# Patient Record
Sex: MALE | Race: WHITE | NOT HISPANIC OR LATINO | Employment: UNEMPLOYED
[De-identification: names, ages, dates, MRNs, and addresses within clinical notes are randomized per-mention and may not be internally consistent; named-entity substitution may affect disease eponyms.]

---

## 2017-06-29 ENCOUNTER — RECORDS - HEALTHEAST (OUTPATIENT)
Dept: ADMINISTRATIVE | Facility: OTHER | Age: 47
End: 2017-06-29

## 2017-06-29 ENCOUNTER — HOSPITAL ENCOUNTER (OUTPATIENT)
Dept: ULTRASOUND IMAGING | Facility: HOSPITAL | Age: 47
Discharge: HOME | End: 2017-06-29

## 2017-06-29 DIAGNOSIS — M79.89 LEG SWELLING: ICD-10-CM

## 2018-08-10 ENCOUNTER — RECORDS - HEALTHEAST (OUTPATIENT)
Dept: LAB | Facility: CLINIC | Age: 48
End: 2018-08-10

## 2018-08-10 LAB
C REACTIVE PROTEIN LHE: 2.9 MG/DL (ref 0–0.8)
ERYTHROCYTE [SEDIMENTATION RATE] IN BLOOD BY WESTERGREN METHOD: 3 MM/HR (ref 0–15)
RHEUMATOID FACT SERPL-ACNC: <15 IU/ML (ref 0–30)
URATE SERPL-MCNC: 7.4 MG/DL (ref 3–8)

## 2018-08-13 LAB — B BURGDOR IGG+IGM SER QL: 0.18 INDEX VALUE

## 2018-08-23 ENCOUNTER — RECORDS - HEALTHEAST (OUTPATIENT)
Dept: LAB | Facility: CLINIC | Age: 48
End: 2018-08-23

## 2018-08-23 LAB — C REACTIVE PROTEIN LHE: 0.6 MG/DL (ref 0–0.8)

## 2019-08-16 ENCOUNTER — RECORDS - HEALTHEAST (OUTPATIENT)
Dept: LAB | Facility: CLINIC | Age: 49
End: 2019-08-16

## 2019-08-16 LAB
ANION GAP SERPL CALCULATED.3IONS-SCNC: 11 MMOL/L (ref 5–18)
BUN SERPL-MCNC: 11 MG/DL (ref 8–22)
CALCIUM SERPL-MCNC: 9.7 MG/DL (ref 8.5–10.5)
CHLORIDE BLD-SCNC: 103 MMOL/L (ref 98–107)
CHOLEST SERPL-MCNC: 205 MG/DL
CO2 SERPL-SCNC: 24 MMOL/L (ref 22–31)
CREAT SERPL-MCNC: 0.92 MG/DL (ref 0.7–1.3)
FASTING STATUS PATIENT QL REPORTED: ABNORMAL
GFR SERPL CREATININE-BSD FRML MDRD: >60 ML/MIN/1.73M2
GLUCOSE BLD-MCNC: 92 MG/DL (ref 70–125)
HDLC SERPL-MCNC: 38 MG/DL
LDLC SERPL CALC-MCNC: 141 MG/DL
POTASSIUM BLD-SCNC: 3.9 MMOL/L (ref 3.5–5)
SODIUM SERPL-SCNC: 138 MMOL/L (ref 136–145)
TRIGL SERPL-MCNC: 132 MG/DL
TSH SERPL DL<=0.005 MIU/L-ACNC: 2.04 UIU/ML (ref 0.3–5)

## 2019-08-19 LAB — B BURGDOR IGG+IGM SER QL: 0.16 INDEX VALUE

## 2019-08-20 ENCOUNTER — RECORDS - HEALTHEAST (OUTPATIENT)
Dept: ADMINISTRATIVE | Facility: OTHER | Age: 49
End: 2019-08-20

## 2019-08-20 ENCOUNTER — AMBULATORY - HEALTHEAST (OUTPATIENT)
Dept: CARDIOLOGY | Facility: CLINIC | Age: 49
End: 2019-08-20

## 2019-08-23 ENCOUNTER — OFFICE VISIT - HEALTHEAST (OUTPATIENT)
Dept: CARDIOLOGY | Facility: CLINIC | Age: 49
End: 2019-08-23

## 2019-08-23 DIAGNOSIS — R07.2 PRECORDIAL PAIN: ICD-10-CM

## 2019-08-23 ASSESSMENT — MIFFLIN-ST. JEOR: SCORE: 1832.89

## 2019-08-28 ENCOUNTER — HOSPITAL ENCOUNTER (OUTPATIENT)
Dept: CARDIOLOGY | Facility: HOSPITAL | Age: 49
Discharge: HOME | End: 2019-08-28
Attending: INTERNAL MEDICINE

## 2019-08-28 DIAGNOSIS — R07.2 PRECORDIAL PAIN: ICD-10-CM

## 2019-08-28 LAB
CV STRESS CURRENT BP HE: NORMAL
CV STRESS CURRENT HR HE: 101
CV STRESS CURRENT HR HE: 103
CV STRESS CURRENT HR HE: 104
CV STRESS CURRENT HR HE: 104
CV STRESS CURRENT HR HE: 108
CV STRESS CURRENT HR HE: 117
CV STRESS CURRENT HR HE: 117
CV STRESS CURRENT HR HE: 120
CV STRESS CURRENT HR HE: 125
CV STRESS CURRENT HR HE: 133
CV STRESS CURRENT HR HE: 134
CV STRESS CURRENT HR HE: 146
CV STRESS CURRENT HR HE: 146
CV STRESS CURRENT HR HE: 155
CV STRESS CURRENT HR HE: 155
CV STRESS CURRENT HR HE: 62
CV STRESS CURRENT HR HE: 63
CV STRESS CURRENT HR HE: 84
CV STRESS CURRENT HR HE: 86
CV STRESS CURRENT HR HE: 86
CV STRESS CURRENT HR HE: 87
CV STRESS CURRENT HR HE: 88
CV STRESS CURRENT HR HE: 90
CV STRESS CURRENT HR HE: 90
CV STRESS CURRENT HR HE: 91
CV STRESS CURRENT HR HE: 91
CV STRESS CURRENT HR HE: 93
CV STRESS CURRENT HR HE: 94
CV STRESS CURRENT HR HE: 96
CV STRESS CURRENT HR HE: 98
CV STRESS CURRENT HR HE: 99
CV STRESS DEVIATION TIME HE: NORMAL
CV STRESS ECHO PERCENT HR HE: NORMAL
CV STRESS EXERCISE STAGE HE: NORMAL
CV STRESS FINAL RESTING BP HE: NORMAL
CV STRESS FINAL RESTING HR HE: 91
CV STRESS MAX HR HE: 155
CV STRESS MAX TREADMILL GRADE HE: 16
CV STRESS MAX TREADMILL SPEED HE: 4.2
CV STRESS PEAK DIA BP HE: NORMAL
CV STRESS PEAK SYS BP HE: NORMAL
CV STRESS PHASE HE: NORMAL
CV STRESS PROTOCOL HE: NORMAL
CV STRESS RESTING PT POSITION HE: NORMAL
CV STRESS RESTING PT POSITION HE: NORMAL
CV STRESS ST DEVIATION AMOUNT HE: NORMAL
CV STRESS ST DEVIATION ELEVATION HE: NORMAL
CV STRESS ST EVELATION AMOUNT HE: NORMAL
CV STRESS TEST TYPE HE: NORMAL
CV STRESS TOTAL STAGE TIME MIN 1 HE: NORMAL
ECHO EJECTION FRACTION ESTIMATED: 65 %
STRESS ECHO BASELINE BP: NORMAL
STRESS ECHO BASELINE HR: 62
STRESS ECHO CALCULATED PERCENT HR: 90 %
STRESS ECHO LAST STRESS BP: NORMAL
STRESS ECHO LAST STRESS HR: 155
STRESS ECHO POST ESTIMATED WORKLOAD: 11.7
STRESS ECHO POST EXERCISE DUR MIN: 10
STRESS ECHO POST EXERCISE DUR SEC: 8
STRESS ECHO TARGET HR: 147

## 2019-08-29 ENCOUNTER — AMBULATORY - HEALTHEAST (OUTPATIENT)
Dept: CARDIOLOGY | Facility: CLINIC | Age: 49
End: 2019-08-29

## 2019-08-29 DIAGNOSIS — R07.2 PRECORDIAL PAIN: ICD-10-CM

## 2019-08-30 ENCOUNTER — AMBULATORY - HEALTHEAST (OUTPATIENT)
Dept: CARDIOLOGY | Facility: CLINIC | Age: 49
End: 2019-08-30

## 2019-09-11 ENCOUNTER — HOSPITAL ENCOUNTER (OUTPATIENT)
Dept: CT IMAGING | Facility: CLINIC | Age: 49
Discharge: HOME | End: 2019-09-11
Attending: INTERNAL MEDICINE

## 2019-09-11 DIAGNOSIS — R07.2 PRECORDIAL PAIN: ICD-10-CM

## 2019-09-11 LAB
CV CALCIUM SCORE AGATSTON LM: 0
CV CALCIUM SCORING AGATSON LAD: 0
CV CALCIUM SCORING AGATSTON CX: 0
CV CALCIUM SCORING AGATSTON RCA: 0
CV CALCIUM SCORING AGATSTON TOTAL: 0

## 2019-09-12 ENCOUNTER — COMMUNICATION - HEALTHEAST (OUTPATIENT)
Dept: CARDIOLOGY | Facility: CLINIC | Age: 49
End: 2019-09-12

## 2019-09-13 ENCOUNTER — COMMUNICATION - HEALTHEAST (OUTPATIENT)
Dept: CARDIOLOGY | Facility: CLINIC | Age: 49
End: 2019-09-13

## 2019-12-30 ENCOUNTER — RECORDS - HEALTHEAST (OUTPATIENT)
Dept: ADMINISTRATIVE | Facility: OTHER | Age: 49
End: 2019-12-30

## 2020-01-02 ENCOUNTER — HOSPITAL ENCOUNTER (OUTPATIENT)
Dept: RADIOLOGY | Facility: HOSPITAL | Age: 50
Discharge: HOME | End: 2020-01-02

## 2020-01-02 DIAGNOSIS — K21.00 REFLUX ESOPHAGITIS: ICD-10-CM

## 2020-07-10 ENCOUNTER — RECORDS - HEALTHEAST (OUTPATIENT)
Dept: LAB | Facility: CLINIC | Age: 50
End: 2020-07-10

## 2020-07-10 LAB
ALBUMIN SERPL-MCNC: 4.3 G/DL (ref 3.5–5)
ALP SERPL-CCNC: 96 U/L (ref 45–120)
ALT SERPL W P-5'-P-CCNC: 67 U/L (ref 0–45)
AST SERPL W P-5'-P-CCNC: 43 U/L (ref 0–40)
BASOPHILS # BLD AUTO: 0.1 THOU/UL (ref 0–0.2)
BASOPHILS NFR BLD AUTO: 1 % (ref 0–2)
BILIRUB DIRECT SERPL-MCNC: 0.2 MG/DL
BILIRUB SERPL-MCNC: 0.7 MG/DL (ref 0–1)
C REACTIVE PROTEIN LHE: 0.2 MG/DL (ref 0–0.8)
EOSINOPHIL # BLD AUTO: 0.2 THOU/UL (ref 0–0.4)
EOSINOPHIL NFR BLD AUTO: 3 % (ref 0–6)
ERYTHROCYTE [DISTWIDTH] IN BLOOD BY AUTOMATED COUNT: 13.9 % (ref 11–14.5)
ERYTHROCYTE [SEDIMENTATION RATE] IN BLOOD BY WESTERGREN METHOD: 2 MM/HR (ref 0–15)
HCT VFR BLD AUTO: 45.9 % (ref 40–54)
HGB BLD-MCNC: 14.8 G/DL (ref 14–18)
LYMPHOCYTES # BLD AUTO: 2.5 THOU/UL (ref 0.8–4.4)
LYMPHOCYTES NFR BLD AUTO: 33 % (ref 20–40)
MCH RBC QN AUTO: 27.3 PG (ref 27–34)
MCHC RBC AUTO-ENTMCNC: 32.2 G/DL (ref 32–36)
MCV RBC AUTO: 85 FL (ref 80–100)
MONOCYTES # BLD AUTO: 0.5 THOU/UL (ref 0–0.9)
MONOCYTES NFR BLD AUTO: 7 % (ref 2–10)
NEUTROPHILS # BLD AUTO: 4.1 THOU/UL (ref 2–7.7)
NEUTROPHILS NFR BLD AUTO: 56 % (ref 50–70)
PLATELET # BLD AUTO: 219 THOU/UL (ref 140–440)
PMV BLD AUTO: 11 FL (ref 8.5–12.5)
PROT SERPL-MCNC: 7.1 G/DL (ref 6–8)
RBC # BLD AUTO: 5.42 MILL/UL (ref 4.4–6.2)
WBC: 7.4 THOU/UL (ref 4–11)

## 2020-07-24 ENCOUNTER — RECORDS - HEALTHEAST (OUTPATIENT)
Dept: LAB | Facility: CLINIC | Age: 50
End: 2020-07-24

## 2020-07-24 LAB
ALBUMIN SERPL-MCNC: 4.1 G/DL (ref 3.5–5)
ALP SERPL-CCNC: 96 U/L (ref 45–120)
ALT SERPL W P-5'-P-CCNC: 50 U/L (ref 0–45)
AST SERPL W P-5'-P-CCNC: 35 U/L (ref 0–40)
BILIRUB DIRECT SERPL-MCNC: 0.2 MG/DL
BILIRUB SERPL-MCNC: 0.6 MG/DL (ref 0–1)
PROT SERPL-MCNC: 6.7 G/DL (ref 6–8)

## 2020-09-25 ENCOUNTER — RECORDS - HEALTHEAST (OUTPATIENT)
Dept: LAB | Facility: CLINIC | Age: 50
End: 2020-09-25

## 2020-09-25 LAB
ALBUMIN SERPL-MCNC: 4.5 G/DL (ref 3.5–5)
ALP SERPL-CCNC: 106 U/L (ref 45–120)
ALT SERPL W P-5'-P-CCNC: 84 U/L (ref 0–45)
ANION GAP SERPL CALCULATED.3IONS-SCNC: 9 MMOL/L (ref 5–18)
AST SERPL W P-5'-P-CCNC: 58 U/L (ref 0–40)
BILIRUB SERPL-MCNC: 0.8 MG/DL (ref 0–1)
BUN SERPL-MCNC: 8 MG/DL (ref 8–22)
CALCIUM SERPL-MCNC: 9.5 MG/DL (ref 8.5–10.5)
CHLORIDE BLD-SCNC: 105 MMOL/L (ref 98–107)
CO2 SERPL-SCNC: 27 MMOL/L (ref 22–31)
CREAT SERPL-MCNC: 0.82 MG/DL (ref 0.7–1.3)
GFR SERPL CREATININE-BSD FRML MDRD: >60 ML/MIN/1.73M2
GLUCOSE BLD-MCNC: 102 MG/DL (ref 70–125)
POTASSIUM BLD-SCNC: 4.1 MMOL/L (ref 3.5–5)
PROT SERPL-MCNC: 7.3 G/DL (ref 6–8)
SODIUM SERPL-SCNC: 141 MMOL/L (ref 136–145)

## 2020-09-26 LAB — BACTERIA SPEC CULT: NO GROWTH

## 2021-05-31 NOTE — PATIENT INSTRUCTIONS - HE
Rigoberto Laird,    It was a pleasure to see you today at the [facility].     My recommendations after this visit include:    Stress echo  Try omeparazol for chante Dickinson MD, FACC, REINALDO

## 2021-06-01 NOTE — TELEPHONE ENCOUNTER
----- Message from Shelly Martinez sent at 9/13/2019 10:42 AM CDT -----  Contact: Pt  General phone call:    Caller: Pt  Primary cardiologist: Teena  Detailed reason for call: Pt called for CT results. Please call back.  Best phone number:   Best time to contact: Any  Ok to leave a detailed message? Yes  Device? No    Additional Info:

## 2021-06-01 NOTE — TELEPHONE ENCOUNTER
Returned call to Rigoberto, shared results and recommendations. Pt continues to FU with PCP. Advised him to call if he has further questions or concerns going forward. -kcl

## 2021-06-03 VITALS — HEIGHT: 69 IN | BODY MASS INDEX: 31.76 KG/M2 | WEIGHT: 214.4 LBS

## 2021-06-10 ENCOUNTER — RECORDS - HEALTHEAST (OUTPATIENT)
Dept: LAB | Facility: CLINIC | Age: 51
End: 2021-06-10

## 2021-06-11 LAB
VARICELLA ZOSTER DNA COMMENT: ABNORMAL
VZV DNA SPEC QL NAA+PROBE: ABNORMAL
VZV PCR SPECIMEN: ABNORMAL

## 2021-06-16 PROBLEM — J18.9 COMMUNITY ACQUIRED PNEUMONIA, BILATERAL: Status: ACTIVE | Noted: 2020-06-18

## 2021-06-16 PROBLEM — R07.2 PRECORDIAL PAIN: Status: ACTIVE | Noted: 2019-08-23

## 2021-06-19 NOTE — LETTER
Letter by Mari Kahn RN at      Author: Mari Kahn RN Service: -- Author Type: --    Filed:  Encounter Date: 9/12/2019 Status: (Other)         Rigoberto Laird  [address] [place] [address]             September 12, 2019         Dear Mr. Larid,    Below are the results from your recent visit:    Resulted Orders   CT Cardiac Calcium Score   Result Value Ref Range    Agatston Score of Left Main 0     Agatston Score of the Left Anterior Descending 0     Agatston Score of Circumflex 0     Agatston Score of Right Coronary Artery 0     Total Score 0.00     Narrative      The total Agatston calcium score is 0. A calcium score of zero places   the individual in the lowest quartile when compared to an age and gender   matched control group and implies a low risk of cardiac events in the next   10 years. (less than 1% per year).        From: Lc Dickinson MD (Ted)  Sent: 9/11/2019   5:22 PM  No coronary calcium, excellent prognosis, does not require cholesterol-lowering medication at this juncture of his life.  Follow-up with primary physician    Please call with questionst.    Sincerely,        Electronically signed by Mari JACQUES RN

## 2021-06-25 ENCOUNTER — TRANSFERRED RECORDS (OUTPATIENT)
Dept: HEALTH INFORMATION MANAGEMENT | Facility: CLINIC | Age: 51
End: 2021-06-25

## 2021-06-25 LAB
ALT SERPL-CCNC: 84 IU/L (ref 0–44)
AST SERPL-CCNC: 54 IU/L (ref 0–40)
CREATININE (EXTERNAL): 0.77 MG/DL (ref 0.76–1.27)
GFR ESTIMATED (EXTERNAL): 107 ML/MIN/1.73
GFR ESTIMATED (IF AFRICAN AMERICAN) (EXTERNAL): 123 ML/MIN/1.73

## 2021-06-27 NOTE — PROGRESS NOTES
Progress Notes by Lc Dickinson MD (Ted) at 8/23/2019 10:20 AM     Author: Lc Dickinson MD (Ted) Service: -- Author Type: Physician    Filed: 8/23/2019 10:46 AM Encounter Date: 8/23/2019 Status: Signed    : Lc Dickinson MD (Ted) (Physician)           Click to link to [facility] NOTE    Thank you, Dr. Varela, for asking the [facility] to evaluate Mr. Rigoberto Laird.      Assessment/Recommendations   Assessment:    Chest discomfort, uncertain etiology, atypical features  GERD  Tobacco use  Hypercholesterolemia, mild, untreated    Plan:  Stress echo  If negative we may consider coronary calcium scan to determine need for cholesterol treatment.    We discussed healthy lifestyle choices including regular exercise tobacco cessation and proper diet    Chest pain could be related to worsening heartburn and possible esophageal spasm.  He should try over-the-counter omeprazole.       History of Present Illness    Mr. Rigoberto Laird is a 49 y.o. male who comes into rapid access clinic for follow-up from the emergency room.  On 8/19/2019 he developed anterior chest discomfort and chest tightness.  The discomfort came in after he completed a walk.  There was no pleuritic features of the chest discomfort.  The chest discomfort was somewhat better when he was leaning forward.  He had no fever chills or sputum production.  He denies any tenderness of the chest wall.  He came to the emergency room.  ER evaluation was negative including normal EKG,troponins at the bedside chest ultrasound.  He states that he has had similar but less intense chest feeling on and off for last several days.  He denies any exertional features of the chest discomfort.  He is not known to have coronary artery disease.  He has never had ischemic evaluation.  He reports that he has had heartburn on and off for several years.  In recent weeks heartburn become more  bothersome.  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  He did not require any intervention.  In recent weeks he had 2 short episode of heart palpitations.  The palpitation occurred at rest and lasted less than 5 minutes.  He denies syncope or near syncope.    ECG: Personally reviewed.  Normal sinus rhythm within normal limits    Chest x-ray: Negative     Physical Examination Review of Systems   Vitals:    08/23/19 1008   BP: 110/80   Pulse: 64   Resp: 16     Body mass index is 31.66 kg/m .  Wt Readings from Last 3 Encounters:   08/23/19 214 lb 6.4 oz (97.3 kg)   08/19/19 215 lb (97.5 kg)     General Appearance:   Alert, cooperative, no distress, appears stated age   Head/ENT: Normocephalic, without obvious abnormality. Membranes moist      EYES:  no scleral icterus, normal conjunctivae   Neck: Supple, symmetrical, trachea midline, no adenopathy, thyroid: not enlarged, symmetric, no carotid bruit or JVD   Chest/Lungs:   Lungs are clear to auscultation, respirations unlabored. No tenderness or deformity    Cardiovascular:   Regular rhythm, S1, S2 normal, no murmur, rub or gallop.   Abdomen:  Soft, non-tender, bowel sounds active all four quadrants,  no masses, no organomegaly   Extremities: no cyanosis or clubbing. No edema   Skin: Skin color, texture, turgor normal, no rashes or lesions.    Psychiatric: Normal affect, calm   Neurologic: Alert and oriented x 3, moving all four extremities.     General: WNL  Eyes: WNL  Ears/Nose/Throat: WNL  Lungs: WNL  Heart: Chest Pain  Stomach: Constipation, Heartburn  Bladder: WNL  Muscle/Joints: WNL  Skin: WNL  Nervous System: WNL  Mental Health: WNL     Blood: WNL     Medical History  Surgical History Family History Social History   GERD None  his brother had stents in his 50s.  Brother is diabetic. Social History     Socioeconomic History   ? Marital status:      Spouse name: Not on file   ? Number of children: Not on file   ? Years of education: Not on file    ? Highest education level: Not on file   Occupational History   ? Not on file   Social Needs   ? Financial resource strain: Not on file   ? Food insecurity:     Worry: Not on file     Inability: Not on file   ? Transportation needs:     Medical: Not on file     Non-medical: Not on file   Tobacco Use   ? Smoking status: Current Some Day Smoker     Types: Cigarettes     Start date: 2004   ? Smokeless tobacco: Never Used   Substance and Sexual Activity   ? Alcohol use: Not on file   ? Drug use: Not on file   ? Sexual activity: Not on file   Lifestyle   ? Physical activity:     Days per week: Not on file     Minutes per session: Not on file   ? Stress: Not on file   Relationships   ? Social connections:     Talks on phone: Not on file     Gets together: Not on file     Attends Hinduism service: Not on file     Active member of club or organization: Not on file     Attends meetings of clubs or organizations: Not on file     Relationship status: Not on file   ? Intimate partner violence:     Fear of current or ex partner: Not on file     Emotionally abused: Not on file     Physically abused: Not on file     Forced sexual activity: Not on file   Other Topics Concern   ? Not on file   Social History Narrative   ? Not on file          Medications  Allergies   No current outpatient medications on file.     No current facility-administered medications for this visit.       No Known Allergies      Lab Results    Chemistry/lipid CBC Cardiac Enzymes/BNP/TSH/INR   Lab Results   Component Value Date    CHOL 205 (H) 08/16/2019    HDL 38 (L) 08/16/2019    LDLCALC 141 (H) 08/16/2019    TRIG 132 08/16/2019    CREATININE 1.00 08/19/2019    BUN 12 08/19/2019    K 3.7 08/19/2019     08/19/2019     08/19/2019    CO2 27 08/19/2019    Lab Results   Component Value Date    WBC 6.4 08/19/2019    HGB 15.3 08/19/2019    HCT 45.6 08/19/2019    MCV 86 08/19/2019     08/19/2019    Lab Results   Component Value Date     TROPONINI <0.01 08/19/2019    TSH 2.04 08/16/2019

## 2021-07-01 ENCOUNTER — TRANSFERRED RECORDS (OUTPATIENT)
Dept: HEALTH INFORMATION MANAGEMENT | Facility: CLINIC | Age: 51
End: 2021-07-01

## 2021-07-03 NOTE — ADDENDUM NOTE
Addendum Note by Herminio Kahn RN at 8/29/2019 10:25 AM     Author: Herminio Kahn RN Service: -- Author Type: Registered Nurse    Filed: 8/30/2019  3:38 PM Encounter Date: 8/29/2019 Status: Signed    : Herminio Kahn RN (Registered Nurse)    Addended by: HERMINIO KAHN on: 8/30/2019 03:38 PM        Modules accepted: Orders

## 2021-07-06 ENCOUNTER — RECORDS - HEALTHEAST (OUTPATIENT)
Dept: LAB | Facility: CLINIC | Age: 51
End: 2021-07-06

## 2021-07-07 ENCOUNTER — TRANSFERRED RECORDS (OUTPATIENT)
Dept: HEALTH INFORMATION MANAGEMENT | Facility: CLINIC | Age: 51
End: 2021-07-07

## 2021-07-07 LAB — O+P STL MICRO: NORMAL

## 2021-07-13 ENCOUNTER — LAB REQUISITION (OUTPATIENT)
Dept: LAB | Facility: CLINIC | Age: 51
End: 2021-07-13

## 2021-07-13 DIAGNOSIS — R19.7 DIARRHEA, UNSPECIFIED: ICD-10-CM

## 2021-07-13 PROCEDURE — 87493 C DIFF AMPLIFIED PROBE: CPT | Performed by: PHYSICIAN ASSISTANT

## 2021-07-14 LAB — C DIFF TOX B STL QL: NEGATIVE

## 2021-08-22 ENCOUNTER — HEALTH MAINTENANCE LETTER (OUTPATIENT)
Age: 51
End: 2021-08-22

## 2021-08-23 ENCOUNTER — TELEPHONE (OUTPATIENT)
Dept: RHEUMATOLOGY | Facility: CLINIC | Age: 51
End: 2021-08-23

## 2021-08-23 ENCOUNTER — VIRTUAL VISIT (OUTPATIENT)
Dept: RHEUMATOLOGY | Facility: CLINIC | Age: 51
End: 2021-08-23
Attending: PHYSICIAN ASSISTANT
Payer: COMMERCIAL

## 2021-08-23 DIAGNOSIS — M06.4 INFLAMMATORY POLYARTHRITIS (H): ICD-10-CM

## 2021-08-23 DIAGNOSIS — M25.50 POLYARTHRALGIA: Primary | ICD-10-CM

## 2021-08-23 DIAGNOSIS — M25.50 MULTIPLE JOINT PAIN: ICD-10-CM

## 2021-08-23 PROCEDURE — 99204 OFFICE O/P NEW MOD 45 MIN: CPT | Mod: 95 | Performed by: INTERNAL MEDICINE

## 2021-08-23 RX ORDER — LEFLUNOMIDE 20 MG/1
1 TABLET ORAL DAILY
COMMUNITY
End: 2021-09-23

## 2021-08-23 RX ORDER — OMEGA-3/DHA/EPA/FISH OIL 60 MG-90MG
1 CAPSULE ORAL DAILY
COMMUNITY

## 2021-08-23 NOTE — TELEPHONE ENCOUNTER
Left voicemail for patient to call back.     Pt need to do lab and follow up with Dr. Butt (in-person) after result come back.     Please schedule appointment. Thanks.

## 2021-08-23 NOTE — PROGRESS NOTES
Rigoberto is a 51 year old who is being evaluated via a billable video visit.      How would you like to obtain your AVS? [employer]  If the video visit is dropped, the invitation should be resent by: [phone]  Will anyone else be joining your video visit? No               Video-Visit Details    Type of service:  Video Visit  Start: 08/23/2021 04:46 pm   Video End Time:5:05 PM    Originating Location (pt. Location): Home    Distant Location (provider location):  [facility]     Platform used for Video Visit: Both [employer]     This document was created using a software with less than 100% fidelity, at times resulting in unintended, even erroneous syntax and grammar.  The reader is advised to keep this under consideration while reviewing, interpreting this note.    ASSESSMENT AND PLAN:  Rigoberto Laird 51 year old male is seen here on 08/23/21 for evaluation of is a several features consistent with inflammatory joint disease has had a slew of infection since on Humira he would like to stop it permanently, he is already not taken it for the past 4 weeks.  We discussed various nuances associated with management of inflammatory joint disease.  Check labs as noted.  He will stay off Humira for now.  We will meet here in the next few weeks.  Leflunomide 20 as now on alternate days.  Diagnoses and all orders for this visit:  Polyarthralgia  -     Hepatitis C antibody; Future  -     Anti Nuclear Susanne IgG by IFA with Reflex; Future  -     Rheumatoid factor; Future  -     Cyclic Citrullinated Peptide Antibody IgG; Future  -     CRP inflammation; Future  -     Creatinine; Future  -     CBC with Platelets & Differential; Future  -     ALT; Future  -     Albumin level; Future  -     Uric acid; Future  Multiple joint pain  -     Rheumatology Referral  Inflammatory polyarthritis (H)  -     Hepatitis C antibody; Future  -     Anti Nuclear Susanne IgG by IFA with Reflex; Future  -     Rheumatoid factor;  "Future  -     Cyclic Citrullinated Peptide Antibody IgG; Future  -     CRP inflammation; Future  -     Creatinine; Future  -     CBC with Platelets & Differential; Future  -     ALT; Future  -     Albumin level; Future  -     Uric acid; Future    HISTORY OF PRESENTING ILLNESS:  Rigoberto Laird, 51 year old, male is here for evaluation of polyarthralgias, diagnosed as inflammatory joint disease.  He is accompanied by his wife.  He reports that his symptoms began nearly 10 years ago.  This was in his feet.  He would be told that this is plantar fasciitis.  He will be given prednisone off and on when he would \"flareup\".  He would be given a boot.  5 years ago he started hurting even more so.  His wife is concerned that this represents more than plantar fasciitis.  Gradually other joints including knees, wrists and shoulders also started hurting.  2 years ago he started noticing pain and some swelling in the wrists.  About 18 months or so ago he \"blew up\" everywhere with swollen knees swollen ankles and wrists.  He was finally seen in rheumatology he was given intravenous infusions of steroids he recalls for several days and finally his pain came under control.  He has since tried methotrexate, he does not recall all the details but wonders if it was for 2 or 3 weeks, and he was then told that he should go on leflunomide which was associated with abnormal liver function studies and now he takes it every other day, he was started on Humira a year ago.  With this commendation he had an excellent control of his joint symptoms.  However since being on Humira he has had multiple episodes of various infectious conditions.  He has had multiple bladder infections shingles pleurisy and a gastric infection, apparently rotavirus.  He feels that he rather not take Humira indeed he has not taken it for the past 1 month.  There has been no flareup of his joint symptoms.  There is no personal family history of psoriasis ulcerative " colitis or Crohn's disease.  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         ALLERGIES:Patient has no known allergies.    PAST MEDICAL/ACTIVE PROBLEMS/MEDICATION/ FAMILY HISTORY/SOCIAL DATA:  The patient has a family history of  No past medical history on file.  History   Smoking Status     Current Some Day Smoker     Types: Cigarettes, Cigarettes     Start date: 1/1/2004   Smokeless Tobacco     Never Used     Patient Active Problem List   Diagnosis     Precordial pain     Community acquired pneumonia, bilateral     Current Outpatient Medications   Medication Sig Dispense Refill     Cholecalciferol (VITAMIN D3 PO) Take 100 mcg by mouth daily       MEDICATION CANNOT BE REORDERED - PLEASE MANUALLY REORDER AND DISCONTINUE THE OLD ORDER Inject 40 mg Subcutaneous every 14 days Humira       omeprazole (PRILOSEC) 40 MG capsule [OMEPRAZOLE (PRILOSEC) 40 MG CAPSULE] Take 40 mg by mouth 2 (two) times a day before meals.       traZODone (DESYREL) 50 MG tablet [TRAZODONE (DESYREL) 50 MG TABLET] Take 100 mg by mouth at bedtime as needed for sleep.       UNABLE TO FIND MEDICATION NAME: turmuric- take 1 tab every day       docusate sodium (COLACE) 100 MG capsule [DOCUSATE SODIUM (COLACE) 100 MG CAPSULE] Take 1 capsule (100 mg total) by mouth daily as needed for constipation. (Patient not taking: Reported on 8/23/2021)  0     ergocalciferol (VITAMIN D2) 1,250 mcg (50,000 unit) capsule [ERGOCALCIFEROL (VITAMIN D2) 1,250 MCG (50,000 UNIT) CAPSULE] Take 50,000 Units by mouth every 7 days. (Patient not taking: Reported on 8/23/2021)       fish oil-omega-3 fatty acids 500 MG capsule Take 1 capsule by mouth daily       folic acid (FOLVITE) 1 MG tablet [FOLIC ACID (FOLVITE) 1 MG TABLET] Take 1 mg by mouth daily. (Patient not taking: Reported on 8/23/2021)       leflunomide (ARAVA) 20 MG tablet Take 1 tablet by mouth daily       Multiple Vitamin (MULTI VITAMIN) TABS Take 1 tablet by mouth daily          COMPREHENSIVE EXAMINATION:  There were no vitals filed for this visit.  A well appearing alert oriented male. Well appearing alert oriented.   Examination of the eyes revealed no redness, obvious scleromalacia.  ENT examination shows no nasal deformity such as of saddle type, external ear without signs of inflamm/deformity ation.  Cardiopulmonary examination without obvious signs of dyspnea, no wheezing is audible, no cyanosis.  There is no finger clubbing.  Skin examination performed for heliotrope, malar area eruption periungual erythema, lupus pernio.  Neurological examination shows the speech is fluent, no facial asymmetry, muscle power in the upper extremities proximally appears to be normal.  In the psychiatric examination the memory, orientation, attention, affect were observable and normal.  Joint examination of the DIPs, PIPs, MCPs, IP joints of the thumbs, wrists, elbows, for swelling, range of motion, for shoulders range of motion evaluated.  No swelling of the digits he can flex his fingers into a fist bilaterally wrist elbow shoulder range of motion is full.    LAB / IMAGING DATA:  ALT   Date Value Ref Range Status   11/02/2020 69 (H) 0 - 45 U/L Final   09/25/2020 84 (H) 0 - 45 U/L Final   07/24/2020 50 (H) 0 - 45 U/L Final     Albumin   Date Value Ref Range Status   11/02/2020 4.3 3.5 - 5.0 g/dL Final   09/25/2020 4.5 3.5 - 5.0 g/dL Final   07/24/2020 4.1 3.5 - 5.0 g/dL Final       WBC   Date Value Ref Range Status   11/02/2020 7.2 4.0 - 11.0 thou/uL Final   07/10/2020 7.4 4.0 - 11.0 thou/uL Final     Hemoglobin   Date Value Ref Range Status   11/02/2020 15.3 14.0 - 18.0 g/dL Final   07/10/2020 14.8 14.0 - 18.0 g/dL Final   06/19/2020 13.4 (L) 14.0 - 18.0 g/dL Final     Platelet Count   Date Value Ref Range Status   11/02/2020 237 140 - 440 thou/uL Final   07/10/2020 219 140 - 440 thou/uL Final   06/19/2020 229 140 - 440 thou/uL Final       No results found for: SANTOS

## 2021-09-09 ENCOUNTER — LAB (OUTPATIENT)
Dept: LAB | Facility: CLINIC | Age: 51
End: 2021-09-09
Payer: COMMERCIAL

## 2021-09-09 DIAGNOSIS — M25.50 POLYARTHRALGIA: ICD-10-CM

## 2021-09-09 DIAGNOSIS — M06.4 INFLAMMATORY POLYARTHRITIS (H): ICD-10-CM

## 2021-09-09 LAB
ALBUMIN SERPL-MCNC: 3.9 G/DL (ref 3.5–5)
ALT SERPL W P-5'-P-CCNC: 44 U/L (ref 0–45)
BASOPHILS # BLD AUTO: 0.1 10E3/UL (ref 0–0.2)
BASOPHILS NFR BLD AUTO: 1 %
C REACTIVE PROTEIN LHE: 0.1 MG/DL (ref 0–0.8)
CCP AB SER IA-ACNC: <0.5 U/ML
CREAT SERPL-MCNC: 0.79 MG/DL (ref 0.7–1.3)
EOSINOPHIL # BLD AUTO: 0.3 10E3/UL (ref 0–0.7)
EOSINOPHIL NFR BLD AUTO: 4 %
ERYTHROCYTE [DISTWIDTH] IN BLOOD BY AUTOMATED COUNT: 13.5 % (ref 10–15)
GFR SERPL CREATININE-BSD FRML MDRD: >90 ML/MIN/1.73M2
HCT VFR BLD AUTO: 47 % (ref 40–53)
HGB BLD-MCNC: 15.2 G/DL (ref 13.3–17.7)
IMM GRANULOCYTES # BLD: 0 10E3/UL
IMM GRANULOCYTES NFR BLD: 0 %
LYMPHOCYTES # BLD AUTO: 2 10E3/UL (ref 0.8–5.3)
LYMPHOCYTES NFR BLD AUTO: 29 %
MCH RBC QN AUTO: 28 PG (ref 26.5–33)
MCHC RBC AUTO-ENTMCNC: 32.3 G/DL (ref 31.5–36.5)
MCV RBC AUTO: 87 FL (ref 78–100)
MONOCYTES # BLD AUTO: 0.7 10E3/UL (ref 0–1.3)
MONOCYTES NFR BLD AUTO: 9 %
NEUTROPHILS # BLD AUTO: 4.1 10E3/UL (ref 1.6–8.3)
NEUTROPHILS NFR BLD AUTO: 58 %
PLATELET # BLD AUTO: 219 10E3/UL (ref 150–450)
RBC # BLD AUTO: 5.43 10E6/UL (ref 4.4–5.9)
RHEUMATOID FACT SER NEPH-ACNC: <15 IU/ML (ref 0–30)
URATE SERPL-MCNC: 6.5 MG/DL (ref 3–8)
WBC # BLD AUTO: 7.1 10E3/UL (ref 4–11)

## 2021-09-09 PROCEDURE — 86803 HEPATITIS C AB TEST: CPT

## 2021-09-09 PROCEDURE — 84550 ASSAY OF BLOOD/URIC ACID: CPT

## 2021-09-09 PROCEDURE — 86141 C-REACTIVE PROTEIN HS: CPT

## 2021-09-09 PROCEDURE — 86200 CCP ANTIBODY: CPT

## 2021-09-09 PROCEDURE — 86038 ANTINUCLEAR ANTIBODIES: CPT

## 2021-09-09 PROCEDURE — 85025 COMPLETE CBC W/AUTO DIFF WBC: CPT

## 2021-09-09 PROCEDURE — 82565 ASSAY OF CREATININE: CPT

## 2021-09-09 PROCEDURE — 84460 ALANINE AMINO (ALT) (SGPT): CPT

## 2021-09-09 PROCEDURE — 36415 COLL VENOUS BLD VENIPUNCTURE: CPT

## 2021-09-09 PROCEDURE — 86431 RHEUMATOID FACTOR QUANT: CPT

## 2021-09-09 PROCEDURE — 82040 ASSAY OF SERUM ALBUMIN: CPT

## 2021-09-10 LAB — HCV AB SERPL QL IA: NEGATIVE

## 2021-09-14 LAB — ANA SER QL IF: NEGATIVE

## 2021-09-23 ENCOUNTER — OFFICE VISIT (OUTPATIENT)
Dept: RHEUMATOLOGY | Facility: CLINIC | Age: 51
End: 2021-09-23
Payer: COMMERCIAL

## 2021-09-23 VITALS
WEIGHT: 200.4 LBS | BODY MASS INDEX: 29.59 KG/M2 | SYSTOLIC BLOOD PRESSURE: 120 MMHG | DIASTOLIC BLOOD PRESSURE: 70 MMHG | HEART RATE: 64 BPM

## 2021-09-23 DIAGNOSIS — M25.50 POLYARTHRALGIA: ICD-10-CM

## 2021-09-23 DIAGNOSIS — Z79.899 HIGH RISK MEDICATION USE: ICD-10-CM

## 2021-09-23 DIAGNOSIS — M06.4 INFLAMMATORY POLYARTHRITIS (H): Primary | ICD-10-CM

## 2021-09-23 PROCEDURE — 99214 OFFICE O/P EST MOD 30 MIN: CPT | Performed by: INTERNAL MEDICINE

## 2021-09-23 RX ORDER — LEFLUNOMIDE 20 MG/1
20 TABLET ORAL EVERY OTHER DAY
Qty: 45 TABLET | Refills: 0 | Status: SHIPPED | OUTPATIENT
Start: 2021-09-23 | End: 2021-12-21

## 2021-09-23 RX ORDER — PREDNISONE 10 MG/1
10 TABLET ORAL DAILY
Qty: 30 TABLET | Refills: 0 | Status: SHIPPED | OUTPATIENT
Start: 2021-09-23 | End: 2021-10-23

## 2021-09-23 NOTE — PROGRESS NOTES
"      Rheumatology follow-up visit note     Rigoberto is a 51 year old male presents today for follow-up.    Rigoberto was seen today for recheck.    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  Today we had a detailed discussion reviewing various scenarios.  For now he is going to stay on this course of leflunomide will have prednisone at hand in case he flares up.  Will meet here in 3 months with labs every 6 weeks.     HPI    Rigoberto Laird is a 51 year old male is here for follow-up of polyarthralgias, diagnosed as inflammatory joint disease.  He is accompanied by his wife.  He reports that his symptoms began nearly 10 years ago.  This was in his feet.  He would be told that this is plantar fasciitis.  He will be given prednisone off and on when he would \"flareup\".  He would be given a boot.  5 years ago he started hurting even more so.  His wife is concerned that this represents more than plantar fasciitis.  Gradually other joints including knees, wrists and shoulders also started hurting.  2 years ago he started noticing pain and some swelling in the wrists.  About 18 months or so ago he \"blew up\" everywhere with " swollen knees swollen ankles and wrists.  He was finally seen in rheumatology he was given intravenous infusions of steroids he recalls for several days and finally his pain came under control.  He has since tried methotrexate, he does not recall all the details but wonders if it was for 2 or 3 weeks, and he was then told that he should go on leflunomide which was associated with abnormal liver function studies and now he takes it every other day, he was started on Humira a year ago.  With this commendation he had an excellent control of his joint symptoms.  However since being on Humira he has had multiple episodes of various infectious conditions.  He has had multiple bladder infections shingles pleurisy and a gastric infection, apparently rotavirus.  He feels that he rather not take Humira indeed he has not taken it for the past 1 month.  There has been no flareup of his joint symptoms.  There is no personal family history of psoriasis ulcerative colitis or Crohn's disease.  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            ALLERGIES:Patient has no known allergies.      DETAILED EXAMINATION  09/23/21  :    Vitals:    09/23/21 1526   BP: 120/70   Pulse: 64   Weight: 90.9 kg (200 lb 6.4 oz)     Alert oriented. Head including the face is examined for malar rash, heliotropes, scarring, lupus pernio. Eyes examined for redness such as in episcleritis/scleritis, periorbital lesions.   Neck/ Face examined for parotid gland swelling, range of motion of neck.  Left upper and lower and right upper and lower extremities examined for tenderness, swelling, warmth of the appendicular joints, range of motion, edema, rash.  Some of the important findings included: he does not have evidence of synovitis in the palpable joints of the upper extremities.  No significant deformities of the digits.  no Heberden nodes.  Range of motion of the shoulders  show full abduction.  No JLT effusion or warmth of the  knees.  he does not have dactylitis of the digits.     Patient Active Problem List    Diagnosis Date Noted     Community acquired pneumonia, bilateral 06/18/2020     Priority: Medium     Precordial pain 08/23/2019     Priority: Medium     No past surgical history on file.   No past medical history on file.  No Known Allergies  Current Outpatient Medications   Medication Sig Dispense Refill     Cholecalciferol (VITAMIN D3 PO) Take 100 mcg by mouth daily       docusate sodium (COLACE) 100 MG capsule [DOCUSATE SODIUM (COLACE) 100 MG CAPSULE] Take 1 capsule (100 mg total) by mouth daily as needed for constipation. (Patient not taking: Reported on 8/23/2021)  0     ergocalciferol (VITAMIN D2) 1,250 mcg (50,000 unit) capsule [ERGOCALCIFEROL (VITAMIN D2) 1,250 MCG (50,000 UNIT) CAPSULE] Take 50,000 Units by mouth every 7 days. (Patient not taking: Reported on 8/23/2021)       fish oil-omega-3 fatty acids 500 MG capsule Take 1 capsule by mouth daily       folic acid (FOLVITE) 1 MG tablet [FOLIC ACID (FOLVITE) 1 MG TABLET] Take 1 mg by mouth daily. (Patient not taking: Reported on 8/23/2021)       leflunomide (ARAVA) 20 MG tablet Take 1 tablet by mouth daily       MEDICATION CANNOT BE REORDERED - PLEASE MANUALLY REORDER AND DISCONTINUE THE OLD ORDER Inject 40 mg Subcutaneous every 14 days Humira       Multiple Vitamin (MULTI VITAMIN) TABS Take 1 tablet by mouth daily       omeprazole (PRILOSEC) 40 MG capsule [OMEPRAZOLE (PRILOSEC) 40 MG CAPSULE] Take 40 mg by mouth 2 (two) times a day before meals.       traZODone (DESYREL) 50 MG tablet [TRAZODONE (DESYREL) 50 MG TABLET] Take 100 mg by mouth at bedtime as needed for sleep.       UNABLE TO FIND MEDICATION NAME: turmuric- take 1 tab every day       family history is not on file.     Social Connections:      Frequency of Communication with Friends and Family:      Frequency of Social Gatherings with Friends and Family:      Attends Faith Services:      Active Member of  Clubs or Organizations:      Attends Club or Organization Meetings:      Marital Status:           WBC Count   Date Value Ref Range Status   09/09/2021 7.1 4.0 - 11.0 10e3/uL Final     RBC Count   Date Value Ref Range Status   09/09/2021 5.43 4.40 - 5.90 10e6/uL Final     Hemoglobin   Date Value Ref Range Status   09/09/2021 15.2 13.3 - 17.7 g/dL Final     Hematocrit   Date Value Ref Range Status   09/09/2021 47.0 40.0 - 53.0 % Final     MCV   Date Value Ref Range Status   09/09/2021 87 78 - 100 fL Final     MCH   Date Value Ref Range Status   09/09/2021 28.0 26.5 - 33.0 pg Final     Platelet Count   Date Value Ref Range Status   09/09/2021 219 150 - 450 10e3/uL Final     % Lymphocytes   Date Value Ref Range Status   09/09/2021 29 % Final     AST   Date Value Ref Range Status   11/02/2020 47 (H) 0 - 40 U/L Final     ALT   Date Value Ref Range Status   09/09/2021 44 0 - 45 U/L Final     Albumin   Date Value Ref Range Status   09/09/2021 3.9 3.5 - 5.0 g/dL Final     Alkaline Phosphatase   Date Value Ref Range Status   11/02/2020 107 45 - 120 U/L Final     Creatinine   Date Value Ref Range Status   09/09/2021 0.79 0.70 - 1.30 mg/dL Final     GFR Estimate   Date Value Ref Range Status   09/09/2021 >90 >60 mL/min/1.73m2 Final     Comment:     As of July 11, 2021, eGFR is calculated by the CKD-EPI creatinine equation, without race adjustment. eGFR can be influenced by muscle mass, exercise, and diet. The reported eGFR is an estimation only and is only applicable if the renal function is stable.   11/02/2020 >60 >60 mL/min/1.73m2 Final     GFR Estimate If Black   Date Value Ref Range Status   11/02/2020 >60 >60 mL/min/1.73m2 Final     Erythrocyte Sedimentation Rate   Date Value Ref Range Status   07/10/2020 2 0 - 15 mm/hr Final     CRP   Date Value Ref Range Status   09/09/2021 0.1 0.0-<0.8 mg/dL Final

## 2021-10-17 ENCOUNTER — HEALTH MAINTENANCE LETTER (OUTPATIENT)
Age: 51
End: 2021-10-17

## 2021-10-27 DIAGNOSIS — M25.50 POLYARTHRALGIA: Primary | ICD-10-CM

## 2021-11-04 ENCOUNTER — LAB (OUTPATIENT)
Dept: LAB | Facility: CLINIC | Age: 51
End: 2021-11-04
Payer: COMMERCIAL

## 2021-11-04 DIAGNOSIS — M25.50 POLYARTHRALGIA: ICD-10-CM

## 2021-11-04 LAB
ALBUMIN SERPL-MCNC: 4 G/DL (ref 3.5–5)
ALT SERPL W P-5'-P-CCNC: 45 U/L (ref 0–45)
CREAT SERPL-MCNC: 0.85 MG/DL (ref 0.7–1.3)
ERYTHROCYTE [DISTWIDTH] IN BLOOD BY AUTOMATED COUNT: 13.1 % (ref 10–15)
GFR SERPL CREATININE-BSD FRML MDRD: >90 ML/MIN/1.73M2
HCT VFR BLD AUTO: 47.9 % (ref 40–53)
HGB BLD-MCNC: 15.8 G/DL (ref 13.3–17.7)
MCH RBC QN AUTO: 28.3 PG (ref 26.5–33)
MCHC RBC AUTO-ENTMCNC: 33 G/DL (ref 31.5–36.5)
MCV RBC AUTO: 86 FL (ref 78–100)
PLATELET # BLD AUTO: 218 10E3/UL (ref 150–450)
RBC # BLD AUTO: 5.59 10E6/UL (ref 4.4–5.9)
WBC # BLD AUTO: 7.9 10E3/UL (ref 4–11)

## 2021-11-04 PROCEDURE — 85027 COMPLETE CBC AUTOMATED: CPT

## 2021-11-04 PROCEDURE — 36415 COLL VENOUS BLD VENIPUNCTURE: CPT

## 2021-11-04 PROCEDURE — 84460 ALANINE AMINO (ALT) (SGPT): CPT

## 2021-11-04 PROCEDURE — 82565 ASSAY OF CREATININE: CPT

## 2021-11-04 PROCEDURE — 82040 ASSAY OF SERUM ALBUMIN: CPT

## 2021-12-13 ENCOUNTER — LAB (OUTPATIENT)
Dept: LAB | Facility: CLINIC | Age: 51
End: 2021-12-13
Payer: COMMERCIAL

## 2021-12-13 DIAGNOSIS — M25.50 POLYARTHRALGIA: ICD-10-CM

## 2021-12-13 LAB
ALBUMIN SERPL-MCNC: 3.8 G/DL (ref 3.5–5)
ALT SERPL W P-5'-P-CCNC: 47 U/L (ref 0–45)
CREAT SERPL-MCNC: 0.98 MG/DL (ref 0.7–1.3)
ERYTHROCYTE [DISTWIDTH] IN BLOOD BY AUTOMATED COUNT: 13.6 % (ref 10–15)
GFR SERPL CREATININE-BSD FRML MDRD: 90 ML/MIN/1.73M2
HCT VFR BLD AUTO: 45.4 % (ref 40–53)
HGB BLD-MCNC: 14.9 G/DL (ref 13.3–17.7)
MCH RBC QN AUTO: 28.3 PG (ref 26.5–33)
MCHC RBC AUTO-ENTMCNC: 32.8 G/DL (ref 31.5–36.5)
MCV RBC AUTO: 86 FL (ref 78–100)
PLATELET # BLD AUTO: 193 10E3/UL (ref 150–450)
RBC # BLD AUTO: 5.27 10E6/UL (ref 4.4–5.9)
WBC # BLD AUTO: 6.9 10E3/UL (ref 4–11)

## 2021-12-13 PROCEDURE — 82040 ASSAY OF SERUM ALBUMIN: CPT

## 2021-12-13 PROCEDURE — 84460 ALANINE AMINO (ALT) (SGPT): CPT

## 2021-12-13 PROCEDURE — 36415 COLL VENOUS BLD VENIPUNCTURE: CPT

## 2021-12-13 PROCEDURE — 82565 ASSAY OF CREATININE: CPT

## 2021-12-13 PROCEDURE — 85027 COMPLETE CBC AUTOMATED: CPT

## 2021-12-21 ENCOUNTER — VIRTUAL VISIT (OUTPATIENT)
Dept: RHEUMATOLOGY | Facility: CLINIC | Age: 51
End: 2021-12-21
Payer: COMMERCIAL

## 2021-12-21 DIAGNOSIS — Z79.899 HIGH RISK MEDICATION USE: ICD-10-CM

## 2021-12-21 DIAGNOSIS — M06.4 INFLAMMATORY POLYARTHRITIS (H): Primary | ICD-10-CM

## 2021-12-21 DIAGNOSIS — M25.50 POLYARTHRALGIA: ICD-10-CM

## 2021-12-21 PROCEDURE — 99214 OFFICE O/P EST MOD 30 MIN: CPT | Mod: 95 | Performed by: INTERNAL MEDICINE

## 2021-12-21 RX ORDER — LEFLUNOMIDE 20 MG/1
20 TABLET ORAL EVERY OTHER DAY
Qty: 45 TABLET | Refills: 0 | Status: SHIPPED | OUTPATIENT
Start: 2021-12-21 | End: 2022-05-02

## 2021-12-21 NOTE — PROGRESS NOTES
Rigoberto is a 51 year old who is being evaluated via a billable video visit.      How would you like to obtain your AVS? MyChart  If the video visit is dropped, the invitation should be resent by: Text to cell phone: [phone]  Will anyone else be joining your video visit? No      Video Start Time: 9:44 AM  Video-Visit Details    Type of service:  Video Visit    Video End Time:9:50 AM    Originating Location (pt. Location): Home    Distant Location (provider location):  [facility]     Platform used for Video Visit: [employer]      This document was created using a software with less than 100% fidelity, at times resulting in unintended, even erroneous syntax and grammar.  The reader is advised to keep this under consideration while reviewing, interpreting this note.           ASSESSMENT AND PLAN:    Diagnoses and all orders for this visit:  Inflammatory polyarthritis (H)  -     leflunomide (ARAVA) 20 MG tablet; Take 1 tablet (20 mg) by mouth every other day  Polyarthralgia  -     leflunomide (ARAVA) 20 MG tablet; Take 1 tablet (20 mg) by mouth every other day  High risk medication use      Follow up in 3 months      HISTORY OF PRESENTING ILLNESS:  Rigoberto Laird 51 year old is evaluated here via video/audio linfollow-up of  inflammatory polyarthritis which is in keeping with a seronegative rheumatoid arthritis morphology, in the past he had been on Humira.  Currently on a rate of 20 mg on alternate days for reasons discussed previously including repeated infections.  He is very happy with how things have turned out for him.  He has tolerated leflunomide well recent labs are reviewed mild elevation of ALT noted.  He is going to continue to check his labs every 6 weeks.  Follow-up in 3 months.        Following is the excerpt from a previous note:   He reports that his symptoms began nearly 10 years ago.  This was in his feet.  He would be told that this is plantar fasciitis.  He will be given  "prednisone off and on when he would \"flareup\".  He would be given a boot.  5 years ago he started hurting even more so.  His wife is concerned that this represents more than plantar fasciitis.  Gradually other joints including knees, wrists and shoulders also started hurting.  2 years ago he started noticing pain and some swelling in the wrists.  About 18 months or so ago he \"blew up\" everywhere with swollen knees swollen ankles and wrists.  He was finally seen in rheumatology he was given intravenous infusions of steroids he recalls for several days and finally his pain came under control.  He has since tried methotrexate, he does not recall all the details but wonders if it was for 2 or 3 weeks, and he was then told that he should go on leflunomide which was associated with abnormal liver function studies and now he takes it every other day, he was started on Humira a year ago.  With this commendation he had an excellent control of his joint symptoms.  However since being on Humira he has had multiple episodes of various infectious conditions.  He has had multiple bladder infections shingles pleurisy and a gastric infection, apparently rotavirus.  He feels that he rather not take Humira indeed he has not taken it for the past 1 month.  There has been no flareup of his joint symptoms.  There is no personal family history of psoriasis ulcerative colitis or Crohn's disease.  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          ROS enquiry held for fever, ocular symptoms, rash, headache,  GI issues.  Today we also discussed the issues related to the current pandemic, the pros and cons of the current treatment plan, the CDC guidelines such as social distancing washing the hands covering the cough.  ALLERGIES:Patient has no known allergies.    PAST MEDICAL/ACTIVE PROBLEMS/MEDICATION/SOCIAL DATA  No past medical history on file.  History   Smoking Status     Current Some Day Smoker     Types: " Cigarettes, Cigarettes     Start date: 1/1/2004   Smokeless Tobacco     Never Used     Patient Active Problem List   Diagnosis     Precordial pain     Community acquired pneumonia, bilateral     Current Outpatient Medications   Medication Sig Dispense Refill     Cholecalciferol (VITAMIN D3 PO) Take 100 mcg by mouth daily       fish oil-omega-3 fatty acids 500 MG capsule Take 1 capsule by mouth daily       leflunomide (ARAVA) 20 MG tablet Take 1 tablet (20 mg) by mouth every other day 45 tablet 0     MEDICATION CANNOT BE REORDERED - PLEASE MANUALLY REORDER AND DISCONTINUE THE OLD ORDER Inject 40 mg Subcutaneous every 14 days Humira       Multiple Vitamin (MULTI VITAMIN) TABS Take 1 tablet by mouth daily       omeprazole (PRILOSEC) 40 MG capsule [OMEPRAZOLE (PRILOSEC) 40 MG CAPSULE] Take 40 mg by mouth 2 (two) times a day before meals.       UNABLE TO FIND MEDICATION NAME: turmuric- take 1 tab every day       docusate sodium (COLACE) 100 MG capsule [DOCUSATE SODIUM (COLACE) 100 MG CAPSULE] Take 1 capsule (100 mg total) by mouth daily as needed for constipation. (Patient not taking: Reported on 8/23/2021)  0     ergocalciferol (VITAMIN D2) 1,250 mcg (50,000 unit) capsule Take 50,000 Units by mouth every 7 days  (Patient not taking: Reported on 12/21/2021)       traZODone (DESYREL) 50 MG tablet [TRAZODONE (DESYREL) 50 MG TABLET] Take 100 mg by mouth at bedtime as needed for sleep. (Patient not taking: Reported on 12/21/2021)           EXAMINATION:    Using the audio and video link as best as possible the constitutional, neck, neurologic, psych, skin, both upper extremities areas/organ system were evaluated during this assessment.  Some of the important findings: Alert, oriented, speech fluent.    Able to fully flex the digits, into fists bilaterally, wrist and elbow range of motion appear normal, abduction of the shoulder is normal.      LAB / IMAGING DATA:  ALT   Date Value Ref Range Status   12/13/2021 47 (H) 0 - 45  U/L Final   11/04/2021 45 0 - 45 U/L Final   09/09/2021 44 0 - 45 U/L Final     Albumin   Date Value Ref Range Status   12/13/2021 3.8 3.5 - 5.0 g/dL Final   11/04/2021 4.0 3.5 - 5.0 g/dL Final   09/09/2021 3.9 3.5 - 5.0 g/dL Final       WBC Count   Date Value Ref Range Status   12/13/2021 6.9 4.0 - 11.0 10e3/uL Final   11/04/2021 7.9 4.0 - 11.0 10e3/uL Final     Hemoglobin   Date Value Ref Range Status   12/13/2021 14.9 13.3 - 17.7 g/dL Final   11/04/2021 15.8 13.3 - 17.7 g/dL Final   09/09/2021 15.2 13.3 - 17.7 g/dL Final     Platelet Count   Date Value Ref Range Status   12/13/2021 193 150 - 450 10e3/uL Final   11/04/2021 218 150 - 450 10e3/uL Final   09/09/2021 219 150 - 450 10e3/uL Final       No results found for: SANTOS

## 2022-01-11 ENCOUNTER — TELEPHONE (OUTPATIENT)
Dept: RHEUMATOLOGY | Facility: CLINIC | Age: 52
End: 2022-01-11
Payer: COMMERCIAL

## 2022-01-25 ENCOUNTER — LAB (OUTPATIENT)
Dept: LAB | Facility: CLINIC | Age: 52
End: 2022-01-25
Payer: COMMERCIAL

## 2022-01-25 DIAGNOSIS — M25.50 POLYARTHRALGIA: ICD-10-CM

## 2022-01-25 LAB
ALBUMIN SERPL-MCNC: 4 G/DL (ref 3.5–5)
ALT SERPL W P-5'-P-CCNC: 55 U/L (ref 0–45)
CREAT SERPL-MCNC: 0.84 MG/DL (ref 0.7–1.3)
ERYTHROCYTE [DISTWIDTH] IN BLOOD BY AUTOMATED COUNT: 13.6 % (ref 10–15)
GFR SERPL CREATININE-BSD FRML MDRD: >90 ML/MIN/1.73M2
HCT VFR BLD AUTO: 48.7 % (ref 40–53)
HGB BLD-MCNC: 15.5 G/DL (ref 13.3–17.7)
MCH RBC QN AUTO: 28.1 PG (ref 26.5–33)
MCHC RBC AUTO-ENTMCNC: 31.8 G/DL (ref 31.5–36.5)
MCV RBC AUTO: 88 FL (ref 78–100)
PLATELET # BLD AUTO: 195 10E3/UL (ref 150–450)
RBC # BLD AUTO: 5.51 10E6/UL (ref 4.4–5.9)
WBC # BLD AUTO: 6.7 10E3/UL (ref 4–11)

## 2022-01-25 PROCEDURE — 84460 ALANINE AMINO (ALT) (SGPT): CPT

## 2022-01-25 PROCEDURE — 82040 ASSAY OF SERUM ALBUMIN: CPT

## 2022-01-25 PROCEDURE — 85027 COMPLETE CBC AUTOMATED: CPT

## 2022-01-25 PROCEDURE — 82565 ASSAY OF CREATININE: CPT

## 2022-01-25 PROCEDURE — 36415 COLL VENOUS BLD VENIPUNCTURE: CPT

## 2022-03-04 ENCOUNTER — TELEPHONE (OUTPATIENT)
Dept: RHEUMATOLOGY | Facility: CLINIC | Age: 52
End: 2022-03-04
Payer: COMMERCIAL

## 2022-03-04 NOTE — TELEPHONE ENCOUNTER
I called the pt and informed that he does not need to stop the leflunomide to take prednisone. The pt understands.

## 2022-03-04 NOTE — TELEPHONE ENCOUNTER
[facility] Call Center    Phone Message    May a detailed message be left on voicemail: yes     Reason for Call: Symptoms or Concerns     If patient has red-flag symptoms, warm transfer to triage line    Current symptom or concern:  Pt is having Right foot pain and he was told by Dr. Butt he could take the Prednisone to help when he has these pain.     Pt does not recall does he have to stop taking the Leflunomide when he is taking the prednisone?    Symptoms have been present for: 10  hour(s)    Has patient previously been seen for this? Yes    By: Filomena      Are there any new or worsening symptoms? No, not new but is having pain since last night    Please call the Pt back to let him know. Please leave a detailed message if it goes to  for Pt is in [place] and may not hear or get a call.

## 2022-03-09 ENCOUNTER — LAB (OUTPATIENT)
Dept: LAB | Facility: CLINIC | Age: 52
End: 2022-03-09
Payer: COMMERCIAL

## 2022-03-09 DIAGNOSIS — M25.50 POLYARTHRALGIA: ICD-10-CM

## 2022-03-09 LAB
ALBUMIN SERPL-MCNC: 3.9 G/DL (ref 3.5–5)
ALT SERPL W P-5'-P-CCNC: 54 U/L (ref 0–45)
CREAT SERPL-MCNC: 1.3 MG/DL (ref 0.7–1.3)
ERYTHROCYTE [DISTWIDTH] IN BLOOD BY AUTOMATED COUNT: 13.1 % (ref 10–15)
GFR SERPL CREATININE-BSD FRML MDRD: 67 ML/MIN/1.73M2
HCT VFR BLD AUTO: 49.7 % (ref 40–53)
HGB BLD-MCNC: 15.9 G/DL (ref 13.3–17.7)
MCH RBC QN AUTO: 27.8 PG (ref 26.5–33)
MCHC RBC AUTO-ENTMCNC: 32 G/DL (ref 31.5–36.5)
MCV RBC AUTO: 87 FL (ref 78–100)
PLATELET # BLD AUTO: 263 10E3/UL (ref 150–450)
RBC # BLD AUTO: 5.71 10E6/UL (ref 4.4–5.9)
WBC # BLD AUTO: 13.2 10E3/UL (ref 4–11)

## 2022-03-09 PROCEDURE — 84460 ALANINE AMINO (ALT) (SGPT): CPT

## 2022-03-09 PROCEDURE — 82565 ASSAY OF CREATININE: CPT

## 2022-03-09 PROCEDURE — 82040 ASSAY OF SERUM ALBUMIN: CPT

## 2022-03-09 PROCEDURE — 85027 COMPLETE CBC AUTOMATED: CPT

## 2022-03-09 PROCEDURE — 36415 COLL VENOUS BLD VENIPUNCTURE: CPT

## 2022-03-21 ENCOUNTER — VIRTUAL VISIT (OUTPATIENT)
Dept: RHEUMATOLOGY | Facility: CLINIC | Age: 52
End: 2022-03-21
Payer: COMMERCIAL

## 2022-03-21 DIAGNOSIS — M25.50 POLYARTHRALGIA: ICD-10-CM

## 2022-03-21 DIAGNOSIS — Z79.899 HIGH RISK MEDICATION USE: ICD-10-CM

## 2022-03-21 DIAGNOSIS — M06.4 INFLAMMATORY POLYARTHRITIS (H): Primary | ICD-10-CM

## 2022-03-21 PROCEDURE — 99214 OFFICE O/P EST MOD 30 MIN: CPT | Mod: 95 | Performed by: INTERNAL MEDICINE

## 2022-03-21 RX ORDER — CHOLECALCIFEROL (VITAMIN D3) 100000/G
100 POWDER (GRAM) MISCELLANEOUS
COMMUNITY

## 2022-03-21 RX ORDER — AMITRIPTYLINE HYDROCHLORIDE 10 MG/1
TABLET ORAL
COMMUNITY

## 2022-03-21 RX ORDER — PREDNISONE 20 MG/1
TABLET ORAL
COMMUNITY

## 2022-03-21 NOTE — PROGRESS NOTES
Rigoberto is a 51 year old who is being evaluated via a billable video visit.      How would you like to obtain your AVS? MyChart  If the video visit is dropped, the invitation should be resent by: Text to cell phone: [phone]  Will anyone else be joining your video visit? No      Video Start Time: 1:06 PM  Video-Visit Details    Type of service:  Video Visit    Video End Time:1:13 PM    Originating Location (pt. Location): Home    Distant Location (provider location):  [facility]     Platform used for Video Visit: [email or web address]    This document was created using a software with less than 100% fidelity, at times resulting in unintended, even erroneous syntax and grammar.  The reader is advised to keep this under consideration while reviewing, interpreting this note.           ASSESSMENT AND PLAN:    Diagnoses and all orders for this visit:  Inflammatory polyarthritis (H)  Polyarthralgia  High risk medication use      Follow up in 6 months      HISTORY OF PRESENTING ILLNESS:  Rigoberto Laird 51 year old is evaluated here via video/audio link.   linfollow-up of  inflammatory polyarthritis which is in keeping with a seronegative rheumatoid arthritis morphology, in the past he had been on Humira.  Currently on a rate of 20 mg on alternate days for reasons discussed previously including repeated infections.  He is very happy with how things have turned out for him.  He has tolerated leflunomide well recent labs are reviewed mild elevation of ALT noted.  He is going to continue to check his labs every 6 weeks.  Follow-up in 3 months.         ROS enquiry held for fever, ocular symptoms, rash, headache,  GI issues.  Today we also discussed the issues related to the current pandemic, the pros and cons of the current treatment plan, the CDC guidelines such as social distancing washing the hands covering the cough.  ALLERGIES:Patient has no known allergies.    PAST MEDICAL/ACTIVE PROBLEMS/MEDICATION/SOCIAL  DATA  No past medical history on file.  History   Smoking Status     Current Some Day Smoker     Types: Cigarettes, Cigarettes     Start date: 1/1/2004   Smokeless Tobacco     Never Used     Patient Active Problem List   Diagnosis     Precordial pain     Community acquired pneumonia, bilateral     Current Outpatient Medications   Medication Sig Dispense Refill     Cholecalciferol (VITAMIN D3 PO) Take 100 mcg by mouth daily       docusate sodium (COLACE) 100 MG capsule [DOCUSATE SODIUM (COLACE) 100 MG CAPSULE] Take 1 capsule (100 mg total) by mouth daily as needed for constipation. (Patient not taking: Reported on 8/23/2021)  0     ergocalciferol (VITAMIN D2) 1,250 mcg (50,000 unit) capsule Take 50,000 Units by mouth every 7 days  (Patient not taking: Reported on 12/21/2021)       fish oil-omega-3 fatty acids 500 MG capsule Take 1 capsule by mouth daily       leflunomide (ARAVA) 20 MG tablet Take 1 tablet (20 mg) by mouth every other day 45 tablet 0     MEDICATION CANNOT BE REORDERED - PLEASE MANUALLY REORDER AND DISCONTINUE THE OLD ORDER Inject 40 mg Subcutaneous every 14 days Humira       Multiple Vitamin (MULTI VITAMIN) TABS Take 1 tablet by mouth daily       omeprazole (PRILOSEC) 40 MG capsule [OMEPRAZOLE (PRILOSEC) 40 MG CAPSULE] Take 40 mg by mouth 2 (two) times a day before meals.       traZODone (DESYREL) 50 MG tablet [TRAZODONE (DESYREL) 50 MG TABLET] Take 100 mg by mouth at bedtime as needed for sleep. (Patient not taking: Reported on 12/21/2021)       UNABLE TO FIND MEDICATION NAME: turmuric- take 1 tab every day           EXAMINATION:    On the phone sounded comfortable, alert, oriented, speech was fluent,  memory recall appeared normal, didnot sound like was in pain or short of breath.  The video stopped connection was intermittent..    LAB / IMAGING DATA:  ALT   Date Value Ref Range Status   03/09/2022 54 (H) 0 - 45 U/L Final   01/25/2022 55 (H) 0 - 45 U/L Final   12/13/2021 47 (H) 0 - 45 U/L Final      Albumin   Date Value Ref Range Status   03/09/2022 3.9 3.5 - 5.0 g/dL Final   01/25/2022 4.0 3.5 - 5.0 g/dL Final   12/13/2021 3.8 3.5 - 5.0 g/dL Final       WBC Count   Date Value Ref Range Status   03/09/2022 13.2 (H) 4.0 - 11.0 10e3/uL Final   01/25/2022 6.7 4.0 - 11.0 10e3/uL Final     Hemoglobin   Date Value Ref Range Status   03/09/2022 15.9 13.3 - 17.7 g/dL Final   01/25/2022 15.5 13.3 - 17.7 g/dL Final   12/13/2021 14.9 13.3 - 17.7 g/dL Final     Platelet Count   Date Value Ref Range Status   03/09/2022 263 150 - 450 10e3/uL Final   01/25/2022 195 150 - 450 10e3/uL Final   12/13/2021 193 150 - 450 10e3/uL Final       No results found for: SANOTS

## 2022-04-14 ENCOUNTER — TELEPHONE (OUTPATIENT)
Dept: RHEUMATOLOGY | Facility: CLINIC | Age: 52
End: 2022-04-14
Payer: COMMERCIAL

## 2022-04-14 NOTE — TELEPHONE ENCOUNTER
[facility] Call Center    Phone Message    May a detailed message be left on voicemail: yes     Reason for Call: Other: Pt is calling to schedule follow up and labs. Pt was schedule for 6 month follow up. When would  Dr Butt like labs drawn?    Action Taken: Message routed to:  Other: Rheumatology Support Pool    Travel Screening: Not Applicable

## 2022-04-26 ENCOUNTER — LAB (OUTPATIENT)
Dept: LAB | Facility: CLINIC | Age: 52
End: 2022-04-26
Payer: COMMERCIAL

## 2022-04-26 DIAGNOSIS — M25.50 POLYARTHRALGIA: ICD-10-CM

## 2022-04-26 LAB
ALBUMIN SERPL-MCNC: 4 G/DL (ref 3.5–5)
ALT SERPL W P-5'-P-CCNC: 77 U/L (ref 0–45)
CREAT SERPL-MCNC: 0.8 MG/DL (ref 0.7–1.3)
ERYTHROCYTE [DISTWIDTH] IN BLOOD BY AUTOMATED COUNT: 13.3 % (ref 10–15)
GFR SERPL CREATININE-BSD FRML MDRD: >90 ML/MIN/1.73M2
HCT VFR BLD AUTO: 47.7 % (ref 40–53)
HGB BLD-MCNC: 15.9 G/DL (ref 13.3–17.7)
MCH RBC QN AUTO: 28.6 PG (ref 26.5–33)
MCHC RBC AUTO-ENTMCNC: 33.3 G/DL (ref 31.5–36.5)
MCV RBC AUTO: 86 FL (ref 78–100)
PLATELET # BLD AUTO: 214 10E3/UL (ref 150–450)
RBC # BLD AUTO: 5.55 10E6/UL (ref 4.4–5.9)
WBC # BLD AUTO: 7.7 10E3/UL (ref 4–11)

## 2022-04-26 PROCEDURE — 84460 ALANINE AMINO (ALT) (SGPT): CPT

## 2022-04-26 PROCEDURE — 82040 ASSAY OF SERUM ALBUMIN: CPT

## 2022-04-26 PROCEDURE — 82565 ASSAY OF CREATININE: CPT

## 2022-04-26 PROCEDURE — 36415 COLL VENOUS BLD VENIPUNCTURE: CPT

## 2022-04-26 PROCEDURE — 85027 COMPLETE CBC AUTOMATED: CPT

## 2022-06-07 ENCOUNTER — LAB (OUTPATIENT)
Dept: LAB | Facility: CLINIC | Age: 52
End: 2022-06-07
Payer: COMMERCIAL

## 2022-06-07 DIAGNOSIS — R74.01 ELEVATED ALT MEASUREMENT: ICD-10-CM

## 2022-06-07 LAB — ALT SERPL W P-5'-P-CCNC: 62 U/L (ref 0–45)

## 2022-06-07 PROCEDURE — 84460 ALANINE AMINO (ALT) (SGPT): CPT

## 2022-06-07 PROCEDURE — 36415 COLL VENOUS BLD VENIPUNCTURE: CPT

## 2022-06-14 ENCOUNTER — MYC REFILL (OUTPATIENT)
Dept: RHEUMATOLOGY | Facility: CLINIC | Age: 52
End: 2022-06-14
Payer: COMMERCIAL

## 2022-06-14 DIAGNOSIS — M25.50 POLYARTHRALGIA: ICD-10-CM

## 2022-06-14 DIAGNOSIS — M06.4 INFLAMMATORY POLYARTHRITIS (H): ICD-10-CM

## 2022-06-14 RX ORDER — LEFLUNOMIDE 10 MG/1
TABLET ORAL
Qty: 30 TABLET | Refills: 0 | Status: SHIPPED | OUTPATIENT
Start: 2022-06-14 | End: 2022-07-29

## 2022-07-12 DIAGNOSIS — M06.4 INFLAMMATORY POLYARTHRITIS (H): Primary | ICD-10-CM

## 2022-07-19 ENCOUNTER — LAB (OUTPATIENT)
Dept: LAB | Facility: CLINIC | Age: 52
End: 2022-07-19
Payer: COMMERCIAL

## 2022-07-19 DIAGNOSIS — M06.4 INFLAMMATORY POLYARTHRITIS (H): ICD-10-CM

## 2022-07-19 LAB
ERYTHROCYTE [DISTWIDTH] IN BLOOD BY AUTOMATED COUNT: 13 % (ref 10–15)
HCT VFR BLD AUTO: 45.2 % (ref 40–53)
HGB BLD-MCNC: 15 G/DL (ref 13.3–17.7)
MCH RBC QN AUTO: 29.1 PG (ref 26.5–33)
MCHC RBC AUTO-ENTMCNC: 33.2 G/DL (ref 31.5–36.5)
MCV RBC AUTO: 88 FL (ref 78–100)
PLATELET # BLD AUTO: 201 10E3/UL (ref 150–450)
RBC # BLD AUTO: 5.16 10E6/UL (ref 4.4–5.9)
WBC # BLD AUTO: 7.5 10E3/UL (ref 4–11)

## 2022-07-19 PROCEDURE — 84460 ALANINE AMINO (ALT) (SGPT): CPT

## 2022-07-19 PROCEDURE — 36415 COLL VENOUS BLD VENIPUNCTURE: CPT

## 2022-07-19 PROCEDURE — 85027 COMPLETE CBC AUTOMATED: CPT

## 2022-07-19 PROCEDURE — 82565 ASSAY OF CREATININE: CPT

## 2022-07-19 PROCEDURE — 82040 ASSAY OF SERUM ALBUMIN: CPT

## 2022-07-20 LAB
ALBUMIN SERPL BCG-MCNC: 4.3 G/DL (ref 3.5–5.2)
ALT SERPL W P-5'-P-CCNC: 70 U/L (ref 10–50)
CREAT SERPL-MCNC: 0.88 MG/DL (ref 0.67–1.17)
GFR SERPL CREATININE-BSD FRML MDRD: >90 ML/MIN/1.73M2

## 2022-08-10 ENCOUNTER — LAB (OUTPATIENT)
Dept: LAB | Facility: CLINIC | Age: 52
End: 2022-08-10
Payer: COMMERCIAL

## 2022-08-10 DIAGNOSIS — M06.4 INFLAMMATORY POLYARTHRITIS (H): ICD-10-CM

## 2022-08-10 LAB
ALBUMIN SERPL BCG-MCNC: 4.5 G/DL (ref 3.5–5.2)
ALT SERPL W P-5'-P-CCNC: 71 U/L (ref 10–50)
CREAT SERPL-MCNC: 0.85 MG/DL (ref 0.67–1.17)
ERYTHROCYTE [DISTWIDTH] IN BLOOD BY AUTOMATED COUNT: 13.3 % (ref 10–15)
GFR SERPL CREATININE-BSD FRML MDRD: >90 ML/MIN/1.73M2
HCT VFR BLD AUTO: 46.8 % (ref 40–53)
HGB BLD-MCNC: 15.6 G/DL (ref 13.3–17.7)
MCH RBC QN AUTO: 28.9 PG (ref 26.5–33)
MCHC RBC AUTO-ENTMCNC: 33.3 G/DL (ref 31.5–36.5)
MCV RBC AUTO: 87 FL (ref 78–100)
PLATELET # BLD AUTO: 230 10E3/UL (ref 150–450)
RBC # BLD AUTO: 5.39 10E6/UL (ref 4.4–5.9)
WBC # BLD AUTO: 7.4 10E3/UL (ref 4–11)

## 2022-08-10 PROCEDURE — 36415 COLL VENOUS BLD VENIPUNCTURE: CPT

## 2022-08-10 PROCEDURE — 82565 ASSAY OF CREATININE: CPT

## 2022-08-10 PROCEDURE — 82040 ASSAY OF SERUM ALBUMIN: CPT

## 2022-08-10 PROCEDURE — 85027 COMPLETE CBC AUTOMATED: CPT

## 2022-08-10 PROCEDURE — 84460 ALANINE AMINO (ALT) (SGPT): CPT

## 2022-09-02 ENCOUNTER — LAB (OUTPATIENT)
Dept: LAB | Facility: CLINIC | Age: 52
End: 2022-09-02
Payer: COMMERCIAL

## 2022-09-02 DIAGNOSIS — M06.4 INFLAMMATORY POLYARTHRITIS (H): ICD-10-CM

## 2022-09-02 LAB
ALBUMIN SERPL BCG-MCNC: 4.5 G/DL (ref 3.5–5.2)
ALT SERPL W P-5'-P-CCNC: 55 U/L (ref 10–50)
CREAT SERPL-MCNC: 0.81 MG/DL (ref 0.67–1.17)
ERYTHROCYTE [DISTWIDTH] IN BLOOD BY AUTOMATED COUNT: 13.5 % (ref 10–15)
GFR SERPL CREATININE-BSD FRML MDRD: >90 ML/MIN/1.73M2
HCT VFR BLD AUTO: 46.4 % (ref 40–53)
HGB BLD-MCNC: 15.3 G/DL (ref 13.3–17.7)
MCH RBC QN AUTO: 28.7 PG (ref 26.5–33)
MCHC RBC AUTO-ENTMCNC: 33 G/DL (ref 31.5–36.5)
MCV RBC AUTO: 87 FL (ref 78–100)
PLATELET # BLD AUTO: 194 10E3/UL (ref 150–450)
RBC # BLD AUTO: 5.34 10E6/UL (ref 4.4–5.9)
WBC # BLD AUTO: 8.9 10E3/UL (ref 4–11)

## 2022-09-02 PROCEDURE — 82040 ASSAY OF SERUM ALBUMIN: CPT

## 2022-09-02 PROCEDURE — 85027 COMPLETE CBC AUTOMATED: CPT

## 2022-09-02 PROCEDURE — 36415 COLL VENOUS BLD VENIPUNCTURE: CPT

## 2022-09-02 PROCEDURE — 84460 ALANINE AMINO (ALT) (SGPT): CPT

## 2022-09-02 PROCEDURE — 82565 ASSAY OF CREATININE: CPT

## 2022-09-22 ENCOUNTER — OFFICE VISIT (OUTPATIENT)
Dept: RHEUMATOLOGY | Facility: CLINIC | Age: 52
End: 2022-09-22
Payer: COMMERCIAL

## 2022-09-22 VITALS
HEART RATE: 84 BPM | SYSTOLIC BLOOD PRESSURE: 132 MMHG | DIASTOLIC BLOOD PRESSURE: 82 MMHG | HEIGHT: 69 IN | BODY MASS INDEX: 31.62 KG/M2 | WEIGHT: 213.5 LBS

## 2022-09-22 DIAGNOSIS — M25.50 POLYARTHRALGIA: ICD-10-CM

## 2022-09-22 DIAGNOSIS — R74.01 ELEVATED ALT MEASUREMENT: ICD-10-CM

## 2022-09-22 DIAGNOSIS — M06.09 SERONEGATIVE RHEUMATOID ARTHRITIS OF MULTIPLE SITES (H): Primary | ICD-10-CM

## 2022-09-22 DIAGNOSIS — Z79.899 HIGH RISK MEDICATION USE: ICD-10-CM

## 2022-09-22 PROCEDURE — 99214 OFFICE O/P EST MOD 30 MIN: CPT | Performed by: INTERNAL MEDICINE

## 2022-09-22 RX ORDER — PREDNISONE 2.5 MG/1
TABLET ORAL
Qty: 90 TABLET | Refills: 2 | Status: SHIPPED | OUTPATIENT
Start: 2022-09-22 | End: 2023-05-19

## 2022-09-22 RX ORDER — HYDROXYCHLOROQUINE SULFATE 200 MG/1
200 TABLET, FILM COATED ORAL 2 TIMES DAILY
Qty: 60 TABLET | Refills: 3 | Status: SHIPPED | OUTPATIENT
Start: 2022-09-22 | End: 2022-10-22

## 2022-09-22 NOTE — PROGRESS NOTES
Rheumatology follow-up visit note     Rigoberto is a 52 year old male presents today for follow-up.    Rigoberto was seen today for recheck.    Diagnoses and all orders for this visit:    Seronegative rheumatoid arthritis of multiple sites (H)  -     hydroxychloroquine (PLAQUENIL) 200 MG tablet; Take 1 tablet (200 mg) by mouth 2 times daily for 30 days  -     predniSONE (DELTASONE) 2.5 MG tablet; 7.5 mg daily for 4 weeks, reduce by 2.5 mg every 4 weeks to 5 milligrams daily, and to 2.5 mg daily and stop    Polyarthralgia    Elevated ALT measurement    High risk medication use        His seronegative rheumatoid arthritis very well controlled with  However he continues to have abnormal liver function studies, it appears that he drinks perhaps 3-4 beers 3-4 times a week which is likely contributory factor, we discussed options, he is going to try hydroxychloroquine, he will take low-dose prednisone while this he gets rooted, discontinue leflunomide.  Follow-up in 3 months.    Follow up in 3 months.    HPI    Rigoberto Laird is a 52 year old male is here for follow-up of   seronegative rheumatoid arthritis, on leflunomide.  In the past he had been on Humira.  Currently on a rate of 10 mg of leflunomide on alternate days for reasons discussed previously including repeated infections and abnormal liver function studies.  He is very happy with how things have turned out for him.  He has tolerated leflunomide well recent labs are reviewed mild elevation of ALT noted before he started leflunomide his liver function studies had been abnormal such as in 2020 but at the time he was started on leflunomide those were normal and then after some time and there is become abnormal.  Alcohol intake as noted..  He is going to continue to check his labs every 6 weeks.  Follow-up in 3 months.          DETAILED EXAMINATION  09/22/22  :    Vitals:    09/22/22 1201   BP: 132/82   BP Location: Right arm   Patient Position: Sitting   Cuff Size:  "Adult Regular   Pulse: 84   Weight: 96.8 kg (213 lb 8 oz)   Height: 1.753 m (5' 9\")     Alert oriented. Head including the face is examined for malar rash, heliotropes, scarring, lupus pernio. Eyes examined for redness such as in episcleritis/scleritis, periorbital lesions.   Neck/ Face examined for parotid gland swelling, range of motion of neck.  Left upper and lower and right upper and lower extremities examined for tenderness, swelling, warmth of the appendicular joints, range of motion, edema, rash.  Some of the important findings included: he does not have evidence of synovitis in the palpable joints of the upper extremities.  No significant deformities of the digits.  no Heberden nodes.  Range of motion of the shoulders  show full abduction.  No JLT effusion or warmth of the knees.  he does not have dactylitis of the digits.     Patient Active Problem List    Diagnosis Date Noted     Community acquired pneumonia, bilateral 06/18/2020     Priority: Medium     Precordial pain 08/23/2019     Priority: Medium     No past surgical history on file.   No past medical history on file.  No Known Allergies  Current Outpatient Medications   Medication Sig Dispense Refill     amitriptyline (ELAVIL) 10 MG tablet amitriptyline 10 mg tablet   TAKE 1 TABLET BY MOUTH DAILY AT BEDTIME       Cholecalciferol (VITAMIN D3 PO) Take 100 mcg by mouth daily       Cholecalciferol (VITAMIN D3) 082696 UNIT/GM POWD Take 100 mcg by mouth       docusate sodium (COLACE) 100 MG capsule [DOCUSATE SODIUM (COLACE) 100 MG CAPSULE] Take 1 capsule (100 mg total) by mouth daily as needed for constipation.  0     ergocalciferol (VITAMIN D2) 1,250 mcg (50,000 unit) capsule Take 50,000 Units by mouth every 7 days        fish oil-omega-3 fatty acids 500 MG capsule Take 1 capsule by mouth daily       leflunomide (ARAVA) 10 MG tablet Take 10mg po every other day 15 tablet 0     MEDICATION CANNOT BE REORDERED - PLEASE MANUALLY REORDER AND DISCONTINUE THE " OLD ORDER Inject 40 mg Subcutaneous every 14 days Humira       Multiple Vitamin (MULTI VITAMIN) TABS Take 1 tablet by mouth daily       omeprazole (PRILOSEC) 40 MG capsule [OMEPRAZOLE (PRILOSEC) 40 MG CAPSULE] Take 40 mg by mouth 2 (two) times a day before meals.       predniSONE (DELTASONE) 20 MG tablet prn       traZODone (DESYREL) 50 MG tablet Take 100 mg by mouth nightly as needed        UNABLE TO FIND MEDICATION NAME: turmuric- take 1 tab every day       family history is not on file.  Social Connections: Not on file          WBC Count   Date Value Ref Range Status   09/02/2022 8.9 4.0 - 11.0 10e3/uL Final     RBC Count   Date Value Ref Range Status   09/02/2022 5.34 4.40 - 5.90 10e6/uL Final     Hemoglobin   Date Value Ref Range Status   09/02/2022 15.3 13.3 - 17.7 g/dL Final     Hematocrit   Date Value Ref Range Status   09/02/2022 46.4 40.0 - 53.0 % Final     MCV   Date Value Ref Range Status   09/02/2022 87 78 - 100 fL Final     MCH   Date Value Ref Range Status   09/02/2022 28.7 26.5 - 33.0 pg Final     Platelet Count   Date Value Ref Range Status   09/02/2022 194 150 - 450 10e3/uL Final     % Lymphocytes   Date Value Ref Range Status   09/09/2021 29 % Final     AST   Date Value Ref Range Status   11/02/2020 47 (H) 0 - 40 U/L Final     ALT   Date Value Ref Range Status   09/02/2022 55 (H) 10 - 50 U/L Final     Albumin   Date Value Ref Range Status   09/02/2022 4.5 3.5 - 5.2 g/dL Final   04/26/2022 4.0 3.5 - 5.0 g/dL Final     Alkaline Phosphatase   Date Value Ref Range Status   11/02/2020 107 45 - 120 U/L Final     Creatinine   Date Value Ref Range Status   09/02/2022 0.81 0.67 - 1.17 mg/dL Final     GFR Estimate   Date Value Ref Range Status   09/02/2022 >90 >60 mL/min/1.73m2 Final     Comment:     Effective December 21, 2021 eGFRcr in adults is calculated using the 2021 CKD-EPI creatinine equation which includes age and gender ( et al., NEJM, DOI: 10.1056/UOSYez5063700)   11/02/2020 >60 >60  mL/min/1.73m2 Final     GFR Estimate If Black   Date Value Ref Range Status   11/02/2020 >60 >60 mL/min/1.73m2 Final     Erythrocyte Sedimentation Rate   Date Value Ref Range Status   07/10/2020 2 0 - 15 mm/hr Final     CRP   Date Value Ref Range Status   09/09/2021 0.1 0.0-<0.8 mg/dL Final

## 2022-10-02 ENCOUNTER — HEALTH MAINTENANCE LETTER (OUTPATIENT)
Age: 52
End: 2022-10-02

## 2023-01-24 ENCOUNTER — NURSE TRIAGE (OUTPATIENT)
Dept: NURSING | Facility: CLINIC | Age: 53
End: 2023-01-24
Payer: COMMERCIAL

## 2023-01-24 DIAGNOSIS — M06.00 SERONEGATIVE RHEUMATOID ARTHRITIS (H): Primary | ICD-10-CM

## 2023-01-24 NOTE — TELEPHONE ENCOUNTER
Rigoberto calling requesting a refill for Plaquenil. He said it was denied because he needed an eye exam. He now has an eye exam scheduled for Feb. 7th at 740 am.  Sara Chamorro RN on 1/24/2023 at 2:15 PM    Reason for Disposition    Prescription refill request for NON-ESSENTIAL medicine (i.e., no harm to patient if med not taken) and triager unable to refill per department policy    Protocols used: MEDICATION REFILL AND RENEWAL CALL-A-[place]

## 2023-01-26 RX ORDER — HYDROXYCHLOROQUINE SULFATE 200 MG/1
200 TABLET, FILM COATED ORAL 2 TIMES DAILY
Qty: 90 TABLET | Refills: 0 | Status: SHIPPED | OUTPATIENT
Start: 2023-01-26 | End: 2023-04-10

## 2023-02-07 ENCOUNTER — TRANSFERRED RECORDS (OUTPATIENT)
Dept: HEALTH INFORMATION MANAGEMENT | Facility: CLINIC | Age: 53
End: 2023-02-07

## 2023-03-14 ENCOUNTER — LAB (OUTPATIENT)
Dept: LAB | Facility: CLINIC | Age: 53
End: 2023-03-14
Payer: COMMERCIAL

## 2023-03-14 DIAGNOSIS — M06.4 INFLAMMATORY POLYARTHRITIS (H): ICD-10-CM

## 2023-03-14 LAB
ALBUMIN SERPL BCG-MCNC: 4.4 G/DL (ref 3.5–5.2)
ALT SERPL W P-5'-P-CCNC: 59 U/L (ref 10–50)
CREAT SERPL-MCNC: 0.98 MG/DL (ref 0.67–1.17)
ERYTHROCYTE [DISTWIDTH] IN BLOOD BY AUTOMATED COUNT: 12.7 % (ref 10–15)
GFR SERPL CREATININE-BSD FRML MDRD: >90 ML/MIN/1.73M2
HCT VFR BLD AUTO: 46 % (ref 40–53)
HGB BLD-MCNC: 15.3 G/DL (ref 13.3–17.7)
MCH RBC QN AUTO: 28.8 PG (ref 26.5–33)
MCHC RBC AUTO-ENTMCNC: 33.3 G/DL (ref 31.5–36.5)
MCV RBC AUTO: 87 FL (ref 78–100)
PLATELET # BLD AUTO: 243 10E3/UL (ref 150–450)
RBC # BLD AUTO: 5.32 10E6/UL (ref 4.4–5.9)
WBC # BLD AUTO: 8.3 10E3/UL (ref 4–11)

## 2023-03-14 PROCEDURE — 85027 COMPLETE CBC AUTOMATED: CPT

## 2023-03-14 PROCEDURE — 84460 ALANINE AMINO (ALT) (SGPT): CPT

## 2023-03-14 PROCEDURE — 82040 ASSAY OF SERUM ALBUMIN: CPT

## 2023-03-14 PROCEDURE — 36415 COLL VENOUS BLD VENIPUNCTURE: CPT

## 2023-03-14 PROCEDURE — 82565 ASSAY OF CREATININE: CPT

## 2023-04-07 DIAGNOSIS — M06.00 SERONEGATIVE RHEUMATOID ARTHRITIS (H): ICD-10-CM

## 2023-04-10 RX ORDER — HYDROXYCHLOROQUINE SULFATE 200 MG/1
TABLET, FILM COATED ORAL
Qty: 180 TABLET | Refills: 0 | Status: SHIPPED | OUTPATIENT
Start: 2023-04-10 | End: 2023-08-14

## 2023-04-11 ENCOUNTER — MYC REFILL (OUTPATIENT)
Dept: RHEUMATOLOGY | Facility: CLINIC | Age: 53
End: 2023-04-11
Payer: COMMERCIAL

## 2023-04-11 DIAGNOSIS — M06.00 SERONEGATIVE RHEUMATOID ARTHRITIS (H): ICD-10-CM

## 2023-04-11 RX ORDER — HYDROXYCHLOROQUINE SULFATE 200 MG/1
TABLET, FILM COATED ORAL
Qty: 180 TABLET | Refills: 0 | OUTPATIENT
Start: 2023-04-11

## 2023-05-09 ENCOUNTER — OFFICE VISIT (OUTPATIENT)
Dept: RHEUMATOLOGY | Facility: CLINIC | Age: 53
End: 2023-05-09
Payer: COMMERCIAL

## 2023-05-09 ENCOUNTER — HOSPITAL ENCOUNTER (OUTPATIENT)
Dept: GENERAL RADIOLOGY | Facility: HOSPITAL | Age: 53
Discharge: HOME | End: 2023-05-09
Attending: INTERNAL MEDICINE | Admitting: INTERNAL MEDICINE
Payer: COMMERCIAL

## 2023-05-09 VITALS
BODY MASS INDEX: 31.7 KG/M2 | DIASTOLIC BLOOD PRESSURE: 80 MMHG | WEIGHT: 214 LBS | HEIGHT: 69 IN | HEART RATE: 76 BPM | SYSTOLIC BLOOD PRESSURE: 138 MMHG

## 2023-05-09 DIAGNOSIS — M25.50 POLYARTHRALGIA: ICD-10-CM

## 2023-05-09 DIAGNOSIS — R74.01 ELEVATED ALT MEASUREMENT: ICD-10-CM

## 2023-05-09 DIAGNOSIS — M06.00 SERONEGATIVE RHEUMATOID ARTHRITIS (H): ICD-10-CM

## 2023-05-09 DIAGNOSIS — M06.00 SERONEGATIVE RHEUMATOID ARTHRITIS (H): Primary | ICD-10-CM

## 2023-05-09 PROCEDURE — 73630 X-RAY EXAM OF FOOT: CPT | Mod: 50

## 2023-05-09 PROCEDURE — 99214 OFFICE O/P EST MOD 30 MIN: CPT | Performed by: INTERNAL MEDICINE

## 2023-05-09 NOTE — PROGRESS NOTES
"      Rheumatology follow-up visit note     Rigoberto is a 52 year old male presents today for follow-up.    Rigoberto was seen today for recheck.    Diagnoses and all orders for this visit:    Seronegative rheumatoid arthritis (H)  -     XR Foot Bilateral G/E 3 Views; Future    Polyarthralgia  -     XR Foot Bilateral G/E 3 Views; Future    Elevated ALT measurement        This patient with seronegative rheumatoid arthritis on hydroxychloroquine, intermittent polyarthralgias especially of the feet, concerns around right foot pain, does not have evidence of synovitis on today's evaluation he does have nailfold inflammation of the right big toe as noted I have asked him to check with the primary physician/podiatry.  We discussed the possibility of OA adding to his painful feet.  X-rays of the feet will be taken today.  I will ask him to return for follow-up.  In 2 months.      HPI    Rigoberto Laird is a 52 year old male is here for follow-up of seronegative rheumatoid arthritis, on hydroxychloroquine, leflunomide was discontinued in the past due to the abnormal liver function studies, prior to that he had been on Humira.  He has noted pain in his right foot centered around the big toe.  This is gone on recently.  He describes more activity recently with ambulation.  He has noted that sometimes when he is active the day before the next morning he can have more \"inflammation\" in his feet.  For the hydroxychloroquine he had his eyes examined and was reassured.  His liver function studies remain abnormal.  He has approximately 4 beers 3-4 times per week.    DETAILED EXAMINATION  05/09/23  :    Vitals:    05/09/23 1113   BP: 138/80   BP Location: Right arm   Patient Position: Sitting   Cuff Size: Adult Regular   Pulse: 76   Weight: 97.1 kg (214 lb)   Height: 1.753 m (5' 9\")     Alert oriented. Head including the face is examined for malar rash, heliotropes, scarring, lupus pernio. Eyes examined for redness such as in " episcleritis/scleritis, periorbital lesions.   Neck/ Face examined for parotid gland swelling, range of motion of neck.  Left upper and lower and right upper and lower extremities examined for tenderness, swelling, warmth of the appendicular joints, range of motion, edema, rash.  Some of the important findings included: he does not have evidence of synovitis in the palpable joints of the upper extremities.  He has redness tenderness and warmth of the nail fold of his right big toe, medially.  There is no synovitis and lower extremity joints.  He does not have dactylitis.  Patient Active Problem List    Diagnosis Date Noted     Community acquired pneumonia, bilateral 06/18/2020     Priority: Medium     Precordial pain 08/23/2019     Priority: Medium     No past surgical history on file.   No past medical history on file.  No Known Allergies  Current Outpatient Medications   Medication Sig Dispense Refill     Cholecalciferol (VITAMIN D3) 682294 UNIT/GM POWD Take 100 mcg by mouth       docusate sodium (COLACE) 100 MG capsule [DOCUSATE SODIUM (COLACE) 100 MG CAPSULE] Take 1 capsule (100 mg total) by mouth daily as needed for constipation.  0     ergocalciferol (VITAMIN D2) 1,250 mcg (50,000 unit) capsule Take 50,000 Units by mouth every 7 days        fish oil-omega-3 fatty acids 500 MG capsule Take 1 capsule by mouth daily       hydroxychloroquine (PLAQUENIL) 200 MG tablet TAKE 1 TABLET(200 MG) BY MOUTH TWICE DAILY FOR 45 DAYS 180 tablet 0     MEDICATION CANNOT BE REORDERED - PLEASE MANUALLY REORDER AND DISCONTINUE THE OLD ORDER Inject 40 mg Subcutaneous every 14 days Humira       Multiple Vitamin (MULTI VITAMIN) TABS Take 1 tablet by mouth daily       omeprazole (PRILOSEC) 40 MG capsule [OMEPRAZOLE (PRILOSEC) 40 MG CAPSULE] Take 40 mg by mouth 2 (two) times a day before meals.       UNABLE TO FIND MEDICATION NAME: turmuric- take 1 tab every day       amitriptyline (ELAVIL) 10 MG tablet amitriptyline 10 mg tablet   TAKE  1 TABLET BY MOUTH DAILY AT BEDTIME (Patient not taking: Reported on 5/9/2023)       Cholecalciferol (VITAMIN D3 PO) Take 100 mcg by mouth daily (Patient not taking: Reported on 5/9/2023)       predniSONE (DELTASONE) 10 MG tablet Take 1 tablet (10 mg) by mouth daily For 10 days in case of flare up (Patient not taking: Reported on 5/9/2023) 10 tablet 0     predniSONE (DELTASONE) 2.5 MG tablet 7.5 mg daily for 4 weeks, reduce by 2.5 mg every 4 weeks to 5 milligrams daily, and to 2.5 mg daily and stop (Patient not taking: Reported on 5/9/2023) 90 tablet 2     predniSONE (DELTASONE) 20 MG tablet prn (Patient not taking: Reported on 5/9/2023)       traZODone (DESYREL) 50 MG tablet Take 100 mg by mouth nightly as needed  (Patient not taking: Reported on 5/9/2023)       family history is not on file.  Social Connections: Not on file          WBC Count   Date Value Ref Range Status   03/14/2023 8.3 4.0 - 11.0 10e3/uL Final     RBC Count   Date Value Ref Range Status   03/14/2023 5.32 4.40 - 5.90 10e6/uL Final     Hemoglobin   Date Value Ref Range Status   03/14/2023 15.3 13.3 - 17.7 g/dL Final     Hematocrit   Date Value Ref Range Status   03/14/2023 46.0 40.0 - 53.0 % Final     MCV   Date Value Ref Range Status   03/14/2023 87 78 - 100 fL Final     MCH   Date Value Ref Range Status   03/14/2023 28.8 26.5 - 33.0 pg Final     Platelet Count   Date Value Ref Range Status   03/14/2023 243 150 - 450 10e3/uL Final     % Lymphocytes   Date Value Ref Range Status   09/09/2021 29 % Final     AST   Date Value Ref Range Status   11/02/2020 47 (H) 0 - 40 U/L Final     ALT   Date Value Ref Range Status   03/14/2023 59 (H) 10 - 50 U/L Final     Albumin   Date Value Ref Range Status   03/14/2023 4.4 3.5 - 5.2 g/dL Final   04/26/2022 4.0 3.5 - 5.0 g/dL Final     Alkaline Phosphatase   Date Value Ref Range Status   11/02/2020 107 45 - 120 U/L Final     Creatinine   Date Value Ref Range Status   03/14/2023 0.98 0.67 - 1.17 mg/dL Final      GFR Estimate   Date Value Ref Range Status   03/14/2023 >90 >60 mL/min/1.73m2 Final     Comment:     eGFR calculated using 2021 CKD-EPI equation.   11/02/2020 >60 >60 mL/min/1.73m2 Final     GFR Estimate If Black   Date Value Ref Range Status   11/02/2020 >60 >60 mL/min/1.73m2 Final     Erythrocyte Sedimentation Rate   Date Value Ref Range Status   07/10/2020 2 0 - 15 mm/hr Final     CRP   Date Value Ref Range Status   09/09/2021 0.1 0.0-<0.8 mg/dL Final

## 2023-05-14 ENCOUNTER — APPOINTMENT (OUTPATIENT)
Dept: MRI IMAGING | Facility: HOSPITAL | Age: 53
End: 2023-05-14
Attending: EMERGENCY MEDICINE
Payer: COMMERCIAL

## 2023-05-14 ENCOUNTER — HOSPITAL ENCOUNTER (EMERGENCY)
Facility: HOSPITAL | Age: 53
Discharge: HOME | End: 2023-05-14
Attending: EMERGENCY MEDICINE | Admitting: EMERGENCY MEDICINE
Payer: COMMERCIAL

## 2023-05-14 ENCOUNTER — NURSE TRIAGE (OUTPATIENT)
Dept: NURSING | Facility: CLINIC | Age: 53
End: 2023-05-14
Payer: COMMERCIAL

## 2023-05-14 ENCOUNTER — APPOINTMENT (OUTPATIENT)
Dept: ULTRASOUND IMAGING | Facility: HOSPITAL | Age: 53
End: 2023-05-14
Attending: EMERGENCY MEDICINE
Payer: COMMERCIAL

## 2023-05-14 VITALS
BODY MASS INDEX: 31.84 KG/M2 | SYSTOLIC BLOOD PRESSURE: 146 MMHG | HEART RATE: 83 BPM | WEIGHT: 215 LBS | DIASTOLIC BLOOD PRESSURE: 78 MMHG | OXYGEN SATURATION: 97 % | TEMPERATURE: 99 F | HEIGHT: 69 IN | RESPIRATION RATE: 18 BRPM

## 2023-05-14 DIAGNOSIS — L03.115 CELLULITIS OF RIGHT FOOT: ICD-10-CM

## 2023-05-14 LAB
ANION GAP SERPL CALCULATED.3IONS-SCNC: 11 MMOL/L (ref 7–15)
BASOPHILS # BLD AUTO: 0.1 10E3/UL (ref 0–0.2)
BASOPHILS NFR BLD AUTO: 1 %
BUN SERPL-MCNC: 10.8 MG/DL (ref 6–20)
CALCIUM SERPL-MCNC: 9.7 MG/DL (ref 8.6–10)
CHLORIDE SERPL-SCNC: 99 MMOL/L (ref 98–107)
CREAT SERPL-MCNC: 1 MG/DL (ref 0.67–1.17)
CRP SERPL-MCNC: 117.5 MG/L
DEPRECATED HCO3 PLAS-SCNC: 28 MMOL/L (ref 22–29)
EOSINOPHIL # BLD AUTO: 0.3 10E3/UL (ref 0–0.7)
EOSINOPHIL NFR BLD AUTO: 3 %
ERYTHROCYTE [DISTWIDTH] IN BLOOD BY AUTOMATED COUNT: 12.6 % (ref 10–15)
ERYTHROCYTE [SEDIMENTATION RATE] IN BLOOD BY WESTERGREN METHOD: 18 MM/HR (ref 0–20)
GFR SERPL CREATININE-BSD FRML MDRD: >90 ML/MIN/1.73M2
GLUCOSE SERPL-MCNC: 92 MG/DL (ref 70–99)
HCT VFR BLD AUTO: 48.5 % (ref 40–53)
HGB BLD-MCNC: 16.2 G/DL (ref 13.3–17.7)
IMM GRANULOCYTES # BLD: 0.1 10E3/UL
IMM GRANULOCYTES NFR BLD: 1 %
LACTATE SERPL-SCNC: 1.3 MMOL/L (ref 0.7–2)
LYMPHOCYTES # BLD AUTO: 1.8 10E3/UL (ref 0.8–5.3)
LYMPHOCYTES NFR BLD AUTO: 18 %
MCH RBC QN AUTO: 28.6 PG (ref 26.5–33)
MCHC RBC AUTO-ENTMCNC: 33.4 G/DL (ref 31.5–36.5)
MCV RBC AUTO: 86 FL (ref 78–100)
MONOCYTES # BLD AUTO: 0.8 10E3/UL (ref 0–1.3)
MONOCYTES NFR BLD AUTO: 9 %
NEUTROPHILS # BLD AUTO: 6.8 10E3/UL (ref 1.6–8.3)
NEUTROPHILS NFR BLD AUTO: 68 %
NRBC # BLD AUTO: 0 10E3/UL
NRBC BLD AUTO-RTO: 0 /100
PLATELET # BLD AUTO: 250 10E3/UL (ref 150–450)
POTASSIUM SERPL-SCNC: 4.1 MMOL/L (ref 3.4–5.3)
RBC # BLD AUTO: 5.67 10E6/UL (ref 4.4–5.9)
SODIUM SERPL-SCNC: 138 MMOL/L (ref 136–145)
URATE SERPL-MCNC: 6.9 MG/DL (ref 3.4–7)
WBC # BLD AUTO: 9.9 10E3/UL (ref 4–11)

## 2023-05-14 PROCEDURE — 80048 BASIC METABOLIC PNL TOTAL CA: CPT | Performed by: EMERGENCY MEDICINE

## 2023-05-14 PROCEDURE — 255N000002 HC RX 255 OP 636: Performed by: EMERGENCY MEDICINE

## 2023-05-14 PROCEDURE — 85025 COMPLETE CBC W/AUTO DIFF WBC: CPT | Performed by: EMERGENCY MEDICINE

## 2023-05-14 PROCEDURE — 83605 ASSAY OF LACTIC ACID: CPT | Performed by: EMERGENCY MEDICINE

## 2023-05-14 PROCEDURE — A9585 GADOBUTROL INJECTION: HCPCS | Performed by: EMERGENCY MEDICINE

## 2023-05-14 PROCEDURE — 86140 C-REACTIVE PROTEIN: CPT | Performed by: EMERGENCY MEDICINE

## 2023-05-14 PROCEDURE — 36415 COLL VENOUS BLD VENIPUNCTURE: CPT | Performed by: EMERGENCY MEDICINE

## 2023-05-14 PROCEDURE — 84550 ASSAY OF BLOOD/URIC ACID: CPT | Performed by: EMERGENCY MEDICINE

## 2023-05-14 PROCEDURE — 93971 EXTREMITY STUDY: CPT | Mod: RT

## 2023-05-14 PROCEDURE — 73720 MRI LWR EXTREMITY W/O&W/DYE: CPT | Mod: RT

## 2023-05-14 PROCEDURE — 85652 RBC SED RATE AUTOMATED: CPT | Performed by: EMERGENCY MEDICINE

## 2023-05-14 PROCEDURE — 99285 EMERGENCY DEPT VISIT HI MDM: CPT | Mod: 25

## 2023-05-14 RX ORDER — KETOROLAC TROMETHAMINE 15 MG/ML
15 INJECTION, SOLUTION INTRAMUSCULAR; INTRAVENOUS ONCE
Status: COMPLETED | OUTPATIENT
Start: 2023-05-14 | End: 2023-05-14

## 2023-05-14 RX ORDER — GADOBUTROL 604.72 MG/ML
10 INJECTION INTRAVENOUS ONCE
Status: COMPLETED | OUTPATIENT
Start: 2023-05-14 | End: 2023-05-14

## 2023-05-14 RX ADMIN — GADOBUTROL 10 ML: 604.72 INJECTION INTRAVENOUS at 18:38

## 2023-05-14 ASSESSMENT — ACTIVITIES OF DAILY LIVING (ADL)
ADLS_ACUITY_SCORE: 35
ADLS_ACUITY_SCORE: 35

## 2023-05-14 NOTE — TELEPHONE ENCOUNTER
Nurse Triage SBAR    Is this a 2nd Level Triage? YES, LICENSED PRACTITIONER REVIEW IS REQUIRED    Situation: Patient calling with purple foot and foot pain .  Consent: not needed    Background:  Saw provider last week - inflammation was starting.  Wanted to see him Friday but couldn't get in.  States it's gotten worse.  If he doesn't have foot elevated it turns purple.      States foot has been throbbing since Friday -  Started with big toe and has moved through his whole foot.    Throbbing pain - pain remains when pt elevates his foot.  Pain is better today than it was Friday.  States he has been alternating between tylenol and ibuprofen.  States he stopped taking antibiotic - states this is not an infection -  But does state the discoloration - the foot turning purple is new.      Started at toe - went across the bottom of the foot - then up to the ankle.   -  States this is normal for him when having a flare up -  But again - the discoloration is new.      Right foot.    Pt states he has been on crutches the whole weekend - can't bear any weight.  If he does, it gets worse.    * Pt states he did kneel and have a toe on that foot bent earlier this past week - but outside of that can't think of anything else that could trigger this.    * Numb all the way up to his ankle.       Assessment:     Protocol Recommended Disposition:       Recommendation: Advised patient to wait for call-back after speaking with on-call provider . Reviewed concerning symptoms and when to call back.     Paged to provider - Salma Butt takes his own calls after hours.  Paging  paged 1x and provider did not call back.  No answer on 1st phone call attempt and no answer on 2nd attempt).      Provider did call back at 3:44 and recommends this pt is seen in the ED at this time.     Writer spoke with pt at this time and advised to be seen in the ED.        Romana Owens, RN [facility] 5/14/2023 2:47 PM    Reason for  Disposition    Purple or black skin on foot or toe    Additional Information    Negative: Followed a foot injury    Negative: Diabetes mellitus    Negative: Toe pain is main symptom    Negative: Ankle pain is main symptom    Negative: Thigh or calf pain is main symptom    Negative: Entire foot is cool or blue in comparison to other foot    Protocols used: FOOT PAIN-A-AH

## 2023-05-14 NOTE — ED PROVIDER NOTES
EMERGENCY DEPARTMENT NOTE     Name: Rigoberto Laird    Age/Sex: 52 year old male   MRN: [id number]   Evaluation Date & Time:  5/14/2023  4:25 PM    PCP:    Sara Estrada   ED Provider: Gregorio Lackey D.O.       CHIEF COMPLAINT    Foot Pain       DIAGNOSIS & DISPOSITION     1. Cellulitis of right foot      DISPOSITION: Home    At the conclusion of the encounter I discussed the results of all of the tests and the disposition. The questions were answered. The patient or family acknowledged understanding and was agreeable with the care plan.    TOTAL CRITICAL CARE TIME (EXCLUDING PROCEDURES): Not applicable    PROCEDURES:   None    EMERGENCY DEPARTMENT COURSE/MEDICAL DECISION MAKING   4:35 PM I met with the patient to gather history and to perform my initial exam.  We discussed treatment options and the plan for care while in the Emergency Department. PPE: [id number] mask  8:01 PM I rechecked and updated the patient. We discussed the plan for discharge and the patient is agreeable. Reviewed supportive cares, symptomatic treatment, outpatient follow up, and reasons to return to the Emergency Department. Patient to be discharged by ED RN.     Rigoberto Laird is a 52 year old year old male with a relevant past history of Inflammatory polyarthritis and immunosuppression on hydroxycloroquine, who presents to this ED by walk in for evaluation of right foot pain, redness, and warmth.         Triage note reviewed:Pt comes in d/t arthritic pain starting 5/9/23, pt called his doctor who recommended he be seen d/t the foot becoming purple when it is not elevated. The patient denies a difference in temperature between feet.     Ibuprofen: at 1030 this morning     Differential  diagnosis  considered included but not limited to cellulitis, osteomyelitis, fasciitis, arterial occlusion, DVT, foot fracture, gout    Diagnostic studies:  Imaging:  US Lower Extremity Venous Duplex Right   Final Result   IMPRESSION:   1.  No deep venous thrombosis  "in the right lower extremity.      2.  Shotty node in the upper right thigh.      MR Foot Right w/o & w Contrast   Final Result   IMPRESSION:   1.  MRI findings suggesting first toe cellulitis.   2.  Negative for soft tissue fluid collection, joint effusion, or osteomyelitis.   3.  No acute myotendinous pathology.         Lab:  Labs Ordered and Resulted from Time of ED Arrival to Time of ED Departure   CRP INFLAMMATION - Abnormal       Result Value    CRP Inflammation 117.50 (*)    BASIC METABOLIC PANEL - Normal    Sodium 138      Potassium 4.1      Chloride 99      Carbon Dioxide (CO2) 28      Anion Gap 11      Urea Nitrogen 10.8      Creatinine 1.00      Calcium 9.7      Glucose 92      GFR Estimate >90     ERYTHROCYTE SEDIMENTATION RATE AUTO - Normal    Erythrocyte Sedimentation Rate 18     LACTIC ACID WHOLE BLOOD - Normal    Lactic Acid 1.3     URIC ACID - Normal    Uric Acid 6.9     CBC WITH PLATELETS AND DIFFERENTIAL    WBC Count 9.9      RBC Count 5.67      Hemoglobin 16.2      Hematocrit 48.5      MCV 86      MCH 28.6      MCHC 33.4      RDW 12.6      Platelet Count 250      % Neutrophils 68      % Lymphocytes 18      % Monocytes 9      % Eosinophils 3      % Basophils 1      % Immature Granulocytes 1      NRBCs per 100 WBC 0      Absolute Neutrophils 6.8      Absolute Lymphocytes 1.8      Absolute Monocytes 0.8      Absolute Eosinophils 0.3      Absolute Basophils 0.1      Absolute Immature Granulocytes 0.1      Absolute NRBCs 0.0        Interventions:None  Discharge Vital Signs:BP (!) 146/78   Pulse 83   Temp 99  F (37.2  C) (Oral)   Resp 18   Ht 1.753 m (5' 9\")   Wt 97.5 kg (215 lb)   SpO2 97%   BMI 31.75 kg/m    Medical Decision Making  Patient on exam and erythema over the dorsum of the foot spreading from the first and second toes.  No fluctuance.  Right ankle and right knee nonswollen nontender.  Strong dorsalis pedis and posterior tibial pulses.  Ultrasound negative for DVT.  MRI of the foot " consistent with cellulitis, no evidence of osteomyelitis or abscess.  Uric acid nonelevated.  WBC 9.9.  Clinical exam and MRI do not suggest fasciitis.  No evidence of osteomyelitis.  No evidence of DVT on ultrasound.  Positional color changes probably secondary to hyperemia.  Patient reported elevated uric acid to primary care physician but normal here and exam does not suggest gout and most consistent with cellulitis.  Patient will restart previously prescribed cephalexin 4 times a day elevate limited ambulation as possible.  He has follow-up with his rheumatologist tomorrow and will keep this appointment.  Return criteria discussed and if increased erythema of the foot spreading into the ankle region, development of fever will return to the emergency department.    History:  Supplemental history from: Documented in chart, if applicable and Family Member/Significant Other  External Record(s) reviewed: Documented in chart, if applicable. and Outpatient Record: 5/14/23 nurse triage call, 5/11/23 Primary care visit and labs, 5/9/23 rheumatology visit       Work Up:  Chart documentation includes differential considered and any EKGs or imaging independently interpreted by provider, where specified.  In additional to work up documented, I considered the following work up: Documented in chart, if applicable. and N/A    External consultation:  Discussion of management with another provider: Documented in chart, if applicable    Complicating factors:  Care impacted by chronic illness: Other: Inflammatory polyarthritis and immunosuppresion  Care affected by social determinants of health: N/A    Disposition considerations: Discharge. I recommended the patient continue their current prescription strength medication(s): Cephalexin. I considered admission, but ultimately discharged patient After current work-up.      @@@    ED INTERVENTIONS     Medications   ketorolac (TORADOL) injection 15 mg (15 mg Intravenous Not Given  5/14/23 1830)   ketorolac (TORADOL) injection 15 mg (15 mg Intravenous Not Given 5/14/23 1829)   gadobutrol (GADAVIST) injection 10 mL (10 mLs Intravenous $Given 5/14/23 1838)       DISCHARGE MEDICATIONS        Review of your medicines      UNREVIEWED medicines. Ask your doctor about these medicines      Dose / Directions   amitriptyline 10 MG tablet  Commonly known as: ELAVIL      amitriptyline 10 mg tablet   TAKE 1 TABLET BY MOUTH DAILY AT BEDTIME  Refills: 0     docusate sodium 100 MG capsule  Commonly known as: COLACE  Used for: Pneumonia of both lower lobes due to infectious organism      Dose: 100 mg  [DOCUSATE SODIUM (COLACE) 100 MG CAPSULE] Take 1 capsule (100 mg total) by mouth daily as needed for constipation.  Refills: 0     ergocalciferol 1.25 MG (69576 UT) capsule  Commonly known as: ERGOCALCIFEROL      Dose: 50,000 Units  Take 50,000 Units by mouth every 7 days  Refills: 0     hydroxychloroquine 200 MG tablet  Commonly known as: PLAQUENIL  Used for: Seronegative rheumatoid arthritis (H)      TAKE 1 TABLET(200 MG) BY MOUTH TWICE DAILY FOR 45 DAYS  Quantity: 180 tablet  Refills: 0     MEDICATION CANNOT BE REORDERED - PLEASE MANUALLY REORDER AND DISCONTINUE THE OLD ORDER      Dose: 40 mg  Inject 40 mg Subcutaneous every 14 days Humira  Refills: 0     Multi Vitamin Tabs      Dose: 1 tablet  Take 1 tablet by mouth daily  Refills: 0     omeprazole 40 MG DR capsule  Commonly known as: priLOSEC      Dose: 40 mg  [OMEPRAZOLE (PRILOSEC) 40 MG CAPSULE] Take 40 mg by mouth 2 (two) times a day before meals.  Refills: 0     * predniSONE 2.5 MG tablet  Commonly known as: DELTASONE  Used for: Seronegative rheumatoid arthritis of multiple sites (H)      7.5 mg daily for 4 weeks, reduce by 2.5 mg every 4 weeks to 5 milligrams daily, and to 2.5 mg daily and stop  Quantity: 90 tablet  Refills: 2     * predniSONE 10 MG tablet  Commonly known as: DELTASONE  Used for: Seronegative rheumatoid arthritis of multiple sites (H)       Dose: 10 mg  Take 1 tablet (10 mg) by mouth daily For 10 days in case of flare up  Quantity: 10 tablet  Refills: 0     * predniSONE 20 MG tablet  Commonly known as: DELTASONE      prn  Refills: 0     traZODone 50 MG tablet  Commonly known as: DESYREL      Dose: 100 mg  Take 100 mg by mouth nightly as needed  Refills: 0     UNABLE TO FIND      MEDICATION NAME: turmuric- take 1 tab every day  Refills: 0     Vitamin D3 298195 UNIT/GM Powd      Dose: 100 mcg  Take 100 mcg by mouth  Refills: 0     VITAMIN D3 PO      Dose: 100 mcg  Take 100 mcg by mouth daily  Refills: 0         * This list has 3 medication(s) that are the same as other medications prescribed for you. Read the directions carefully, and ask your doctor or other care provider to review them with you.            CONTINUE these medicines which have NOT CHANGED      Dose / Directions   fish oil-omega-3 fatty acids 500 MG capsule      Dose: 1 capsule  Take 1 capsule by mouth daily  Refills: 0              INFORMATION SOURCE AND LIMITATIONS    History/Exam limitations: NA  Patient information was obtained from: patient and wife  Use of : N/A    HISTORY OF PRESENT ILLNESS   Rigoberto Laird is a 52 year old year old male with a relevant past history of Inflammatory polyarthritis and immunosuppression on hydroxycloroquine, who presents to this ED by walk in for evaluation of right foot pain, redness, and warmth.    Patient reports waking up on Tuesday (5/9/23) with a red spot on his right big toe and mild pain in his toe. He had an appointment with his rheumatologist that day, who told him it was likely an ingrown toenail. He then saw his PCP at [facility], who performed tests for gout, and his uric acid came back at 8.0. Patient was taking antibiotics, but stopped them on Friday (5/12/23). Since then, his pain and redness have spread across his foot and he has associated warmth. The pain makes it difficult to stand on the right foot. He also  endorses numbness in his fourth and fifth toes. Patient denies any fever. Patient has a history of arthritis and is on hydroxychloroquine. He denies any history of gout. No other complaints or concerns expressed at this time.     REVIEW OF SYSTEMS:   All other systems reviewed and are negative except as noted above in HPI.    PATIENT HISTORY   History reviewed. No pertinent past medical history.  Patient Active Problem List   Diagnosis     Precordial pain     Community acquired pneumonia, bilateral     History reviewed. No pertinent surgical history.    No Known Allergies    OUTPATIENT MEDICATIONS     Discharge Medication List as of 5/14/2023  8:21 PM         Vitals:    05/14/23 1730 05/14/23 1745 05/14/23 1800 05/14/23 1956   BP: (!) 144/70 138/75 (!) 151/89 (!) 146/78   Pulse: 88 86 85 83   Resp:       Temp:       TempSrc:       SpO2: 96% 96% 96% 97%   Weight:       Height:           Physical Exam   Constitutional: Oriented to person, place, and time. Appears well-developed and well-nourished.   HEENT:    Head: Atraumatic.   Neck: Normal range of motion. Neck supple.   Cardiovascular: Normal rate, regular rhythm and normal heart sounds.    Pulmonary/Chest: Normal effort  and breath sounds normal.   Abdominal: Soft. Bowel sounds are normal.   Musculoskeletal: Normal range of motion. Erythema and swelling on dorsum of right foot. Strong dorsalis and posterior tibialgia pulses. No ankle, knee, or calf swelling.   Neurological: Alert and oriented to person, place, and time. Normal strength. No sensory deficit. No cranial nerve deficit.  Skin: Skin is warm and dry.   Psychiatric: Normal mood and affect. Behavior is normal. Thought content normal.     DIAGNOSTICS    LABORATORY FINDINGS (REVIEWED AND INTERPRETED):  Labs Ordered and Resulted from Time of ED Arrival to Time of ED Departure   CRP INFLAMMATION - Abnormal       Result Value    CRP Inflammation 117.50 (*)    BASIC METABOLIC PANEL - Normal    Sodium 138       Potassium 4.1      Chloride 99      Carbon Dioxide (CO2) 28      Anion Gap 11      Urea Nitrogen 10.8      Creatinine 1.00      Calcium 9.7      Glucose 92      GFR Estimate >90     ERYTHROCYTE SEDIMENTATION RATE AUTO - Normal    Erythrocyte Sedimentation Rate 18     LACTIC ACID WHOLE BLOOD - Normal    Lactic Acid 1.3     URIC ACID - Normal    Uric Acid 6.9     CBC WITH PLATELETS AND DIFFERENTIAL    WBC Count 9.9      RBC Count 5.67      Hemoglobin 16.2      Hematocrit 48.5      MCV 86      MCH 28.6      MCHC 33.4      RDW 12.6      Platelet Count 250      % Neutrophils 68      % Lymphocytes 18      % Monocytes 9      % Eosinophils 3      % Basophils 1      % Immature Granulocytes 1      NRBCs per 100 WBC 0      Absolute Neutrophils 6.8      Absolute Lymphocytes 1.8      Absolute Monocytes 0.8      Absolute Eosinophils 0.3      Absolute Basophils 0.1      Absolute Immature Granulocytes 0.1      Absolute NRBCs 0.0           IMAGING (REVIEWED AND INTERPRETED):  US Lower Extremity Venous Duplex Right   Final Result   IMPRESSION:   1.  No deep venous thrombosis in the right lower extremity.      2.  Shotty node in the upper right thigh.      MR Foot Right w/o & w Contrast   Final Result   IMPRESSION:   1.  MRI findings suggesting first toe cellulitis.   2.  Negative for soft tissue fluid collection, joint effusion, or osteomyelitis.   3.  No acute myotendinous pathology.          I, So Patiño, am serving as a scribe to document services personally performed by Gregorio aLckey D.O., based on my observation and the provider s statements to me.    I, Gregorio Lackey D.O., attest that So Patiño is acting in a scribe capacity, has observed my performance of the services and has documented them in accordance with my direction.    Gregorio Lackey D.O.  EMERGENCY MEDICINE   05/14/23  [facility] EMERGENCY DEPARTMENT  [address] [place] [address] [id number] [phone]  Dept:  [phone]     Gregorio Lackey DO  05/15/23 0133

## 2023-05-14 NOTE — ED TRIAGE NOTES
Pt comes in d/t arthritic pain starting 5/9/23, pt called his doctor who recommended he be seen d/t the foot becoming purple when it is not elevated. The patient denies a difference in temperature between feet.     Ibuprofen: at 1030 this morning

## 2023-05-15 ENCOUNTER — OFFICE VISIT (OUTPATIENT)
Dept: RHEUMATOLOGY | Facility: CLINIC | Age: 53
End: 2023-05-15
Payer: COMMERCIAL

## 2023-05-15 VITALS
BODY MASS INDEX: 31.84 KG/M2 | DIASTOLIC BLOOD PRESSURE: 78 MMHG | SYSTOLIC BLOOD PRESSURE: 124 MMHG | HEIGHT: 69 IN | WEIGHT: 215 LBS | HEART RATE: 100 BPM

## 2023-05-15 DIAGNOSIS — Z79.899 HIGH RISK MEDICATION USE: ICD-10-CM

## 2023-05-15 DIAGNOSIS — M25.50 POLYARTHRALGIA: ICD-10-CM

## 2023-05-15 DIAGNOSIS — M06.00 SERONEGATIVE RHEUMATOID ARTHRITIS (H): Primary | ICD-10-CM

## 2023-05-15 PROCEDURE — 99214 OFFICE O/P EST MOD 30 MIN: CPT | Performed by: INTERNAL MEDICINE

## 2023-05-15 RX ORDER — PREDNISONE 10 MG/1
15 TABLET ORAL DAILY
Qty: 23 TABLET | Refills: 0 | Status: SHIPPED | OUTPATIENT
Start: 2023-05-15 | End: 2023-05-30

## 2023-05-15 RX ORDER — CEPHALEXIN 500 MG/1
500 CAPSULE ORAL 4 TIMES DAILY
COMMUNITY
Start: 2023-05-11

## 2023-05-15 NOTE — PROGRESS NOTES
Rheumatology follow-up visit note     Rigoberto is a 52 year old male presents today for follow-up.    Rigoberto was seen today for recheck.    Diagnoses and all orders for this visit:    Seronegative rheumatoid arthritis (H)  -     predniSONE (DELTASONE) 10 MG tablet; Take 1.5 tablets (15 mg) by mouth daily for 15 days    Polyarthralgia  -     predniSONE (DELTASONE) 10 MG tablet; Take 1.5 tablets (15 mg) by mouth daily for 15 days    High risk medication use        This patient is here for follow-up today he has ample evidence of inflammatory polyarthritis affecting his right foot, multiple metatarsophalangeal joints, tenosynovitis right ankle, we discussed the differential the likelihood of this being septic polyarthritis appears to be remote, this is likely representation of his underlying inflammatory arthropathy thought to be seronegative rheumatoid arthritis flareup.  The coincidence of this happening with the nail fold infection is probably just that.  Diagnostically reassuring is the observation that similar sequence of events happened 3 years ago.  I have discussed these findings with him and his wife the complex elements of his symptoms were outlined.  We will start off with prednisone.  We will meet here on Friday.    HPI    Rigoberto Laird is a 52 year old male is here for follow-up.  He has inflammatory joint disease thought to be seronegative rheumatoid arthritis, had done well with leflunomide and hydroxychloroquine combination but could not take leflunomide anymore because of the liver function abnormalities.  He has been on hydroxychloroquine.  He was seen here last week when he had nailfold inflammation.  He was seen in primary physician and started on antibiotic.  Over the past few days starting Friday he has had worsening of the pain and swelling in his whole of the foot it appears to him purple, as he puts it, if he elevates the foot the color drains back to normal.  Today he is accompanied by his  "wife.  She noted that this is how things started back in 2020.  He showed me a picture on his cell phone from that date in 2020 and indeed the foot at that time did appears to be of similar hue as it is today.  In the interim he has been to the emergency room, DVT was ruled out, he had an MRI of the right foot that suggest cellulitis around the first toe likely depicting the nail fold inflammation.  He is unable to ambulate because of the severe pain in his right foot.      DETAILED EXAMINATION  05/15/23  :    Vitals:    05/15/23 0842   BP: 124/78   BP Location: Right arm   Patient Position: Sitting   Cuff Size: Adult Regular   Pulse: 100   Weight: 97.5 kg (215 lb)   Height: 1.753 m (5' 9\")     Alert oriented. Head including the face is examined for malar rash, heliotropes, scarring, lupus pernio. Eyes examined for redness such as in episcleritis/scleritis, periorbital lesions.   Neck/ Face examined for parotid gland swelling, range of motion of neck.  Left upper and lower and right upper and lower extremities examined for tenderness, swelling, warmth of the appendicular joints, range of motion, edema, rash.  Some of the important findings included: he does not have evidence of synovitis in the palpable joints of the upper extremities.  He has discoloration of his right foot as he lets it hang normally such as sitting in the chair but as he elevated the discoloration disappears.  He has inflammatory arthropathy affecting multiple joints including metatarsophalangeal joints 2 3 and 4 and also involvement of the tendons along the lateral aspect of his right ankle around the lateral malleolus.  He has intact dorsalis pedis artery on both the feet.       Patient Active Problem List    Diagnosis Date Noted     Community acquired pneumonia, bilateral 06/18/2020     Priority: Medium     Precordial pain 08/23/2019     Priority: Medium     No past surgical history on file.   No past medical history on file.  No Known " Allergies  Current Outpatient Medications   Medication Sig Dispense Refill     cephALEXin (KEFLEX) 500 MG capsule Take 500 mg by mouth 4 times daily       Cholecalciferol (VITAMIN D3) 098420 UNIT/GM POWD Take 100 mcg by mouth       docusate sodium (COLACE) 100 MG capsule [DOCUSATE SODIUM (COLACE) 100 MG CAPSULE] Take 1 capsule (100 mg total) by mouth daily as needed for constipation.  0     ergocalciferol (VITAMIN D2) 1,250 mcg (50,000 unit) capsule Take 50,000 Units by mouth every 7 days        fish oil-omega-3 fatty acids 500 MG capsule Take 1 capsule by mouth daily       hydroxychloroquine (PLAQUENIL) 200 MG tablet TAKE 1 TABLET(200 MG) BY MOUTH TWICE DAILY FOR 45 DAYS 180 tablet 0     MEDICATION CANNOT BE REORDERED - PLEASE MANUALLY REORDER AND DISCONTINUE THE OLD ORDER Inject 40 mg Subcutaneous every 14 days Humira       Multiple Vitamin (MULTI VITAMIN) TABS Take 1 tablet by mouth daily       omeprazole (PRILOSEC) 40 MG capsule [OMEPRAZOLE (PRILOSEC) 40 MG CAPSULE] Take 40 mg by mouth 2 (two) times a day before meals.       UNABLE TO FIND MEDICATION NAME: turmuric- take 1 tab every day       amitriptyline (ELAVIL) 10 MG tablet amitriptyline 10 mg tablet   TAKE 1 TABLET BY MOUTH DAILY AT BEDTIME (Patient not taking: Reported on 5/9/2023)       Cholecalciferol (VITAMIN D3 PO) Take 100 mcg by mouth daily (Patient not taking: Reported on 5/9/2023)       predniSONE (DELTASONE) 10 MG tablet Take 1 tablet (10 mg) by mouth daily For 10 days in case of flare up (Patient not taking: Reported on 5/9/2023) 10 tablet 0     predniSONE (DELTASONE) 2.5 MG tablet 7.5 mg daily for 4 weeks, reduce by 2.5 mg every 4 weeks to 5 milligrams daily, and to 2.5 mg daily and stop (Patient not taking: Reported on 5/9/2023) 90 tablet 2     predniSONE (DELTASONE) 20 MG tablet prn (Patient not taking: Reported on 5/9/2023)       traZODone (DESYREL) 50 MG tablet Take 100 mg by mouth nightly as needed  (Patient not taking: Reported on  5/9/2023)       family history is not on file.  Social Connections: Not on file          WBC Count   Date Value Ref Range Status   05/14/2023 9.9 4.0 - 11.0 10e3/uL Final     RBC Count   Date Value Ref Range Status   05/14/2023 5.67 4.40 - 5.90 10e6/uL Final     Hemoglobin   Date Value Ref Range Status   05/14/2023 16.2 13.3 - 17.7 g/dL Final     Hematocrit   Date Value Ref Range Status   05/14/2023 48.5 40.0 - 53.0 % Final     MCV   Date Value Ref Range Status   05/14/2023 86 78 - 100 fL Final     MCH   Date Value Ref Range Status   05/14/2023 28.6 26.5 - 33.0 pg Final     Platelet Count   Date Value Ref Range Status   05/14/2023 250 150 - 450 10e3/uL Final     % Lymphocytes   Date Value Ref Range Status   05/14/2023 18 % Final     AST   Date Value Ref Range Status   11/02/2020 47 (H) 0 - 40 U/L Final     ALT   Date Value Ref Range Status   03/14/2023 59 (H) 10 - 50 U/L Final     Albumin   Date Value Ref Range Status   03/14/2023 4.4 3.5 - 5.2 g/dL Final   04/26/2022 4.0 3.5 - 5.0 g/dL Final     Alkaline Phosphatase   Date Value Ref Range Status   11/02/2020 107 45 - 120 U/L Final     Creatinine   Date Value Ref Range Status   05/14/2023 1.00 0.67 - 1.17 mg/dL Final     GFR Estimate   Date Value Ref Range Status   05/14/2023 >90 >60 mL/min/1.73m2 Final     Comment:     eGFR calculated using 2021 CKD-EPI equation.   11/02/2020 >60 >60 mL/min/1.73m2 Final     GFR Estimate If Black   Date Value Ref Range Status   11/02/2020 >60 >60 mL/min/1.73m2 Final     Erythrocyte Sedimentation Rate   Date Value Ref Range Status   05/14/2023 18 0 - 20 mm/hr Final     CRP   Date Value Ref Range Status   09/09/2021 0.1 0.0-<0.8 mg/dL Final

## 2023-05-15 NOTE — DISCHARGE INSTRUCTIONS
Restart cephalexin 4 times daily.  Follow-up with your rheumatologist as scheduled tomorrow.  Return to the emergency department if progressive symptoms including increased redness or swelling of the foot or development of fever.

## 2023-05-19 ENCOUNTER — OFFICE VISIT (OUTPATIENT)
Dept: RHEUMATOLOGY | Facility: CLINIC | Age: 53
End: 2023-05-19
Payer: COMMERCIAL

## 2023-05-19 VITALS
HEART RATE: 77 BPM | DIASTOLIC BLOOD PRESSURE: 72 MMHG | OXYGEN SATURATION: 97 % | WEIGHT: 215 LBS | BODY MASS INDEX: 31.75 KG/M2 | SYSTOLIC BLOOD PRESSURE: 138 MMHG

## 2023-05-19 DIAGNOSIS — Z79.899 HIGH RISK MEDICATION USE: ICD-10-CM

## 2023-05-19 DIAGNOSIS — M06.00 SERONEGATIVE RHEUMATOID ARTHRITIS (H): Primary | ICD-10-CM

## 2023-05-19 DIAGNOSIS — M25.50 POLYARTHRALGIA: ICD-10-CM

## 2023-05-19 PROCEDURE — 99214 OFFICE O/P EST MOD 30 MIN: CPT | Performed by: INTERNAL MEDICINE

## 2023-05-19 NOTE — PROGRESS NOTES
Rheumatology follow-up visit note     Rigoberto is a 52 year old male presents today for follow-up.    Rigoberto was seen today for recheck.    Diagnoses and all orders for this visit:    Seronegative rheumatoid arthritis (H)    Polyarthralgia    High risk medication use      Gradually improving inflammatory polyarthritis affecting multiple joints of the right foot, preceded by nail fold infection, continue the course of prednisone, if needed 10 mg daily may be extended for 2 more weeks.  Follow-up in 3 months meanwhile continue leflunomide and hydroxychloroquine.  HPI    Rigoberto Laird is a 52 year old male is here for follow-up.  He was seen here a few days ago with acute inflammatory changes in his right foot preceded by a nail fold infection.  While he is followed up with seronegative rheumatoid arthritis on hydroxychloroquine leflunomide the presentation of his right foot involvement was somewhat atypical for the usual RA flareup 1 would expect in a patient like his trajectory of RA.  He does not carry diagnosis of gout.  He had an MRI of the right foot that is refer to that dictation.  He was on antibiotics for the nail fold infection.  He was started on prednisone.  In view of potentially multiplicity of differentials I had asked him to return for follow-up in quick order that he is here for.  He has noted no deterioration in his symptoms indeed he and his wife noted that there may be 25% improvement with 4 days of prednisone.  We discussed the key concern and question for today's visit first to make sure there is no worsening of his symptoms given still a small possibility that his nail fold infection could have gotten worse...  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  This is discussed with him.  This will be kept under consideration going forward.  I have asked  him to finish the course of prednisone.  If needed this may be extended at 10 mg daily for another 2 weeks.  We will meet here in 3 months or sooner.      Following is the excerpt from a previous note:    Of inflammatory joint disease thought to be seronegative rheumatoid arthritis, had done well with leflunomide and hydroxychloroquine combination but could not take leflunomide anymore because of the liver function abnormalities.  He has been on hydroxychloroquine.  He was seen here last week when he had nailfold inflammation.  He was seen in primary physician and started on antibiotic.  Over the past few days starting Friday he has had worsening of the pain and swelling in his whole of the foot it appears to him purple, as he puts it, if he elevates the foot the color drains back to normal.  Today he is accompanied by his wife.  She noted that this is how things started back in 2020.  He showed me a picture on his cell phone from that date in 2020 and indeed the foot at that time did appears to be of similar hue as it is today.  In the interim he has been to the emergency room, DVT was ruled out, he had an MRI of the right foot that suggest cellulitis around the first toe likely depicting the nail fold inflammation.  He is unable to ambulate because of the severe pain in his right foot.         DETAILED EXAMINATION  05/19/23  :    Vitals:    05/19/23 1231   BP: 138/72   BP Location: Right arm   Patient Position: Sitting   Cuff Size: Adult Regular   Pulse: 77   SpO2: 97%   Weight: 97.5 kg (215 lb)     Alert oriented. Head including the face is examined for malar rash, heliotropes, scarring, lupus pernio. Eyes examined for redness such as in episcleritis/scleritis, periorbital lesions.   Neck/ Face examined for parotid gland swelling, range of motion of neck.  Left upper and lower and right upper and lower extremities examined for tenderness, swelling, warmth of the appendicular joints, range of motion, edema, rash.   Some of the important findings included: he does not have evidence of synovitis in the palpable joints of the upper extremities.  There is significant and appreciable improvement in inflammatory changes of the right foot though he has residual hyperemia.  The swelling and tenderness in the metatarsophalangeal joints is improved.  Patient Active Problem List    Diagnosis Date Noted     Community acquired pneumonia, bilateral 06/18/2020     Priority: Medium     Precordial pain 08/23/2019     Priority: Medium     No past surgical history on file.   No past medical history on file.  No Known Allergies  Current Outpatient Medications   Medication Sig Dispense Refill     amitriptyline (ELAVIL) 10 MG tablet amitriptyline 10 mg tablet   TAKE 1 TABLET BY MOUTH DAILY AT BEDTIME (Patient not taking: Reported on 5/9/2023)       cephALEXin (KEFLEX) 500 MG capsule Take 500 mg by mouth 4 times daily       Cholecalciferol (VITAMIN D3 PO) Take 100 mcg by mouth daily (Patient not taking: Reported on 5/9/2023)       Cholecalciferol (VITAMIN D3) 458359 UNIT/GM POWD Take 100 mcg by mouth       docusate sodium (COLACE) 100 MG capsule [DOCUSATE SODIUM (COLACE) 100 MG CAPSULE] Take 1 capsule (100 mg total) by mouth daily as needed for constipation.  0     ergocalciferol (VITAMIN D2) 1,250 mcg (50,000 unit) capsule Take 50,000 Units by mouth every 7 days        fish oil-omega-3 fatty acids 500 MG capsule Take 1 capsule by mouth daily       hydroxychloroquine (PLAQUENIL) 200 MG tablet TAKE 1 TABLET(200 MG) BY MOUTH TWICE DAILY FOR 45 DAYS 180 tablet 0     MEDICATION CANNOT BE REORDERED - PLEASE MANUALLY REORDER AND DISCONTINUE THE OLD ORDER Inject 40 mg Subcutaneous every 14 days Humira       Multiple Vitamin (MULTI VITAMIN) TABS Take 1 tablet by mouth daily       omeprazole (PRILOSEC) 40 MG capsule [OMEPRAZOLE (PRILOSEC) 40 MG CAPSULE] Take 40 mg by mouth 2 (two) times a day before meals.       predniSONE (DELTASONE) 10 MG tablet Take 1.5  tablets (15 mg) by mouth daily for 15 days 23 tablet 0     predniSONE (DELTASONE) 10 MG tablet Take 1 tablet (10 mg) by mouth daily For 10 days in case of flare up (Patient not taking: Reported on 5/9/2023) 10 tablet 0     predniSONE (DELTASONE) 2.5 MG tablet 7.5 mg daily for 4 weeks, reduce by 2.5 mg every 4 weeks to 5 milligrams daily, and to 2.5 mg daily and stop (Patient not taking: Reported on 5/9/2023) 90 tablet 2     predniSONE (DELTASONE) 20 MG tablet prn (Patient not taking: Reported on 5/9/2023)       traZODone (DESYREL) 50 MG tablet Take 100 mg by mouth nightly as needed  (Patient not taking: Reported on 5/9/2023)       UNABLE TO FIND MEDICATION NAME: turmuric- take 1 tab every day       family history is not on file.  Social Connections: Not on file          WBC Count   Date Value Ref Range Status   05/14/2023 9.9 4.0 - 11.0 10e3/uL Final     RBC Count   Date Value Ref Range Status   05/14/2023 5.67 4.40 - 5.90 10e6/uL Final     Hemoglobin   Date Value Ref Range Status   05/14/2023 16.2 13.3 - 17.7 g/dL Final     Hematocrit   Date Value Ref Range Status   05/14/2023 48.5 40.0 - 53.0 % Final     MCV   Date Value Ref Range Status   05/14/2023 86 78 - 100 fL Final     MCH   Date Value Ref Range Status   05/14/2023 28.6 26.5 - 33.0 pg Final     Platelet Count   Date Value Ref Range Status   05/14/2023 250 150 - 450 10e3/uL Final     % Lymphocytes   Date Value Ref Range Status   05/14/2023 18 % Final     AST   Date Value Ref Range Status   11/02/2020 47 (H) 0 - 40 U/L Final     ALT   Date Value Ref Range Status   03/14/2023 59 (H) 10 - 50 U/L Final     Albumin   Date Value Ref Range Status   03/14/2023 4.4 3.5 - 5.2 g/dL Final   04/26/2022 4.0 3.5 - 5.0 g/dL Final     Alkaline Phosphatase   Date Value Ref Range Status   11/02/2020 107 45 - 120 U/L Final     Creatinine   Date Value Ref Range Status   05/14/2023 1.00 0.67 - 1.17 mg/dL Final     GFR Estimate   Date Value Ref Range Status   05/14/2023 >90 >60  mL/min/1.73m2 Final     Comment:     eGFR calculated using 2021 CKD-EPI equation.   11/02/2020 >60 >60 mL/min/1.73m2 Final     GFR Estimate If Black   Date Value Ref Range Status   11/02/2020 >60 >60 mL/min/1.73m2 Final     Erythrocyte Sedimentation Rate   Date Value Ref Range Status   05/14/2023 18 0 - 20 mm/hr Final     CRP   Date Value Ref Range Status   09/09/2021 0.1 0.0-<0.8 mg/dL Final

## 2023-07-18 ENCOUNTER — MEDICAL CORRESPONDENCE (OUTPATIENT)
Dept: HEALTH INFORMATION MANAGEMENT | Facility: CLINIC | Age: 53
End: 2023-07-18

## 2023-08-14 ENCOUNTER — MYC REFILL (OUTPATIENT)
Dept: RHEUMATOLOGY | Facility: CLINIC | Age: 53
End: 2023-08-14
Payer: COMMERCIAL

## 2023-08-14 DIAGNOSIS — M06.00 SERONEGATIVE RHEUMATOID ARTHRITIS (H): ICD-10-CM

## 2023-08-14 RX ORDER — HYDROXYCHLOROQUINE SULFATE 200 MG/1
200 TABLET, FILM COATED ORAL 2 TIMES DAILY
Qty: 180 TABLET | Refills: 0 | Status: SHIPPED | OUTPATIENT
Start: 2023-08-14

## 2023-08-22 ENCOUNTER — TRANSFERRED RECORDS (OUTPATIENT)
Dept: HEALTH INFORMATION MANAGEMENT | Facility: CLINIC | Age: 53
End: 2023-08-22
Payer: COMMERCIAL

## 2023-08-30 ENCOUNTER — MYC MEDICAL ADVICE (OUTPATIENT)
Dept: MULTI SPECIALTY CLINIC | Facility: CLINIC | Age: 53
End: 2023-08-30
Payer: COMMERCIAL

## 2023-09-08 NOTE — TELEPHONE ENCOUNTER
Pt called back and states he is not ready to schedule an appt with Dr. Butt right now, he is currently being seen and treated at the [facility]- Rheumatology.

## 2023-10-21 ENCOUNTER — HEALTH MAINTENANCE LETTER (OUTPATIENT)
Age: 53
End: 2023-10-21

## 2024-12-14 ENCOUNTER — HEALTH MAINTENANCE LETTER (OUTPATIENT)
Age: 54
End: 2024-12-14

## 2025-01-13 ENCOUNTER — TELEPHONE (OUTPATIENT)
Dept: FAMILY MEDICINE | Facility: CLINIC | Age: 55
End: 2025-01-13
Payer: COMMERCIAL

## 2025-01-13 NOTE — TELEPHONE ENCOUNTER
Left message with request to call back.  If patient returns please assist with scheduled NEW Establish Care appointment with any provider at any clinic.  1/15/25 appointment with [facility] will be cancelled as provider is out of the office/clinic on leave.

## 2025-02-21 ENCOUNTER — TELEPHONE (OUTPATIENT)
Dept: FAMILY MEDICINE | Facility: CLINIC | Age: 55
End: 2025-02-21

## 2025-02-21 PROBLEM — D84.9 IMMUNOSUPPRESSED STATUS: Status: ACTIVE | Noted: 2025-02-21

## 2025-02-21 PROBLEM — K21.9 GASTROESOPHAGEAL REFLUX DISEASE WITHOUT ESOPHAGITIS: Status: ACTIVE | Noted: 2023-08-08

## 2025-02-21 PROBLEM — M25.50 PAIN, JOINT, MULTIPLE SITES: Status: ACTIVE | Noted: 2025-02-21

## 2025-02-21 PROBLEM — C67.9 MALIGNANT NEOPLASM OF BLADDER (H): Status: ACTIVE | Noted: 2023-08-08

## 2025-02-21 PROBLEM — M10.9 ARTICULAR GOUT: Status: ACTIVE | Noted: 2023-08-23

## 2025-02-21 PROBLEM — K31.7 GASTRIC POLYP: Status: ACTIVE | Noted: 2025-02-21

## 2025-02-21 PROBLEM — K21.9 GASTROESOPHAGEAL REFLUX DISEASE WITHOUT ESOPHAGITIS: Status: RESOLVED | Noted: 2023-08-08 | Resolved: 2025-02-21

## 2025-02-21 PROBLEM — K62.89 PROCTALGIA: Status: ACTIVE | Noted: 2025-02-21

## 2025-02-21 PROBLEM — L40.50 PSORIASIS WITH ARTHROPATHY (H): Status: ACTIVE | Noted: 2020-05-01

## 2025-02-21 PROBLEM — N41.1 CHRONIC PROSTATITIS: Status: ACTIVE | Noted: 2023-08-08

## 2025-02-21 PROBLEM — M19.90 ARTHRITIS: Status: ACTIVE | Noted: 2020-01-01

## 2025-02-21 PROBLEM — J18.9 COMMUNITY ACQUIRED PNEUMONIA, BILATERAL: Status: RESOLVED | Noted: 2020-06-18 | Resolved: 2025-02-21

## 2025-02-21 PROBLEM — M06.4 INFLAMMATORY POLYARTHRITIS (H): Status: ACTIVE | Noted: 2022-01-24

## 2025-02-21 PROBLEM — M22.2X2 PATELLOFEMORAL SYNDROME, LEFT: Status: ACTIVE | Noted: 2025-02-21

## 2025-02-21 NOTE — TELEPHONE ENCOUNTER
Left message for patient to call back. When he calls back let him know that his appointment for this afternoon can not be a virtual visit and needs to be in person. Please see if able to come in person. Arrival time would be 4:10pm.

## 2025-02-25 DIAGNOSIS — M1A.9XX0 CHRONIC GOUT WITHOUT TOPHUS, UNSPECIFIED CAUSE, UNSPECIFIED SITE: ICD-10-CM

## 2025-02-25 NOTE — TELEPHONE ENCOUNTER
Incoming fax from pharmacy. Pt requesting 90 day rx.       Rigoberto Bansal Jr., CMA on 2/25/2025 at 1:25 PM

## 2025-02-26 ENCOUNTER — OFFICE VISIT (OUTPATIENT)
Dept: URGENT CARE | Facility: URGENT CARE | Age: 55
End: 2025-02-26
Payer: COMMERCIAL

## 2025-02-26 ENCOUNTER — TELEPHONE (OUTPATIENT)
Dept: URGENT CARE | Facility: URGENT CARE | Age: 55
End: 2025-02-26

## 2025-02-26 ENCOUNTER — HOSPITAL ENCOUNTER (OUTPATIENT)
Dept: GENERAL RADIOLOGY | Facility: HOSPITAL | Age: 55
Discharge: HOME | End: 2025-02-26
Attending: PHYSICIAN ASSISTANT
Payer: COMMERCIAL

## 2025-02-26 VITALS
SYSTOLIC BLOOD PRESSURE: 129 MMHG | OXYGEN SATURATION: 98 % | RESPIRATION RATE: 20 BRPM | TEMPERATURE: 99.2 F | HEART RATE: 78 BPM | DIASTOLIC BLOOD PRESSURE: 88 MMHG

## 2025-02-26 DIAGNOSIS — D50.9 IRON DEFICIENCY ANEMIA, UNSPECIFIED IRON DEFICIENCY ANEMIA TYPE: ICD-10-CM

## 2025-02-26 DIAGNOSIS — R74.01 TRANSAMINITIS: ICD-10-CM

## 2025-02-26 DIAGNOSIS — R05.1 ACUTE COUGH: ICD-10-CM

## 2025-02-26 DIAGNOSIS — R06.2 WHEEZING: ICD-10-CM

## 2025-02-26 DIAGNOSIS — E11.9 TYPE 2 DIABETES MELLITUS WITHOUT COMPLICATION, WITHOUT LONG-TERM CURRENT USE OF INSULIN (H): Primary | ICD-10-CM

## 2025-02-26 DIAGNOSIS — J11.1 INFLUENZA-LIKE ILLNESS: Primary | ICD-10-CM

## 2025-02-26 LAB
FLUAV AG SPEC QL IA: NEGATIVE
FLUAV RNA SPEC QL NAA+PROBE: POSITIVE
FLUBV AG SPEC QL IA: NEGATIVE
FLUBV RNA RESP QL NAA+PROBE: NEGATIVE
RSV RNA SPEC NAA+PROBE: NEGATIVE
SARS-COV-2 RNA RESP QL NAA+PROBE: NEGATIVE

## 2025-02-26 PROCEDURE — 71046 X-RAY EXAM CHEST 2 VIEWS: CPT

## 2025-02-26 RX ORDER — BENZONATATE 200 MG/1
200 CAPSULE ORAL 3 TIMES DAILY PRN
Qty: 30 CAPSULE | Refills: 0 | Status: SHIPPED | OUTPATIENT
Start: 2025-02-26

## 2025-02-26 RX ORDER — METFORMIN HYDROCHLORIDE 500 MG/1
500 TABLET, EXTENDED RELEASE ORAL
Qty: 90 TABLET | Refills: 1 | Status: SHIPPED | OUTPATIENT
Start: 2025-02-26

## 2025-02-26 RX ORDER — LANCETS
EACH MISCELLANEOUS
Qty: 100 EACH | Refills: 6 | Status: SHIPPED | OUTPATIENT
Start: 2025-02-26

## 2025-02-26 RX ORDER — ALBUTEROL SULFATE 90 UG/1
2 INHALANT RESPIRATORY (INHALATION) EVERY 4 HOURS PRN
Qty: 18 G | Refills: 0 | Status: SHIPPED | OUTPATIENT
Start: 2025-02-26

## 2025-02-26 RX ORDER — OSELTAMIVIR PHOSPHATE 75 MG/1
75 CAPSULE ORAL 2 TIMES DAILY
Qty: 10 CAPSULE | Refills: 0 | Status: SHIPPED | OUTPATIENT
Start: 2025-02-26 | End: 2025-03-03

## 2025-02-26 RX ORDER — ALLOPURINOL 100 MG/1
200 TABLET ORAL DAILY
Qty: 180 TABLET | Refills: 1 | Status: SHIPPED | OUTPATIENT
Start: 2025-02-26

## 2025-02-26 NOTE — TELEPHONE ENCOUNTER
Left VM for pt to call back. Please relayed result and provider message to pt. Will call at later time if no call back from pt.      Amrit Loza MA on 2/26/2025 at 5:26 PM

## 2025-02-26 NOTE — PATIENT INSTRUCTIONS
We will call with results of swab as it returns.    I am concerned this is early influenza not picked up on the initial test will start tamiflu and contact you with viral swabs.

## 2025-02-26 NOTE — TELEPHONE ENCOUNTER
----- Message from Crystal Molina sent at 2/26/2025  4:09 PM CST -----  Team - please call patient with results.  Please let him know his pcr influenza was positive, rsv and covid negative.   I have given him tamiflu as I was suspicious for influenza.

## 2025-02-26 NOTE — PROGRESS NOTES
Assessment & Plan     MDM: pt was seen for acute onset of subjective fever, muscle aches, cough and wheezing.  He is immune suppressed  with hx of inflammatory arthritis.  Rapid influenza was negative, CXR normal with no sign of infiltrate.  Suspicious clinically for influenza thus covering for that given he is immune suppressed with tamiflu awaiting PCR for covid 19, influenza and RSV.     Push fluids, rest and ibuprofen or tylenol for comfort.    Tessalon and albuterol trial for cough and wheezing.  At the end of the encounter, I discussed results, diagnosis, medications. Discussed red flags for immediate return to clinic/ER, as well as indications for follow up if no improvement. Patient understood and agreed to plan. Patient was stable for discharge        Acute cough    - XR Chest 2 Views  - Influenza A/B, RSV and SARS-CoV2 PCR (COVID-19)  - benzonatate (TESSALON) 200 MG capsule  Dispense: 30 capsule; Refill: 0  - Influenza A/B, RSV and SARS-CoV2 PCR (COVID-19) Nasopharyngeal    Wheezing  - albuterol (PROAIR HFA/PROVENTIL HFA/VENTOLIN HFA) 108 (90 Base) MCG/ACT inhaler  Dispense: 18 g; Refill: 0    Influenza-like illness  - oseltamivir (TAMIFLU) 75 MG capsule  Dispense: 10 capsule; Refill: 0       56}    Crystal Molina PA-C  [facility] URGENT [facility]     Rigoberto is a 54 year old male who presents to clinic today for the following health issues:  Chief Complaint   Patient presents with    Urgent Care     Coughing/congestion/headache/chest pressure from coughing/ wheezing/fever last night   Pt was exposed to flu on Saturday          HPI  PT is a 54 year old male, hx of inflammatory arthritis, tobacco use, presents to urgent care with concern re:   Mild Illness started feb 16th to 2-21 which was more like a mild cold.  Was better for several days,   Then 2 days ago got cough, fever, congestion.    Cough and wheezy at times.  Sigificant myalgias.   Immune suppressed.    Shaking chills  and night sweats.    Sob mild.    No hx of asthma.    No vomiting or diarrhea.    Exposed to a friend prior to onset of symptoms who is now hospitalized for influenza.       Review of Systems  Constitutional, HEENT, cardiovascular, pulmonary, gi and gu systems are negative, except as otherwise noted.      Objective    /88   Pulse 78   Temp 99.2  F (37.3  C)   Resp 20   SpO2 98%   Physical Exam   Pt is in no acute distress and appears well  Ears patent B:  TM s intact, non-injected. All land marks easily visibile    Nasal mucosa is non-edematous, no discharge.    Pharynx: non erythematous, tonsils non hypertrophied, No exudate   Neck supple: no adenopathy  Lungs: CTA  Heart: RRR, no murmur, no thrills or heaves   Ext: no edema  Skin: no rashes    Results for orders placed or performed during the hospital encounter of 02/26/25   XR Chest 2 Views     Status: None    Narrative    EXAM: XR CHEST 2 VIEWS  LOCATION: [facility]  DATE: 2/26/2025    INDICATION: immune suppression, cough, sob  evaluate for pna.  COMPARISON: July 24, 2020      Impression    IMPRESSION: Negative chest.   Results for orders placed or performed in visit on 02/26/25   Influenza A/B, RSV and SARS-CoV2 PCR (COVID-19) Nasopharyngeal     Status: Abnormal    Specimen: Nasopharyngeal; Swab   Result Value Ref Range    Influenza A PCR Positive (A) Negative    Influenza B PCR Negative Negative    RSV PCR Negative Negative    SARS CoV2 PCR Negative Negative    Narrative    Testing was performed using the Xpert Xpress CoV2/Flu/RSV Assay on the [employer] GeneXpert Instrument. This test should be ordered for the detection of SARS-CoV2, influenza, and RSV viruses in individuals with signs and symptoms of respiratory tract infection. This test is for in vitro diagnostic use under the [place] FDA for laboratories certified under CLIA to perform high or moderate complexity testing. This test has been [place] FDA cleared. A negative result  does not rule out the presence of PCR inhibitors in the specimen or target RNA in concentration below the limit of detection for the assay. If only one viral target is positive but coinfection with multiple targets is suspected, the sample should be re-tested with another FDA cleared, approved, or authorized test, if coninfection would change clinical management. This test was validated by the [facility]. These laboratories are certified under the Clinical Laboratory Improvement Amendments of 1988 (CLIA-88) as qualified to perfom high complexity laboratory testing.   Influenza A & B Antigen     Status: Normal    Specimen: Nose; Swab   Result Value Ref Range    Influenza A antigen Negative Negative    Influenza B antigen Negative Negative    Narrative    Test results must be correlated with clinical data. If necessary, results should be confirmed by a molecular assay or viral culture.

## 2025-02-27 NOTE — TELEPHONE ENCOUNTER
Called, spoke to patient and relayed providers message. Patient confirmed Tamiflu already picked up and started. No questions.    Renetta Chung MA on 02/27/2025 at 3:42 PM

## 2025-03-01 ENCOUNTER — OFFICE VISIT (OUTPATIENT)
Dept: URGENT CARE | Facility: URGENT CARE | Age: 55
End: 2025-03-01
Payer: COMMERCIAL

## 2025-03-01 VITALS
SYSTOLIC BLOOD PRESSURE: 145 MMHG | OXYGEN SATURATION: 100 % | RESPIRATION RATE: 12 BRPM | TEMPERATURE: 97.7 F | HEART RATE: 73 BPM | DIASTOLIC BLOOD PRESSURE: 97 MMHG

## 2025-03-01 DIAGNOSIS — L50.9 HIVES: Primary | ICD-10-CM

## 2025-03-01 PROCEDURE — 99213 OFFICE O/P EST LOW 20 MIN: CPT | Performed by: FAMILY MEDICINE

## 2025-03-01 RX ORDER — PREDNISONE 5 MG/1
TABLET ORAL
Qty: 50 TABLET | Refills: 0 | Status: SHIPPED | OUTPATIENT
Start: 2025-03-01

## 2025-03-01 NOTE — PATIENT INSTRUCTIONS
Stop Tamiflu.      Prednisone 30, 20, 10, 5. Start today.      Benadryl is OK.      Seek further attention if rash reoccurs.

## 2025-03-01 NOTE — PROGRESS NOTES
(L50.9) Hives  (primary encounter diagnosis)  Comment:     Possible reaction to Tamiflu.  There might be another consideration such as a reaction to allopurinol.  Patient has some as needed prednisone which he uses for his arthritis.    Plan: predniSONE (DELTASONE) 5 MG tablet        Continue Benadryl.  Observe.  Have follow-up if these hives recur.      CHIEF COMPLAINT    Hives, possible allergic reaction.      HISTORY    This patient started having hives last night.  Location was trunk and a few on the face.  The areas were pruritic.    He contacted a nurse line and they suggested Benadryl and observation.    Symptoms have subsided somewhat but he still has a rash and some redness and itching on the face and the ears.    He is not having lip or tongue swelling.  No wheezing or breathing problems.    He has no history of allergies.    He was started on Tamiflu 3 days ago for influenza.      REVIEW OF SYSTEMS    As above.  No fever.  No SOB or wheeze.  No chest pain or palpitations.  No nausea.      EXAM  BP (!) 145/97   Pulse 73   Temp 97.7  F (36.5  C) (Oral)   Resp 12   SpO2 100%     Patient is alert, NAD.  External ears appear bit red.  1 faint hive right cheek.  No stridor.  No cough or labored breathing.  Some fine maculopapular rash of trunk and back.

## 2025-03-12 ENCOUNTER — TELEPHONE (OUTPATIENT)
Dept: FAMILY MEDICINE | Facility: CLINIC | Age: 55
End: 2025-03-12
Payer: COMMERCIAL

## 2025-03-12 DIAGNOSIS — E11.9 TYPE 2 DIABETES MELLITUS WITHOUT COMPLICATION, WITHOUT LONG-TERM CURRENT USE OF INSULIN (H): Primary | ICD-10-CM

## 2025-03-12 NOTE — TELEPHONE ENCOUNTER
Colon cancer screening is negative  You do have evidence of fatty liver.     Fatty liver is when you get fatty infiltration of the liver.  This happens over the course of several years and is a combination weight, lifestyle, dietary choices, whether or not there is presence of diabetes, thyroid disease and heavy alcohol use.  Over time, fatty liver can progress and cause liver cirrhosis and eventually liver failure.  At this time, the most important thing we can do is work on weight loss and managing the diabetes to slow progression of the fatty liver.  Cholesterol lowering med can be helpful for this.  Let me know if you are interested in that.      ----- Message from Judy Dutton sent at 3/12/2025 10:17 AM CDT -----  My[facility] message not read. Please call x 2 and if no response, please send letter.

## 2025-03-12 NOTE — TELEPHONE ENCOUNTER
Left message for patient to call back. When he calls back please relay results below.    Letter has been sent as well

## 2025-03-13 NOTE — TELEPHONE ENCOUNTER
Patient called back.  Relayed test results from CAT scan noted below.  Patient states that he does not want to start a medication at this time.  He prefers to meet with diabetic education on Monday and discuss healthy diet and lifestyle choices that could manage his type 2 diabetes and cholesterol/liver.    Nils Acuna RN     [facility]

## 2025-03-17 ENCOUNTER — VIRTUAL VISIT (OUTPATIENT)
Dept: EDUCATION SERVICES | Facility: CLINIC | Age: 55
End: 2025-03-17
Attending: STUDENT IN AN ORGANIZED HEALTH CARE EDUCATION/TRAINING PROGRAM
Payer: COMMERCIAL

## 2025-03-17 DIAGNOSIS — E11.9 TYPE 2 DIABETES MELLITUS WITHOUT COMPLICATION, WITHOUT LONG-TERM CURRENT USE OF INSULIN (H): ICD-10-CM

## 2025-03-17 PROCEDURE — G0108 DIAB MANAGE TRN  PER INDIV: HCPCS | Mod: 95 | Performed by: DIETITIAN, REGISTERED

## 2025-03-17 NOTE — LETTER
3/17/2025         RE: Rigoberto Laird  [address] [place] [address]        Dear Colleague,    Thank you for referring your patient, Rigoberto Laird, to the [facility] [place]. Please see a copy of my visit note below.    Diabetes Self-Management Education & Support    Presents for: Initial Assessment for new diagnosis    Type of service:  Video Visit    If the video visit is dropped, the video visit invitation should be resent by: Send to e-mail at: [email or web address]    Originating Location (pt. Location): Home  Distant Location (provider location): Offsite  Mode of Communication:  Video Conference via [employer]    Video Start Time:  3:34p  Video End Time (time video stopped): 4:07p    How would patient like to obtain AVS? MyChart      Assessment  Rigoberto is joined by his spouse for new diagnosis education related to recent T2DM diagnosis. He is feeling disappointed but also asks good questions about next step. He is wondering if he should start the metformin and we reviewed pros & cons of this plus testing blood sugar, being vigilant of overall health and next steps. They are agreeable to virtual group classes and we got them scheduled today. He will test blood sugars a few times a week and keep track of results, reach out via [employer] with questions.     Patient's most recent   Lab Results   Component Value Date    A1C 6.5 02/21/2025     is meeting goal of <7.0    Diabetes knowledge and skills assessment:   Patient is knowledgeable in diabetes management concepts related to: none, new diagnosis     Based on learning assessment above, most appropriate setting for further diabetes education would be: Group class setting.    Care Plan and Education Provided:  Monitoring: Frequency of monitoring, Individual glucose targets, and Log and interpret results, Taking Medication: Action of prescribed medication(s), Side effects of prescribed medication(s), and When to take medication(s),  Problem Solving: Low glucose - causes, signs/symptoms, treatment and prevention, and Reducing Risks: Complications of diabetes, Goal for A1c, how it relates to glucose and how often to check, Preventing cardiovascular disease, including blood pressure goals, lipid goals, recommendations for cardioprotective medications, statins, and aspirin, and Prevention, early diagnostic measures and treatment of complications    Patient verbalized understanding of diabetes self-management education concepts discussed, opportunities for ongoing education and support, and recommendations provided today.    Plan    Attend all 3 virtual group classes, consider one on one follow up after course completion  Start checking blood sugars every other day or a few times a week fasting    Topics to cover at upcoming visits: Healthy Eating, Being Active, Monitoring, Taking Medication, Problem Solving, Reducing Risks, and Healthy Coping    Follow-up:  Upcoming Diabetes Ed Appointments     Visit Type Date Time Department    NEW TYPE 2 DIABETES ED 3/17/2025  3:30 PM  DIABETES ED    [facility]OM DIABETES ED GROUP 4/10/2025  2:30 PM BE DIABETES ED        See Care Plan for co-developed, patient-state behavior change goals.    Education Materials Provided:  -  [employer] Understanding Diabetes Booklet   - My Plate Planner   - Blood Glucose Log Sheet   - Goals for Your Diabetes Care   - Types of Diabetes Medicines  - ADCES Diabetes Distress handout      Subjective/Objective  Rigoberto is an 54 year old year old, presenting for the following diabetes education related to: Presents for: Initial Assessment for new diagnosis  Accompanied by: Self, Spouse  Diabetes education in the past 24mo: No  Focus of Visit: Taking Medication  Diabetes type: Type 2  Date of diagnosis: 2/2025  Disease course: Stable  How confident are you filling out medical forms by yourself:: Not Assessed  Diabetes management related comments/concerns: Should I start the medication or  "would it be ok to wait?  Transportation concerns: No  Difficulty affording diabetes medication?: No  Difficulty affording diabetes testing supplies?: No  Other concerns:: None  Cultural Influences/Ethnic Background:  Not  or     Diabetes Symptoms & Complications:  Symptom course: Stable  Disease course: Stable  Complications assessed today?: No    Patient Problem List and Family Medical History reviewed for relevant medical history, current medical status, and diabetes risk factors.    Vitals:  There were no vitals taken for this visit.  Estimated body mass index is 31.45 kg/m  as calculated from the following:    Height as of 2/21/25: 1.765 m (5' 9.5\").    Weight as of 2/21/25: 98 kg (216 lb 1 oz).   Last 3 BP:   BP Readings from Last 3 Encounters:   03/01/25 (!) 145/97   02/26/25 129/88   02/21/25 132/70       History   Smoking Status     Some Days     Types: Cigarettes, Cigarettes     Start date: 1/1/2004   Smokeless Tobacco     Never       Labs:  Lab Results   Component Value Date    A1C 6.5 02/21/2025     Lab Results   Component Value Date     02/21/2025     11/02/2020     Lab Results   Component Value Date     08/16/2019     Direct Measure HDL   Date Value Ref Range Status   08/16/2019 38 (L) >=40 mg/dL Final   ]  GFR Estimate   Date Value Ref Range Status   02/21/2025 >90 >60 mL/min/1.73m2 Final     Comment:     eGFR calculated using 2021 CKD-EPI equation.   11/02/2020 >60 >60 mL/min/1.73m2 Final     GFR Estimate If Black   Date Value Ref Range Status   11/02/2020 >60 >60 mL/min/1.73m2 Final     Lab Results   Component Value Date    CR 0.90 02/21/2025     No results found for: \"MICROALBUMIN\"      Monitoring:  Monitoring Assessed Today: Yes  Blood Glucose Meter: Unknown  Times checking blood sugar at home (number): Never    NOT ASSESSED - has not started testing yet     Taking Medications:  Diabetes Medication(s)       Biguanides       metFORMIN (GLUCOPHAGE XR) 500 MG 24 hr " tablet Take 1 tablet (500 mg) by mouth daily (with dinner).     Patient not taking: Reported on 3/17/2025          Taking Medication Assessed Today: Yes  Current Treatments: None    Taylor Murray RD  Time Spent: 30 minutes  Encounter Type: Individual    Any diabetes medication dose changes were made via the [facility] under the patient's referring provider.

## 2025-03-17 NOTE — PROGRESS NOTES
Diabetes Self-Management Education & Support    Presents for: Initial Assessment for new diagnosis    Type of service:  Video Visit    If the video visit is dropped, the video visit invitation should be resent by: Send to e-mail at: [email or web address]    Originating Location (pt. Location): Home  Distant Location (provider location): Offsite  Mode of Communication:  Video Conference via [employer]train    Video Start Time:  3:34p  Video End Time (time video stopped): 4:07p    How would patient like to obtain AVS? [employer]      Assessment  Rigoberto is joined by his spouse for new diagnosis education related to recent T2DM diagnosis. He is feeling disappointed but also asks good questions about next step. He is wondering if he should start the metformin and we reviewed pros & cons of this plus testing blood sugar, being vigilant of overall health and next steps. They are agreeable to virtual group classes and we got them scheduled today. He will test blood sugars a few times a week and keep track of results, reach out via [employer] with questions.     Patient's most recent   Lab Results   Component Value Date    A1C 6.5 02/21/2025     is meeting goal of <7.0    Diabetes knowledge and skills assessment:   Patient is knowledgeable in diabetes management concepts related to: none, new diagnosis     Based on learning assessment above, most appropriate setting for further diabetes education would be: Group class setting.    Care Plan and Education Provided:  Monitoring: Frequency of monitoring, Individual glucose targets, and Log and interpret results, Taking Medication: Action of prescribed medication(s), Side effects of prescribed medication(s), and When to take medication(s), Problem Solving: Low glucose - causes, signs/symptoms, treatment and prevention, and Reducing Risks: Complications of diabetes, Goal for A1c, how it relates to glucose and how often to check, Preventing cardiovascular disease, including blood pressure  goals, lipid goals, recommendations for cardioprotective medications, statins, and aspirin, and Prevention, early diagnostic measures and treatment of complications    Patient verbalized understanding of diabetes self-management education concepts discussed, opportunities for ongoing education and support, and recommendations provided today.    Plan    Attend all 3 virtual group classes, consider one on one follow up after course completion  Start checking blood sugars every other day or a few times a week fasting    Topics to cover at upcoming visits: Healthy Eating, Being Active, Monitoring, Taking Medication, Problem Solving, Reducing Risks, and Healthy Coping    Follow-up:  Upcoming Diabetes Ed Appointments     Visit Type Date Time Department    NEW TYPE 2 DIABETES ED 3/17/2025  3:30 PM CS DIABETES ED    [facility] DIABETES ED GROUP 4/10/2025  2:30 PM BE DIABETES ED        See Care Plan for co-developed, patient-state behavior change goals.    Education Materials Provided:  -  [employer] Understanding Diabetes Booklet   - My Plate Planner   - Blood Glucose Log Sheet   - Goals for Your Diabetes Care   - Types of Diabetes Medicines  - ADCES Diabetes Distress handout      Subjective/Objective  Rigoberto is an 54 year old year old, presenting for the following diabetes education related to: Presents for: Initial Assessment for new diagnosis  Accompanied by: Self, Spouse  Diabetes education in the past 24mo: No  Focus of Visit: Taking Medication  Diabetes type: Type 2  Date of diagnosis: 2/2025  Disease course: Stable  How confident are you filling out medical forms by yourself:: Not Assessed  Diabetes management related comments/concerns: Should I start the medication or would it be ok to wait?  Transportation concerns: No  Difficulty affording diabetes medication?: No  Difficulty affording diabetes testing supplies?: No  Other concerns:: None  Cultural Influences/Ethnic Background:  Not  or     Diabetes  "Symptoms & Complications:  Symptom course: Stable  Disease course: Stable  Complications assessed today?: No    Patient Problem List and Family Medical History reviewed for relevant medical history, current medical status, and diabetes risk factors.    Vitals:  There were no vitals taken for this visit.  Estimated body mass index is 31.45 kg/m  as calculated from the following:    Height as of 2/21/25: 1.765 m (5' 9.5\").    Weight as of 2/21/25: 98 kg (216 lb 1 oz).   Last 3 BP:   BP Readings from Last 3 Encounters:   03/01/25 (!) 145/97   02/26/25 129/88   02/21/25 132/70       History   Smoking Status    Some Days    Types: Cigarettes, Cigarettes    Start date: 1/1/2004   Smokeless Tobacco    Never       Labs:  Lab Results   Component Value Date    A1C 6.5 02/21/2025     Lab Results   Component Value Date     02/21/2025     11/02/2020     Lab Results   Component Value Date     08/16/2019     Direct Measure HDL   Date Value Ref Range Status   08/16/2019 38 (L) >=40 mg/dL Final   ]  GFR Estimate   Date Value Ref Range Status   02/21/2025 >90 >60 mL/min/1.73m2 Final     Comment:     eGFR calculated using 2021 CKD-EPI equation.   11/02/2020 >60 >60 mL/min/1.73m2 Final     GFR Estimate If Black   Date Value Ref Range Status   11/02/2020 >60 >60 mL/min/1.73m2 Final     Lab Results   Component Value Date    CR 0.90 02/21/2025     No results found for: \"MICROALBUMIN\"      Monitoring:  Monitoring Assessed Today: Yes  Blood Glucose Meter: Unknown  Times checking blood sugar at home (number): Never    NOT ASSESSED - has not started testing yet     Taking Medications:  Diabetes Medication(s)       Biguanides       metFORMIN (GLUCOPHAGE XR) 500 MG 24 hr tablet Take 1 tablet (500 mg) by mouth daily (with dinner).     Patient not taking: Reported on 3/17/2025          Taking Medication Assessed Today: Yes  Current Treatments: None    Taylor Murray RD  Time Spent: 30 minutes  Encounter Type: Individual    Any " diabetes medication dose changes were made via the [facility] Standing Orders under the patient's referring provider.

## 2025-03-17 NOTE — PATIENT INSTRUCTIONS
You are signed up for upcoming virtual type 2 classes. Class topics and dates are listed below:     Healthy Eating & Being Active - 4/10 at 2:30p  Problem Solving, Reducing Risks & Healthy Coping -  at 2:30p  Monitoring & Medication -  at 2:30p    You will find the classes listed on your appointment schedule on [employer]. On the day of your class, you will enter class via [employer]Fire and will be taken to the e[employer] platform.     Please try to log in ~10 minutes before the class start to ensure you have appropriate software or in case you have any issues.      Call or send a [employer]Fire message with any questions or concerns    Taylor Murray RD, LD, [facility]   Diabetes Education Triage Line: [phone]  Diabetes Education Appointment [phone]

## 2025-04-10 ENCOUNTER — VIRTUAL VISIT (OUTPATIENT)
Dept: EDUCATION SERVICES | Facility: CLINIC | Age: 55
End: 2025-04-10
Payer: COMMERCIAL

## 2025-04-10 DIAGNOSIS — E11.9 TYPE 2 DIABETES MELLITUS WITHOUT COMPLICATION, WITHOUT LONG-TERM CURRENT USE OF INSULIN (H): Primary | ICD-10-CM

## 2025-04-10 NOTE — LETTER
4/10/2025         RE: Rigoberto Laird  [address] [place] [address]        Dear Colleague,    Thank you for referring your patient, Rigoberto Laird, to the [facility]. Please see a copy of my visit note below.    Diabetes Self-Management Training Virtual  Class - Healthy Eating & Being Active    Type of service:  Video Visit    Video Start Time:  2:30 PM  Video End Time (time video stopped): 3:30 PM    Rigoberto Laird presents today for group education related to Type 2 diabetes     Educational Topics Discussed  Pathophysiology of Type 2 Diabetes, A1C Value, Carbohydrate Counting, Label Reading, Plate Planner Approach, Carb Counting Practice, Heart Healthy diet, Exercise - why it is helpful for BG control    Quick Review of other class topics - Monitoring & Diabetes Treatment Options, Hyperglycemia & Hypoglycemia Prevention and Symptoms, Risk Reduction, Sleep, Diabetes Distress    Materials Provided  Understanding Diabetes  Blood sugar log sheet  Goals of Diabetes Care  MyPlate Planner  Medications for Diabetes  ADCES Diabetes Distress    All questions were answered in the group setting. Patient to contact educator with specific questions, should need arise.    Plan and Follow-up  Patient to begin carb counting and follow recommended meal plan.  Patient to attend all Diabetes Self-Management Training Classes.  Patient to follow-up with diabetes educator one-on-one, as needed, after classes completed.     Patient was encouraged to create a patient-stated goal and share with class instructor via class chat or Mychart.     Time Spent: 60 minutes

## 2025-04-10 NOTE — PROGRESS NOTES
Diabetes Self-Management Training Virtual  Class - Healthy Eating & Being Active    Type of service:  Video Visit    Video Start Time:  2:30 PM  Video End Time (time video stopped): 3:30 PM    Rigoberto Laird presents today for group education related to Type 2 diabetes     Educational Topics Discussed  Pathophysiology of Type 2 Diabetes, A1C Value, Carbohydrate Counting, Label Reading, Plate Planner Approach, Carb Counting Practice, Heart Healthy diet, Exercise - why it is helpful for BG control    Quick Review of other class topics - Monitoring & Diabetes Treatment Options, Hyperglycemia & Hypoglycemia Prevention and Symptoms, Risk Reduction, Sleep, Diabetes Distress    Materials Provided  Understanding Diabetes  Blood sugar log sheet  Goals of Diabetes Care  MyPlate Planner  Medications for Diabetes  ADCES Diabetes Distress    All questions were answered in the group setting. Patient to contact educator with specific questions, should need arise.    Plan and Follow-up  Patient to begin carb counting and follow recommended meal plan.  Patient to attend all Diabetes Self-Management Training Classes.  Patient to follow-up with diabetes educator one-on-one, as needed, after classes completed.     Patient was encouraged to create a patient-stated goal and share with class instructor via class chat or Mychart.     Time Spent: 60 minutes

## 2025-04-23 ENCOUNTER — MYC MEDICAL ADVICE (OUTPATIENT)
Dept: FAMILY MEDICINE | Facility: CLINIC | Age: 55
End: 2025-04-23
Payer: COMMERCIAL

## 2025-04-23 DIAGNOSIS — M19.90 INFLAMMATORY ARTHRITIS: Primary | ICD-10-CM

## 2025-04-24 ENCOUNTER — VIRTUAL VISIT (OUTPATIENT)
Dept: EDUCATION SERVICES | Facility: CLINIC | Age: 55
End: 2025-04-24
Payer: COMMERCIAL

## 2025-04-24 DIAGNOSIS — E11.9 TYPE 2 DIABETES MELLITUS WITHOUT COMPLICATION, WITHOUT LONG-TERM CURRENT USE OF INSULIN (H): Primary | ICD-10-CM

## 2025-04-24 NOTE — TELEPHONE ENCOUNTER
"Patient states they need an \"insurance referral\"     Will send to referral team to see if they can assist   "

## 2025-04-24 NOTE — LETTER
2025         RE: Rigoberto Laird  [address] [place] [address]        Dear Colleague,    Thank you for referring your patient, Rigoberto Laird, to the [facility]. Please see a copy of my visit note below.    Diabetes Self-Management Training Class Virtual - Problem Solving, Reducing Risks, & Healthy Coping     Type of service:  Video Visit     Video Start Time: 2:30pm  Video End Time (time video stopped): 3:20pm     Patient presents today for group education related to Type 2 diabetes      Educational Topics Discussed  Pathophysiology of Type 2 Diabetes, A1C Value, Prevention and treatment of hypoglycemia & hyperglycemia, Sick Day Management, Complications of Diabetes - retinopathy, nephropathy, neuropathy, and CV risk, Diabetes Care Goals, Healthy Coping, Diabetes Distress, when to seek professional help, Adequate Sleep      Quick Review of other class topics - Healthy Eating, Being Active, Monitoring & Taking Medications     Materials Provided  Understanding Diabetes  Blood sugar log sheet  Goals of Diabetes Care  MyPlate Planner  Medications for Diabetes  ADCES Diabetes Distress     All questions were answered in the group setting. Patient to contact educator with specific questions, should need arise.     Plan and Follow-up  Patient to begin testing blood sugars at least once daily, via glucometer or CGM.   Patient to attend all Diabetes Self-Management Training Classes.  Patient to follow-up with diabetes educator one-on-one, as needed, after classes completed.      Patient was encouraged to create a patient-stated goal and share with class instructor via class chat or Mychart.      Gely Tirado, MATTIE, RDN, LD, CDCES  Diabetes      [phone]  Diabetes Education Care Team: [phone]     Time Spent: 50 minutes

## 2025-04-24 NOTE — PROGRESS NOTES
Diabetes Self-Management Training Class Virtual - Problem Solving, Reducing Risks, & Healthy Coping     Type of service:  Video Visit     Video Start Time: 2:30pm  Video End Time (time video stopped): 3:20pm     Patient presents today for group education related to Type 2 diabetes      Educational Topics Discussed  Pathophysiology of Type 2 Diabetes, A1C Value, Prevention and treatment of hypoglycemia & hyperglycemia, Sick Day Management, Complications of Diabetes - retinopathy, nephropathy, neuropathy, and CV risk, Diabetes Care Goals, Healthy Coping, Diabetes Distress, when to seek professional help, Adequate Sleep      Quick Review of other class topics - Healthy Eating, Being Active, Monitoring & Taking Medications     Materials Provided  Understanding Diabetes  Blood sugar log sheet  Goals of Diabetes Care  MyPlate Planner  Medications for Diabetes  ADCES Diabetes Distress     All questions were answered in the group setting. Patient to contact educator with specific questions, should need arise.     Plan and Follow-up  Patient to begin testing blood sugars at least once daily, via glucometer or CGM.   Patient to attend all Diabetes Self-Management Training Classes.  Patient to follow-up with diabetes educator one-on-one, as needed, after classes completed.      Patient was encouraged to create a patient-stated goal and share with class instructor via class chat or Mychart.      Gely Tirado, MATTIE, RDN, LD, CDCES  Diabetes      [phone]  Diabetes Education Care Team: [phone]     Time Spent: 50 minutes

## 2025-04-24 NOTE — TELEPHONE ENCOUNTER
Referral hai'd up but ICD-10 code M10.00 does not populate as gout arthritis so not sure what to choose for that

## 2025-04-24 NOTE — PATIENT INSTRUCTIONS
Thank you for attending the Problem Solving, Reducing Risks & Healthy Coping Class!      -Here are some things that were discussed:       1) Maintain routine follow up with your primary care provider    Routine   -Hemoglobin A1C (every 3-6 months)   -Blood Pressure Check (every visit)     Yearly   -Eye Exam   -Foot Exam   -Dental Exam (every 6 months)   -Cholesterol lab   -Kidney labs        2) What to do if:   -Blood sugars over goals consistently, schedule a visit with your doctor or diabetes educator   -You have 2 to 3 low blood sugars in one week (less than 70 mg/dL), call your doctor or diabetes educator   -You are ill and readings are greater than 240 mg/dL for more than 24 hours, call your doctor     3) If feeling overwhelmed with your new diagnosis of diabetes please reach out to your provider to discuss next steps.       4) Create a SMART goal and send your goal to an instructor through [employer]       5) Follow-up Plan       -Attend all 3 new type 2 diabetes classes     -Consider a one-on-one appointment with a diabetes educator if you have more questions or concerns     -At minimum, meet with the diabetes educator yearly, sooner as needed         The Diabetes Care Team    Adult Diabetes Education Triage #[phone]    Diabetes Education Scheduling #[phone]

## 2025-05-08 ENCOUNTER — VIRTUAL VISIT (OUTPATIENT)
Dept: EDUCATION SERVICES | Facility: CLINIC | Age: 55
End: 2025-05-08
Payer: COMMERCIAL

## 2025-05-08 DIAGNOSIS — E11.9 TYPE 2 DIABETES MELLITUS WITHOUT COMPLICATION, WITHOUT LONG-TERM CURRENT USE OF INSULIN (H): Primary | ICD-10-CM

## 2025-05-08 NOTE — LETTER
5/8/2025         RE: Rigoberto Laird  [address] [place] [address]        Dear Colleague,    Thank you for referring your patient, Rigoberto Laird, to the [facility]. Please see a copy of my visit note below.    Diabetes Self-Management Training Virtual Class - Monitoring & Taking Medication    Type of service:  Video Visit    Video Start Time: 2:20 PM  Video End Time (time video stopped): 3:25 PM    Rigoberto Laird presents today for group education related to Type 2 diabetes     Educational Topics Discussed  Pathophysiology of Type 2 Diabetes, A1C Value, Monitoring - what affects blood sugars, Glucometer & CGM use, video glucometer training, blood sugar goals, testing plans, Medications - first, second & third line therapies, insulin use, choosing the right medicine    Quick Review of other class topics - Healthy Eating, Being Active, Hyperglycemia & Hypoglycemia Prevention and Symptoms, Risk Reduction, Sleep, Diabetes Distress    Materials Provided  Understanding Diabetes  Blood sugar log sheet  Goals of Diabetes Care  MyPlate Planner  Medications for Diabetes  ADCES Diabetes Distress    All questions were answered in the group setting. Patient to contact educator with specific questions, should need arise.    Plan and Follow-up  Patient to begin testing blood sugars at least once daily, via glucometer or CGM.   Patient to attend all Diabetes Self-Management Training Classes.  Patient to follow-up with diabetes educator one-on-one, as needed, after classes completed.     Patient was encouraged to create a patient-stated goal and share with class instructor via class chat or Mychart.       Time Spent: 55 minutes

## 2025-05-08 NOTE — PROGRESS NOTES
Diabetes Self-Management Training Virtual Class - Monitoring & Taking Medication    Type of service:  Video Visit    Video Start Time: 2:20 PM  Video End Time (time video stopped): 3:25 PM    Rigoberto Laird presents today for group education related to Type 2 diabetes     Educational Topics Discussed  Pathophysiology of Type 2 Diabetes, A1C Value, Monitoring - what affects blood sugars, Glucometer & CGM use, video glucometer training, blood sugar goals, testing plans, Medications - first, second & third line therapies, insulin use, choosing the right medicine    Quick Review of other class topics - Healthy Eating, Being Active, Hyperglycemia & Hypoglycemia Prevention and Symptoms, Risk Reduction, Sleep, Diabetes Distress    Materials Provided  Understanding Diabetes  Blood sugar log sheet  Goals of Diabetes Care  MyPlate Planner  Medications for Diabetes  ADCES Diabetes Distress    All questions were answered in the group setting. Patient to contact educator with specific questions, should need arise.    Plan and Follow-up  Patient to begin testing blood sugars at least once daily, via glucometer or CGM.   Patient to attend all Diabetes Self-Management Training Classes.  Patient to follow-up with diabetes educator one-on-one, as needed, after classes completed.     Patient was encouraged to create a patient-stated goal and share with class instructor via class chat or Mychart.       Time Spent: 55 minutes

## 2025-05-08 NOTE — PATIENT INSTRUCTIONS
Thank you for attending the Monitoring & Medications Class, here are some things were discussed:    1) Check your blood sugar daily (fasting, 2 hours after eating, before bedtime) or utilize continuous glucose monitor (Kevin/Dexcom and look at readings multiple times/day).    -Your blood sugar goals:  mg/dL before eating and  mg/dL 2 hours after eating (or per your doctor).  -With continuous glucose monitor : Time In Range >70%     2) What to do if:   -Blood sugars over goals consistently, schedule a visit with your doctor or diabetes educator   -You have 2 to 3 low blood sugars in one week (less than 70 mg/dL), call your doctor or diabetes educator   -You are ill and readings are greater than 240 mg/dL for more than 24 hours, call your doctor     3) If you would like to discuss your own medication regimen in more detail, schedule a one-on-one appt after you ve completed the class series     4) Create a SMART goal and send your goal to an instructor through [employer]    5) Follow-up Plan  -Attend all 3 new type 2 diabetes classes   -Consider a one-on-one appointment with a diabetes educator if you have more questions or concerns   -At minimum, meet with the diabetes educator yearly, sooner as needed      The Diabetes Care Team  Adult Diabetes Education Triage #[phone]  Diabetes Education Scheduling #[phone]

## 2025-05-13 ENCOUNTER — OFFICE VISIT (OUTPATIENT)
Dept: FAMILY MEDICINE | Facility: CLINIC | Age: 55
End: 2025-05-13
Payer: COMMERCIAL

## 2025-05-13 VITALS
TEMPERATURE: 98.3 F | HEIGHT: 69 IN | HEART RATE: 72 BPM | RESPIRATION RATE: 14 BRPM | SYSTOLIC BLOOD PRESSURE: 132 MMHG | OXYGEN SATURATION: 98 % | BODY MASS INDEX: 32.35 KG/M2 | WEIGHT: 218.4 LBS | DIASTOLIC BLOOD PRESSURE: 76 MMHG

## 2025-05-13 DIAGNOSIS — D64.9 LOW HEMOGLOBIN: Primary | ICD-10-CM

## 2025-05-13 DIAGNOSIS — E11.9 TYPE 2 DIABETES MELLITUS WITHOUT COMPLICATION, WITHOUT LONG-TERM CURRENT USE OF INSULIN (H): ICD-10-CM

## 2025-05-13 DIAGNOSIS — K76.0 HEPATIC STEATOSIS: ICD-10-CM

## 2025-05-13 LAB
HGB BLD-MCNC: 12.3 G/DL (ref 13.3–17.7)
HOLD SPECIMEN: NORMAL
IRON BINDING CAPACITY (ROCHE): 524 UG/DL (ref 240–430)
IRON SATN MFR SERPL: 7 % (ref 15–46)
IRON SERPL-MCNC: 36 UG/DL (ref 61–157)

## 2025-05-13 PROCEDURE — 83550 IRON BINDING TEST: CPT

## 2025-05-13 PROCEDURE — G2211 COMPLEX E/M VISIT ADD ON: HCPCS

## 2025-05-13 PROCEDURE — 99215 OFFICE O/P EST HI 40 MIN: CPT

## 2025-05-13 PROCEDURE — 82043 UR ALBUMIN QUANTITATIVE: CPT

## 2025-05-13 PROCEDURE — 82728 ASSAY OF FERRITIN: CPT

## 2025-05-13 PROCEDURE — 82570 ASSAY OF URINE CREATININE: CPT

## 2025-05-13 PROCEDURE — 85018 HEMOGLOBIN: CPT

## 2025-05-13 PROCEDURE — 83540 ASSAY OF IRON: CPT

## 2025-05-13 PROCEDURE — 36415 COLL VENOUS BLD VENIPUNCTURE: CPT

## 2025-05-13 RX ORDER — BLOOD-GLUCOSE METER
EACH MISCELLANEOUS
COMMUNITY
Start: 2025-02-26

## 2025-05-13 NOTE — PATIENT INSTRUCTIONS
Thank you for choosing the [facility], it was a pleasure to see you today!    Please take a look at the information below for more specific details regarding the treatment plan and recommendations.    -In this after visit summary is a list of your medications and specific instructions.  Please review this carefully as there may be changes made to your medication list.  -If there are any particular questions or concerns, please feel free to reach out.  -If any labs have been completed, we will reach out to you about results.  If the results are normal or not concerning, a letter or [employer] message will be sent to you.  If any follow-up is needed, either RJ or the nurse will give you a call.  If you have not heard regarding results after 2 weeks, please reach out to the clinic.           Please seek immediate medical attention (go to the emergency room or urgent care) for the following reasons: worsening symptoms or any concerning changes.      --------------------------------------------------------------------------------------------------------------------    -We are always looking for ways to improve.  You may be selected to receive a survey regarding your visit today.  We encourage you to complete the survey and provide specific, constructive feedback to help us improve our processes. If you answer the survey/phone call this will also prompt them to STOP calling you, otherwise they will try again each day for several day! Thank you for your time!    -Please review the contact information listed on the after visit summary and in the electronic chart.  Below is the phone number that we have on file.  If there are any changes that are needed to be made, please reach out to the clinic.  [phone] (home) [phone] (work)

## 2025-05-13 NOTE — PROGRESS NOTES
Assessment & Plan    Low hemoglobin:  - Differentials considered include low dietary iron or a slow gastrointestinal bleed, possibly related to hemorrhoids. These were considered due to the patient's history of anemia and recent hemoglobin levels.  - Hemoglobin level at 12.1 at previous check. The chosen diagnosis is supported by the patient's lab results and symptoms.  - Plan to check hemoglobin with reflex to iron studies. If iron levels are low, consider starting an iron supplement. Monitor hemorrhoids and address during upcoming colonoscopy (next year) if necessary.  - Would consider further investigation if hemoglobin levels continue to drop or if symptoms worsen.    - Hemoglobin with Reflex to Iron Studies  - Hemoglobin with Reflex to Iron Studies  - Iron & Iron Binding Capacity  - Ferritin    Hepatic steatosis:  - Differentials considered include other liver conditions, but early-stage hepatic steatosis was identified on the CT scan.  - The chosen diagnosis is supported by the CT scan results and the FIB-4 score of 1.91  - Focus on diet and lifestyle modifications. Follow up with primary care provider. Consider further investigation, such as a liver biopsy, if symptoms worsen.  - Would consider referral to a gastroenterologist if symptoms do not improve with lifestyle changes.    Type 2 diabetes mellitus without complication, without long-term current use of insulin (H):  - Chronic with recent diagnosis after A1c of 6.5. Has not yet started metformin and has instead opted to work on diet and lifestyle and see if this helps control levels.  - Is due for urine microalbumin check.    - Albumin Random Urine Quantitative with Creat Ratio  - Albumin Random Urine Quantitative with Creat Ratio      Please seek immediate medical attention (go to the emergency room or urgent care) if symptoms worser or for concerning changes.    Follow-up with PCP for ongoing management, if symptoms do not improve as anticipated, as  "needed for acute concern, and May schedule follow-up with me for low hemoglobin,  if unable to see PCP due to scheduling availability     ---------------------------------------------------------------------------------------   Subjective   Rigoberto is a 54 year old year old male, presenting for the following health issues:  Chief Complaint   Patient presents with    Anemia         5/13/2025     9:28 AM   Additional Questions   Roomed by trae CARLTON  Rigoberto Laird, a 54-year-old male, is here for follow-up on recent lab results. He reports being informed about hepatic stenosis, identified as an early stage of fibrocystic liver disease, from a recent CT scan. He occasionally experiences discomfort in the abdominal region but not frequently. Rigoberto has been managing his diabetes with lifestyle and dietary changes, maintaining an A1c of 6.5. He received metformin in the mail but has not started it, preferring to focus on non-pharmacological management unless necessary. He recently completed training on clean carbs and dietary management.    Rigoberto also reports a history of anemia, with a hemoglobin level of 12.1, which is slightly lower than previous levels. He recalls a hemoglobin level of 12.5 on February 21, 2025, which has since decreased. He has experienced hemorrhoids, which have been more bothersome following a motorcycle ride two weeks ago, causing occasional bleeding. He notes that the bleeding is not frequent, occurring about once a month, and typically resolves with over-the-counter treatments like Preparation H. He has not observed significant blood in his stools recently.    ---------------------------------------------------------------------------------------   Objective    Vital signs: /76   Pulse 72   Temp 98.3  F (36.8  C) (Oral)   Resp 14   Ht 1.76 m (5' 9.29\")   Wt 99.1 kg (218 lb 6.4 oz)   SpO2 98%   BMI 31.98 kg/m      Body mass index is 31.98 kg/m .    Physical Exam    General " appearance: alert, well appearing, and in no distress  Mental status: alert, oriented to person, place, and time  Chest: clear to auscultation, no wheezes, rales or rhonchi, symmetric air entry'  Heart: normal rate, regular rhythm, normal S1, S2, no murmurs, rubs, clicks or gallops  Abdomen: Bowel sounds in all 4 quadrants. Soft, nontender, nondistended, no masses or organomegaly     Results for orders placed or performed in visit on 05/13/25   Hemoglobin with Reflex to Iron Studies     Status: Abnormal   Result Value Ref Range    Hemoglobin 12.3 (L) 13.3 - 17.7 g/dL   Hemoglobin with Reflex to Iron Studies     Status: Abnormal (In process)    Narrative    The following orders were created for panel order Hemoglobin with Reflex to Iron Studies.  Procedure                               Abnormality         Status                     ---------                               -----------         ------                     Hemoglobin with Reflex ...[[id number]]  Abnormal            Final result               Extra Green Top (Lithiu...[[id number]]                      In process                   Please view results for these tests on the individual orders.     Results for orders placed or performed in visit on 05/13/25 (from the past 24 hours)   Hemoglobin with Reflex to Iron Studies    Narrative    The following orders were created for panel order Hemoglobin with Reflex to Iron Studies.  Procedure                               Abnormality         Status                     ---------                               -----------         ------                     Hemoglobin with Reflex ...[[id number]]  Abnormal            Final result               Extra Green Top (Lithiu...[[id number]]                      In process                   Please view results for these tests on the individual orders.   Hemoglobin with Reflex to Iron Studies   Result Value Ref Range    Hemoglobin 12.3 (L) 13.3 - 17.7 g/dL        ---------------------------------------------------------------------------------------   Amount of time spent in chart review, direct patient contact, care coordination, and related activities to patient care on the day of appointment: 40 minutes.      Options for treatment and follow-up care were reviewed with the patient. Rigoberto Laird and/or guardian was engaged and actively involved in the decision making process. Rigoberto Laird and/or guardian verbalized understanding of the options discussed and was satisfied with the final plan.    The longitudinal plan of care for the diagnosis(es)/condition(s) as documented were addressed during this visit. Due to the added complexity in care, I can continue to support Rigoberto in the subsequent management and with ongoing continuity of care related to these items if unable to see (or establish with) PCP.       Patient consented to use of ambient AI scribe prior to initiation of visit.    Signed Electronically by: GILMA Rosenbaum CNP

## 2025-05-14 LAB
CREAT UR-MCNC: 141 MG/DL
FERRITIN SERPL-MCNC: 16 NG/ML (ref 31–409)
MICROALBUMIN UR-MCNC: <12 MG/L
MICROALBUMIN/CREAT UR: NORMAL MG/G{CREAT}

## 2025-05-15 ENCOUNTER — RESULTS FOLLOW-UP (OUTPATIENT)
Dept: FAMILY MEDICINE | Facility: CLINIC | Age: 55
End: 2025-05-15
Payer: COMMERCIAL

## 2025-05-15 DIAGNOSIS — D50.9 IRON DEFICIENCY ANEMIA, UNSPECIFIED IRON DEFICIENCY ANEMIA TYPE: Primary | ICD-10-CM

## 2025-05-15 RX ORDER — FERROUS SULFATE 325(65) MG
325 TABLET ORAL
Qty: 90 TABLET | Refills: 0 | Status: SHIPPED | OUTPATIENT
Start: 2025-05-15

## 2025-05-15 NOTE — RESULT ENCOUNTER NOTE
Anna Marie Franklin    I hope you are doing well.  The labs results from your visit with me in clinic in and show iron deficiency anemia.     For now lets start an iron supplement. You should take this pill once daily, preferably on an empty stomach. Iron can be a little upsetting to the stomach, though so if this is the case, you can take it with food. Follow-up labs should be in 3-6 months to ensure this is treating the low hemoglobin (anemia). Sooner if hemorrhoids are giving issues or if you have increased weakness or fatigue     Let me know if you have any questions.     Thank you,      GILMA [place]

## 2025-05-22 ENCOUNTER — OFFICE VISIT (OUTPATIENT)
Dept: FAMILY MEDICINE | Facility: CLINIC | Age: 55
End: 2025-05-22
Payer: COMMERCIAL

## 2025-05-22 ENCOUNTER — NURSE TRIAGE (OUTPATIENT)
Dept: FAMILY MEDICINE | Facility: CLINIC | Age: 55
End: 2025-05-22

## 2025-05-22 VITALS
TEMPERATURE: 98.1 F | WEIGHT: 220 LBS | OXYGEN SATURATION: 97 % | SYSTOLIC BLOOD PRESSURE: 137 MMHG | BODY MASS INDEX: 32.58 KG/M2 | RESPIRATION RATE: 16 BRPM | DIASTOLIC BLOOD PRESSURE: 84 MMHG | HEART RATE: 84 BPM | HEIGHT: 69 IN

## 2025-05-22 DIAGNOSIS — Z86.0100 HISTORY OF COLONIC POLYPS: ICD-10-CM

## 2025-05-22 DIAGNOSIS — K64.8 BLEEDING INTERNAL HEMORRHOIDS: Primary | ICD-10-CM

## 2025-05-22 LAB
ERYTHROCYTE [DISTWIDTH] IN BLOOD BY AUTOMATED COUNT: 16.6 % (ref 10–15)
EST. AVERAGE GLUCOSE BLD GHB EST-MCNC: 123 MG/DL
HBA1C MFR BLD: 5.9 % (ref 0–5.6)
HCT VFR BLD AUTO: 39.7 % (ref 40–53)
HGB BLD-MCNC: 12.5 G/DL (ref 13.3–17.7)
HOLD SPECIMEN: NORMAL
IRON BINDING CAPACITY (ROCHE): 476 UG/DL (ref 240–430)
IRON SATN MFR SERPL: 74 % (ref 15–46)
IRON SERPL-MCNC: 352 UG/DL (ref 61–157)
MCH RBC QN AUTO: 22.6 PG (ref 26.5–33)
MCHC RBC AUTO-ENTMCNC: 31.5 G/DL (ref 31.5–36.5)
MCV RBC AUTO: 72 FL (ref 78–100)
PLATELET # BLD AUTO: 232 10E3/UL (ref 150–450)
RBC # BLD AUTO: 5.53 10E6/UL (ref 4.4–5.9)
WBC # BLD AUTO: 7.8 10E3/UL (ref 4–11)

## 2025-05-22 NOTE — TELEPHONE ENCOUNTER
"Reason for Disposition    Rectal bleeding, bloody stools, or blood in stool (bowel movement)    Additional Information    Negative: Passed out (e.g., fainted, lost consciousness, blacked out and was not responding)    Negative: Shock suspected (e.g., cold/pale/clammy skin, too weak to stand, low BP, rapid pulse)    Negative: Vomiting red blood or black (coffee ground) material    Negative: Sounds like a life-threatening emergency to the triager    Negative: Diarrhea is main symptom    Negative: Rectal symptoms    Negative: SEVERE abdominal pain (e.g., excruciating)    Negative: Constant abdominal pain lasting > 2 hours    Negative: Pale skin (pallor) of new-onset or getting worse    Negative: Patient sounds very sick or weak to the triager    Negative: Patient sounds very sick or weak to the triager    Answer Assessment - Initial Assessment Questions  1. APPEARANCE of BLOOD: \"What color is it?\" \"Is it passed separately, on the surface of the stool, or mixed in with the stool?\"       Mucusy, bright red    2. AMOUNT: \"How much blood was passed?\"       Streak like blood on outside of stool.  Patient report bad hemorrhoid. Preparation H used but not effective.    3. FREQUENCY: \"How many times has blood been passed with the stools?\"       Off and on for the past 2-3 weeks.    4. ONSET: \"When was the blood first seen in the stools?\" (Days or weeks)       2-3 weeks.     5. DIARRHEA: \"Is there also some diarrhea?\" If Yes, ask: \"How many diarrhea stools in the past 24 hours?\"       Denied.    6. CONSTIPATION: \"Do you have constipation?\" If Yes, ask: \"How bad is it?\"      Denied.  Soft bowel movement each occurrence.    7. RECURRENT SYMPTOMS: \"Have you had blood in your stools before?\" If Yes, ask: \"When was the last time?\" and \"What happened that time?\"       Recurrent symptom of blood in stool but worsening in the last 3 weeks.    8. BLOOD THINNERS: \"Do you take any blood thinners?\" (e.g., Coumadin/warfarin, " "Pradaxa/dabigatran, aspirin)      Denied.    9. OTHER SYMPTOMS: \"Do you have any other symptoms?\"  (e.g., abdomen pain, vomiting, dizziness, fever)      Abdominal pain.  Patient reported feeling fatigue and dizziness at times.    10. PREGNANCY: \"Is there any chance you are pregnant?\" \"When was your last menstrual period?\"        N/a    Protocols used: Stools - Unusual Color-A-OH, Rectal Bleeding-A-OH    "

## 2025-05-22 NOTE — PROGRESS NOTES
Assessment & Plan    Bleeding internal hemorrhoids  History of colonic polyps  - Differentials considered include anal fissures and colorectal cancer, but these are less likely due to the presence of internal hemorrhoids and the patient's history of polyps.  - Internal hemorrhoids are causing GI bleeding, supported by the patient's report of blood in the stool and the history of hemorrhoids.  - Given that colonoscopy would be due in less than 1 year opted for colonoscopy. colonoscopy scheduled as urgent within 3 to 5 days. The patient is advised to avoid NSAIDs like ibuprofen due to the upcoming colonoscopy.  - Would consider further evaluation and alternative treatments if the bleeding does not improve as expected.    - Colonoscopy Screening  Referral  - CBC with Platelets and Reflex to Iron Studies  - CBC with Platelets and Reflex to Iron Studies  - Iron & Iron Binding Capacity  - Ferritin      Please seek immediate medical attention (go to the emergency room or urgent care) if symptoms worser or for concerning changes.    Follow-up with PCP for annual visit, as needed for acute concern, and May schedule follow-up with me for rectal bleeding, hemorrhoids, tests completed today, colonoscopy results if unable to see PCP due to scheduling availability     ---------------------------------------------------------------------------------------   Kamila   Rigoberto is a 54 year old year old male, presenting for the following health issues:  Chief Complaint   Patient presents with    Rectal Problem     Bleeding has gotten worse            5/22/2025     3:13 PM   Additional Questions   Roomed by Lary Franklin KATIE Laird, a 54-year-old male, reported worsening hemorrhoid pain, which has become severe enough that he cannot sit for more than five minutes. He has noticed more blood in his stool, with the stool appearing black on Monday and green today, which he attributes to his medication. He confirmed seeing  "noticeable blood in his stool. He has internal hemorrhoids, as he can feel them internally. Rigoberto has a history of polyps, with his last colonoscopy revealing polyps, and he is almost due for another colonoscopy, with his last one being almost five years ago. He has been using ice for comfort, applying it for 15-20 minutes, and has used two tubes of Preparation H in the last three weeks, though it no longer provides much relief. He was advised not to take ibuprofen and has been taking Tylenol for pain management. Rigoberto also mentioned feeling weaker, having to go home to take a nap, and falling asleep during a meeting at work. He has been taking an iron supplement.        Patient submitted visit information  Answers submitted by the patient for this visit:  Provider Visit on 5/13/2025  9:45 AM with Tate Miles  General Questionnaire (Submitted on 5/9/2025)  Chief Complaint: Chronic problems general questions HPI Form  What is the reason for your visit today? : Fatty liver and low hemoglobin.  How many servings of fruits and vegetables do you eat daily?: 2-3  On average, how many sweetened beverages do you drink each day (Examples: soda, juice, sweet tea, etc.  Do NOT count diet or artificially sweetened beverages)?: 2  How many minutes a day do you exercise enough to make your heart beat faster?: 9 or less  How many days a week do you exercise enough to make your heart beat faster?: 3 or less  How many days per week do you miss taking your medication?: 0  ---------------------------------------------------------------------------------------   Objective    Vital signs: /84   Pulse 84   Temp 98.1  F (36.7  C) (Oral)   Resp 16   Ht 1.76 m (5' 9.29\")   Wt 99.8 kg (220 lb)   SpO2 97%   BMI 32.22 kg/m      Body mass index is 32.22 kg/m .    Physical Exam    General appearance: alert, well appearing, and in no distress  Mental status: alert, oriented to person, place, and time  Extremities: peripheral " pulses normal, no pedal edema, no clubbing or cyanosis  Skin: normal coloration and turgor, no rashes, no suspicious skin lesions noted     Results for orders placed or performed in visit on 05/22/25   **Hemoglobin A1c FUTURE 3mo     Status: Abnormal   Result Value Ref Range    Estimated Average Glucose 123 (H) <117 mg/dL    Hemoglobin A1C 5.9 (H) 0.0 - 5.6 %   CBC with Platelets and Reflex to Iron Studies     Status: Abnormal   Result Value Ref Range    WBC Count 7.8 4.0 - 11.0 10e3/uL    RBC Count 5.53 4.40 - 5.90 10e6/uL    Hemoglobin 12.5 (L) 13.3 - 17.7 g/dL    Hematocrit 39.7 (L) 40.0 - 53.0 %    MCV 72 (L) 78 - 100 fL    MCH 22.6 (L) 26.5 - 33.0 pg    MCHC 31.5 31.5 - 36.5 g/dL    RDW 16.6 (H) 10.0 - 15.0 %    Platelet Count 232 150 - 450 10e3/uL   CBC with Platelets and Reflex to Iron Studies     Status: Abnormal (In process)    Narrative    The following orders were created for panel order CBC with Platelets and Reflex to Iron Studies.  Procedure                               Abnormality         Status                     ---------                               -----------         ------                     CBC with Platelets and ...[[id number]]  Abnormal            Final result               Extra Green Top (Lithiu...[[id number]]                      In process                   Please view results for these tests on the individual orders.     Results for orders placed or performed in visit on 05/22/25 (from the past 24 hours)   **Hemoglobin A1c FUTURE 3mo   Result Value Ref Range    Estimated Average Glucose 123 (H) <117 mg/dL    Hemoglobin A1C 5.9 (H) 0.0 - 5.6 %   CBC with Platelets and Reflex to Iron Studies    Narrative    The following orders were created for panel order CBC with Platelets and Reflex to Iron Studies.  Procedure                               Abnormality         Status                     ---------                               -----------         ------                     CBC with  Platelets and ...[[id number]]  Abnormal            Final result               Extra Green Top (Lithiu...[[id number]]                      In process                   Please view results for these tests on the individual orders.   CBC with Platelets and Reflex to Iron Studies   Result Value Ref Range    WBC Count 7.8 4.0 - 11.0 10e3/uL    RBC Count 5.53 4.40 - 5.90 10e6/uL    Hemoglobin 12.5 (L) 13.3 - 17.7 g/dL    Hematocrit 39.7 (L) 40.0 - 53.0 %    MCV 72 (L) 78 - 100 fL    MCH 22.6 (L) 26.5 - 33.0 pg    MCHC 31.5 31.5 - 36.5 g/dL    RDW 16.6 (H) 10.0 - 15.0 %    Platelet Count 232 150 - 450 10e3/uL       ---------------------------------------------------------------------------------------   Options for treatment and follow-up care were reviewed with the patient. Rigoberto Laird and/or guardian was engaged and actively involved in the decision making process. Rigoberto Laird and/or guardian verbalized understanding of the options discussed and was satisfied with the final plan.    The longitudinal plan of care for the diagnosis(es)/condition(s) as documented were addressed during this visit. Due to the added complexity in care, I can continue to support Rigoberto in the subsequent management and with ongoing continuity of care related to these items if unable to see (or establish with) PCP.       Patient consented to use of ambient AI scribe prior to initiation of visit.    Signed Electronically by: GILMA Rosenbaum CNP

## 2025-05-22 NOTE — TELEPHONE ENCOUNTER
" Care advice:  See in office or video visit today.    Disposition:  Patient would like to be seen at the  today.  Able to secure an appt with  per patient request for this afternoon.  Date, time and location provided.         Reason for Disposition   Rectal bleeding, bloody stools, or blood in stool (bowel movement)    Additional Information   Negative: Passed out (e.g., fainted, lost consciousness, blacked out and was not responding)   Negative: Shock suspected (e.g., cold/pale/clammy skin, too weak to stand, low BP, rapid pulse)   Negative: Vomiting red blood or black (coffee ground) material   Negative: Sounds like a life-threatening emergency to the triager   Negative: Diarrhea is main symptom   Negative: Rectal symptoms   Negative: SEVERE abdominal pain (e.g., excruciating)   Negative: Constant abdominal pain lasting > 2 hours   Negative: Pale skin (pallor) of new-onset or getting worse   Negative: Patient sounds very sick or weak to the triager   Negative: Patient sounds very sick or weak to the triager    Answer Assessment - Initial Assessment Questions  1. APPEARANCE of BLOOD: \"What color is it?\" \"Is it passed separately, on the surface of the stool, or mixed in with the stool?\"       Mucusy, bright red    2. AMOUNT: \"How much blood was passed?\"       Streak like blood on outside of stool.  Patient report bad hemorrhoid. Preparation H used but not effective.    3. FREQUENCY: \"How many times has blood been passed with the stools?\"       Off and on for the past 2-3 weeks.    4. ONSET: \"When was the blood first seen in the stools?\" (Days or weeks)       2-3 weeks.     5. DIARRHEA: \"Is there also some diarrhea?\" If Yes, ask: \"How many diarrhea stools in the past 24 hours?\"       Denied.    6. CONSTIPATION: \"Do you have constipation?\" If Yes, ask: \"How bad is it?\"      Denied.  Soft bowel movement each occurrence.    7. RECURRENT SYMPTOMS: \"Have you had blood in your stools before?\" If Yes, ask: \"When was the " "last time?\" and \"What happened that time?\"       Recurrent symptom of blood in stool but worsening in the last 3 weeks.    8. BLOOD THINNERS: \"Do you take any blood thinners?\" (e.g., Coumadin/warfarin, Pradaxa/dabigatran, aspirin)      Denied.    9. OTHER SYMPTOMS: \"Do you have any other symptoms?\"  (e.g., abdomen pain, vomiting, dizziness, fever)      Abdominal pain.  Patient reported feeling fatigue and dizziness at times.    10. PREGNANCY: \"Is there any chance you are pregnant?\" \"When was your last menstrual period?\"        N/a    Protocols used: Stools - Unusual Color-A-OH, Rectal Bleeding-A-OH            Answer Assessment - Initial Assessment Questions  1. COLOR: \"What color is your stool?\" \"Is that color in part or all of the stool?\" (e.g., black, kaleb-colored, green, red)       Red (blood with stool)black, dark brown and dark green in stool since starting the iron medication.  Blood in stool have been off and with stool for the past 2-3 weeks.     2. ONSET: \"When did you first notice this color change?\"      Blood in stool for the past 2-3 weeks.  Feeling fatigue and abdominal pain has worsening consistent.  Patient reported bad hemorrhoid that sitting on ice most of the time has helped to relief symptom.    3. CAUSE: \"Have you eaten any food or taken any medicine of this color?\" Note: See listing in Background Information section.       Unknown.     4. OTHER SYMPTOMS: \"Do you have any other symptoms?\" (e.g., abdomen pain, diarrhea, fever, yellow eyes or skin).      Abdominal pain. Denied other symptoms other than described above.    Protocols used: Stools - Unusual Color-A-OH    " no

## 2025-05-22 NOTE — PATIENT INSTRUCTIONS
Thank you for choosing the [facility], it was a pleasure to see you today!    Please take a look at the information below for more specific details regarding the treatment plan and recommendations.    -In this after visit summary is a list of your medications and specific instructions.  Please review this carefully as there may be changes made to your medication list.  -If there are any particular questions or concerns, please feel free to reach out.  -If any labs have been completed, we will reach out to you about results.  If the results are normal or not concerning, a letter or [employer] message will be sent to you.  If any follow-up is needed, either RJ or the nurse will give you a call.  If you have not heard regarding results after 2 weeks, please reach out to the clinic.           Please seek immediate medical attention (go to the emergency room or urgent care) for the following reasons: worsening symptoms or any concerning changes.      --------------------------------------------------------------------------------------------------------------------    -We are always looking for ways to improve.  You may be selected to receive a survey regarding your visit today.  We encourage you to complete the survey and provide specific, constructive feedback to help us improve our processes. If you answer the survey/phone call this will also prompt them to STOP calling you, otherwise they will try again each day for several day! Thank you for your time!    -Please review the contact information listed on the after visit summary and in the electronic chart.  Below is the phone number that we have on file.  If there are any changes that are needed to be made, please reach out to the clinic.  [phone] (home) [phone] (work)

## 2025-05-24 ENCOUNTER — RESULTS FOLLOW-UP (OUTPATIENT)
Dept: FAMILY MEDICINE | Facility: CLINIC | Age: 55
End: 2025-05-24

## 2025-05-24 NOTE — RESULT ENCOUNTER NOTE
"Anna Marie Franklin,    I hope you are doing well. You labs are back and a bit abnormal, although different than when we last checked. Your iron levels rocketed up from 36 (low) to 352 (high). Now iron levels are the amount of iron that are circulating in your blood. Despite that, your ferritin (which is the iron that has been absorbed into cells) is just barely into the \"normal range\" at 31 (up from 16).     Overall I think we can still continue on our place as discussed in clinic: continue with iron supplement for now and plan for the colonoscopy.     Let me know if you have any questions!    Thank you,    GILMA Madrid    "

## 2025-05-27 ENCOUNTER — TELEPHONE (OUTPATIENT)
Dept: GASTROENTEROLOGY | Facility: CLINIC | Age: 55
End: 2025-05-27
Payer: COMMERCIAL

## 2025-05-27 DIAGNOSIS — K64.8 BLEEDING INTERNAL HEMORRHOIDS: Primary | ICD-10-CM

## 2025-05-27 DIAGNOSIS — Z12.11 ENCOUNTER FOR SCREENING COLONOSCOPY: ICD-10-CM

## 2025-05-27 RX ORDER — BISACODYL 5 MG/1
TABLET, DELAYED RELEASE ORAL
Qty: 4 TABLET | Refills: 0 | Status: SHIPPED | OUTPATIENT
Start: 2025-05-27 | End: 2025-05-27

## 2025-05-27 NOTE — TELEPHONE ENCOUNTER
Pre assessment completed for upcoming procedure.   (Please see previous telephone encounter notes for complete details)      Procedure details:    Arrival time and facility location reviewed.    Pre op exam needed? No.    Designated  policy reviewed. Instructed to have someone stay 6  hours post procedure.       Medication review:    Medications reviewed. Please see supporting documentation below. Holding recommendations discussed (if applicable).       Prep for procedure:   Patient stated they are not taking metformin. Patient also noted they have done miralax prep in the past and had a hard time with the volume last time.   Pt would prefer miralax prep vs Golytely prep.     Updated prep instructions sent for standard miralax prep.  Golytely/Dulcolax prescription canceled.     Procedure prep instructions reviewed.        Any additional information needed:  N/A      Patient verbalized understanding and had no questions or concerns at this time.      Carla Silverio RN  Endoscopy Procedure Pre Assessment   [phone] option 3

## 2025-05-27 NOTE — TELEPHONE ENCOUNTER
Pre visit planning completed.      Procedure details:    Patient scheduled for Colonoscopy on 6.3.2025.     Arrival time: 1015. Procedure time 1100    Facility location: [facility]; [facility] [address] [place] [address]. Check in location: 1st Floor [facility].     Sedation type: Conscious sedation     Pre op exam needed? No.    Indication for procedure:   Bleeding internal hemorrhoids [K64.8]  - Primary            Chart review:     Electronic implanted devices? No    Recent diagnosis of diverticulitis within the last 6 weeks? No      Medication review:    Diabetic? Prediabetic. Oral diabetic medications: Metformin (glucophage): HOLD day of procedure.    Anticoagulants? No    Weight loss medication/injectable? No GLP-1 medication per patient's medication list. Nursing to verify with pre-assessment call.    Other medication HOLDING recommendations:  Iron: HOLD x 7 days prior to procedure  Avoid NSAIDs like ibuprofen due to the upcoming colonoscopy per 5/22/25 progress notes.       Prep for procedure:     Bowel prep recommendation: Standard Golytely. Bowel prep sent to [id number] - [place], [place] - [address] AT [facility] [address].  Due to: diabetes    Procedure information and instructions sent via tavon Silverio RN  Endoscopy Procedure Pre Assessment   [phone] option 3

## 2025-05-27 NOTE — TELEPHONE ENCOUNTER
"Endoscopy Scheduling Screen    Caller: patient    Have you had any respiratory illness or flu-like symptoms in the last 10 days?  No    What is your communication preference for Instructions and/or Bowel Prep?   MyChart    What insurance is in the chart?  Other:  [employer]    Ordering/Referring Provider: JUDY FRY   (If ordering provider performs procedure, schedule with ordering provider unless otherwise instructed. )    BMI: Estimated body mass index is 32.22 kg/m  as calculated from the following:    Height as of 5/22/25: 1.76 m (5' 9.29\").    Weight as of 5/22/25: 99.8 kg (220 lb).     Sedation Ordered  moderate sedation.   If patient BMI > 50 do not schedule in [facility].    If patient BMI > 45 do not schedule at [facility].    Are you taking methadone or Suboxone?  NO, No RN review required.    Have you been diagnosed and are being treated for severe PTSD or severe anxiety?  NO, No RN review required.    Are you taking any prescription medications for pain 3 or more times per week?   NO, No RN review required.    Do you have a history of malignant hyperthermia?  No    (Females) Are you currently pregnant?   No     Have you been diagnosed or told you have pulmonary hypertension?   No    Do you have an LVAD?  No    Have you been told you have moderate to severe sleep apnea?  No.    Have you been told you have COPD, asthma, or any other lung disease?  No    Has your doctor ordered any cardiac tests like echo, angiogram, stress test, ablation, or EKG, that you have not completed yet?  No    Do you  have a history of any heart conditions?  No     Have you ever had or are you waiting for an organ transplant?  No. Continue scheduling, no site restrictions.    Have you had a stroke or transient ischemic attack (TIA aka \"mini stroke\") in the last 2 years?   No.    Have you been diagnosed with or been told you have cirrhosis of the liver?   No.    Are you currently on dialysis?   No    Do you need assistance " "transferring?   No    BMI: Estimated body mass index is 32.22 kg/m  as calculated from the following:    Height as of 5/22/25: 1.76 m (5' 9.29\").    Weight as of 5/22/25: 99.8 kg (220 lb).     Is patients BMI > 40 and scheduling location [facility]?  No    Do you take an injectable or oral medication for weight loss or diabetes (excluding insulin)?  No    Do you take the medication Naltrexone?  No    Do you take blood thinners?  No       Prep   Are you currently on dialysis or do you have chronic kidney disease?  No    Do you have a diagnosis of diabetes?  No    Do you have a diagnosis of cystic fibrosis (CF)?  No    On a regular basis do you go 3 -5 days between bowel movements?  No    BMI > 40?  No    Preferred Pharmacy:    [id number] - [place], [place] - [address] AT [facility] [address]  [address]  [place] [address]  Phone: [phone] Fax: [phone]      Final Scheduling Details     Procedure scheduled  Colonoscopy    Surgeon:  ABUNDIO     Date of procedure:  6/3/25     Pre-OP / PAC:   No - Not required for this site.    Location  SH - Patient preference.-and Urgency of Order    Sedation   Moderate Sedation - Per order.      Patient Reminders:   You will receive a call from a Nurse to review instructions and health history.  This assessment must be completed prior to your procedure.  Failure to complete the Nurse assessment may result in the procedure being cancelled.      On the day of your procedure, please designate an adult(s) who can drive you home stay with you for the next 24 hours. The medicines used in the exam will make you sleepy. You will not be able to drive.      You cannot take public transportation, ride share services, or non-medical taxi service without a responsible caregiver.  Medical transport services are allowed with the requirement that a responsible caregiver will receive you at your destination.  We require that drivers and " caregivers are confirmed prior to your procedure.

## 2025-06-03 ENCOUNTER — HOSPITAL ENCOUNTER (OUTPATIENT)
Facility: CLINIC | Age: 55
Discharge: HOME | End: 2025-06-03
Attending: INTERNAL MEDICINE
Payer: COMMERCIAL

## 2025-06-03 ENCOUNTER — APPOINTMENT (OUTPATIENT)
Dept: CT IMAGING | Facility: CLINIC | Age: 55
End: 2025-06-03
Attending: SURGERY
Payer: COMMERCIAL

## 2025-06-03 ENCOUNTER — RESULTS FOLLOW-UP (OUTPATIENT)
Dept: GASTROENTEROLOGY | Facility: CLINIC | Age: 55
End: 2025-06-03
Payer: COMMERCIAL

## 2025-06-03 VITALS
WEIGHT: 215 LBS | BODY MASS INDEX: 31.48 KG/M2 | OXYGEN SATURATION: 96 % | HEART RATE: 65 BPM | DIASTOLIC BLOOD PRESSURE: 94 MMHG | RESPIRATION RATE: 16 BRPM | SYSTOLIC BLOOD PRESSURE: 138 MMHG

## 2025-06-03 LAB
ALBUMIN SERPL BCG-MCNC: 4.3 G/DL (ref 3.5–5.2)
ANION GAP SERPL CALCULATED.3IONS-SCNC: 10 MMOL/L (ref 7–15)
BUN SERPL-MCNC: 10.7 MG/DL (ref 6–20)
CALCIUM SERPL-MCNC: 9.2 MG/DL (ref 8.8–10.4)
CEA SERPL-MCNC: 4.3 NG/ML
CHLORIDE SERPL-SCNC: 102 MMOL/L (ref 98–107)
COLONOSCOPY: NORMAL
CREAT SERPL-MCNC: 0.82 MG/DL (ref 0.67–1.17)
EGFRCR SERPLBLD CKD-EPI 2021: >90 ML/MIN/1.73M2
GLUCOSE SERPL-MCNC: 126 MG/DL (ref 70–99)
HCO3 SERPL-SCNC: 25 MMOL/L (ref 22–29)
PHOSPHATE SERPL-MCNC: 2.9 MG/DL (ref 2.5–4.5)
POTASSIUM SERPL-SCNC: 3.6 MMOL/L (ref 3.4–5.3)
SODIUM SERPL-SCNC: 137 MMOL/L (ref 135–145)

## 2025-06-03 PROCEDURE — 88305 TISSUE EXAM BY PATHOLOGIST: CPT | Mod: 26 | Performed by: PATHOLOGY

## 2025-06-03 PROCEDURE — 99153 MOD SED SAME PHYS/QHP EA: CPT | Performed by: COLON & RECTAL SURGERY

## 2025-06-03 PROCEDURE — 45385 COLONOSCOPY W/LESION REMOVAL: CPT | Performed by: COLON & RECTAL SURGERY

## 2025-06-03 PROCEDURE — 250N000011 HC RX IP 250 OP 636: Performed by: SURGERY

## 2025-06-03 PROCEDURE — 88341 IMHCHEM/IMCYTCHM EA ADD ANTB: CPT | Mod: 26 | Performed by: PATHOLOGY

## 2025-06-03 PROCEDURE — 88342 IMHCHEM/IMCYTCHM 1ST ANTB: CPT | Mod: 26 | Performed by: PATHOLOGY

## 2025-06-03 PROCEDURE — 250N000011 HC RX IP 250 OP 636: Performed by: COLON & RECTAL SURGERY

## 2025-06-03 PROCEDURE — 45381 COLONOSCOPY SUBMUCOUS NJX: CPT | Performed by: COLON & RECTAL SURGERY

## 2025-06-03 PROCEDURE — 80069 RENAL FUNCTION PANEL: CPT | Performed by: COLON & RECTAL SURGERY

## 2025-06-03 PROCEDURE — 36415 COLL VENOUS BLD VENIPUNCTURE: CPT | Performed by: COLON & RECTAL SURGERY

## 2025-06-03 PROCEDURE — 250N000009 HC RX 250: Performed by: SURGERY

## 2025-06-03 PROCEDURE — 45380 COLONOSCOPY AND BIOPSY: CPT | Performed by: COLON & RECTAL SURGERY

## 2025-06-03 PROCEDURE — 71260 CT THORAX DX C+: CPT

## 2025-06-03 PROCEDURE — 82378 CARCINOEMBRYONIC ANTIGEN: CPT | Performed by: COLON & RECTAL SURGERY

## 2025-06-03 PROCEDURE — 88341 IMHCHEM/IMCYTCHM EA ADD ANTB: CPT | Mod: TC | Performed by: COLON & RECTAL SURGERY

## 2025-06-03 PROCEDURE — G0500 MOD SEDAT ENDO SERVICE >5YRS: HCPCS | Performed by: COLON & RECTAL SURGERY

## 2025-06-03 RX ORDER — ONDANSETRON 2 MG/ML
4 INJECTION INTRAMUSCULAR; INTRAVENOUS EVERY 6 HOURS PRN
Status: CANCELLED | OUTPATIENT
Start: 2025-06-03

## 2025-06-03 RX ORDER — FENTANYL CITRATE 50 UG/ML
INJECTION, SOLUTION INTRAMUSCULAR; INTRAVENOUS PRN
Status: DISCONTINUED | OUTPATIENT
Start: 2025-06-03 | End: 2025-06-03 | Stop reason: HOSPADM

## 2025-06-03 RX ORDER — FLUMAZENIL 0.1 MG/ML
0.2 INJECTION, SOLUTION INTRAVENOUS
Status: CANCELLED | OUTPATIENT
Start: 2025-06-03 | End: 2025-06-04

## 2025-06-03 RX ORDER — NALOXONE HYDROCHLORIDE 0.4 MG/ML
0.4 INJECTION, SOLUTION INTRAMUSCULAR; INTRAVENOUS; SUBCUTANEOUS
Status: CANCELLED | OUTPATIENT
Start: 2025-06-03

## 2025-06-03 RX ORDER — NALOXONE HYDROCHLORIDE 0.4 MG/ML
0.2 INJECTION, SOLUTION INTRAMUSCULAR; INTRAVENOUS; SUBCUTANEOUS
Status: CANCELLED | OUTPATIENT
Start: 2025-06-03

## 2025-06-03 RX ORDER — IOPAMIDOL 755 MG/ML
109 INJECTION, SOLUTION INTRAVASCULAR ONCE
Status: COMPLETED | OUTPATIENT
Start: 2025-06-03 | End: 2025-06-03

## 2025-06-03 RX ORDER — PROCHLORPERAZINE MALEATE 10 MG
10 TABLET ORAL EVERY 6 HOURS PRN
Status: CANCELLED | OUTPATIENT
Start: 2025-06-03

## 2025-06-03 RX ORDER — ONDANSETRON 4 MG/1
4 TABLET, ORALLY DISINTEGRATING ORAL EVERY 6 HOURS PRN
Status: CANCELLED | OUTPATIENT
Start: 2025-06-03

## 2025-06-03 RX ADMIN — IOPAMIDOL 109 ML: 755 INJECTION, SOLUTION INTRAVENOUS at 14:57

## 2025-06-03 RX ADMIN — SODIUM CHLORIDE 73 ML: 9 INJECTION, SOLUTION INTRAVENOUS at 14:57

## 2025-06-03 ASSESSMENT — ACTIVITIES OF DAILY LIVING (ADL)
ADLS_ACUITY_SCORE: 41

## 2025-06-03 NOTE — H&P
Pre-Endoscopy History and Physical   Rigoberto Laird MRN# [id number]   YOB: 1970 Age: 54 year old   Date of Procedure: 6/3/2025   Primary care provider: Judy Dutton   Type of Endoscopy: colonoscopy   Reason for Procedure: personal history of polyps   Type of Anesthesia Anticipated: Moderate Sedation   HPI:   Rigoberto is a 54 year old male with a personal history of polyps.  He reports having had a colonoscopy 4 years ago where polyps were removed.  He denies abdominal pain, nausea/vomiting, changes in bowel habits or unintentional weight loss.  He denies a FH of CRC.  He does report new onset intermittent rectal bleeding as well as anorectal pain/discomfort.    Allergies   Allergen Reactions    Tamiflu [Oseltamivir] Rash      Prior to Admission Medications   Prescriptions Last Dose Informant Patient Reported? Taking?   Blood Glucose Monitoring Suppl (ACCU-CHEK GUIDE ME) w/Device KIT   Yes No   Sig: USE TO TEST BLOOD SUGAR 1 TIMES DAILY   COSENTYX SENSOREADY  MG/ML Sensoready pen More than a month  Yes Yes   Si mg once every six weeks.   Cholecalciferol (VITAMIN D3) 660263 UNIT/GM POWD 2025  Yes Yes   Sig: Take 100 mcg by mouth   Multiple Vitamin (MULTI VITAMIN) TABS   Yes No   Sig: Take 1 tablet by mouth daily   Turmeric 400 MG CAPS   Yes No   Sig: TURMERIC 1/day   allopurinol (ZYLOPRIM) 100 MG tablet 2025  No Yes   Sig: Take 2 tablets (200 mg) by mouth daily.   blood glucose (NO BRAND SPECIFIED) test strip   No No   Sig: Use to test blood sugar 1 times daily or as directed. To accompany: Blood Glucose Monitor Brands: per insurance.   blood glucose monitoring (NO BRAND SPECIFIED) meter device kit   No No   Sig: Use to test blood sugar 1 times daily or as directed. Preferred blood glucose meter OR supplies to accompany: Blood Glucose Monitor Brands: per insurance.   colchicine (COLCRYS) 0.6 MG tablet 2025  Yes Yes   ferrous sulfate (FEROSUL) 325 (65 Fe) MG tablet Past Week  No  "Yes   Sig: Take 1 tablet (325 mg) by mouth daily (with breakfast).   omeprazole (PRILOSEC) 40 MG capsule 6/2/2025  Yes Yes   Sig: [OMEPRAZOLE (PRILOSEC) 40 MG CAPSULE] Take 40 mg by mouth 2 (two) times a day before meals.   predniSONE (DELTASONE) 5 MG tablet   No No   Sig: Take as directed.   thin (NO BRAND SPECIFIED) lancets Unknown  No No   Sig: Use with lanceting device. To accompany: Blood Glucose Monitor Brands: per insurance.   vitamin C 250 MG tablet   Yes No   Sig: Take 250 mg by mouth.      Facility-Administered Medications: None      Patient Active Problem List   Diagnosis    Precordial pain    Articular gout    Chronic prostatitis    Gastric polyp    Immunosuppressed status    Arthritis    Inflammatory polyarthritis (H)    Malignant neoplasm of bladder (H)    Pain, joint, multiple sites    Patellofemoral syndrome, left    Proctalgia    Psoriasis with arthropathy (H)      Past Medical History:   Diagnosis Date    Arthritis     Gastroesophageal reflux disease with esophagitis     Gout       History reviewed. No pertinent surgical history.   Social History     Tobacco Use    Smoking status: Some Days     Types: Cigarettes     Start date: 1/1/2004    Smokeless tobacco: Never   Substance Use Topics    Alcohol use: Yes     Comment: 1 daily      History reviewed. No pertinent family history.   PHYSICAL EXAM:   /86   Pulse 73   Resp 16   Wt 97.5 kg (215 lb)   SpO2 98%   BMI 31.48 kg/m   Estimated body mass index is 31.48 kg/m  as calculated from the following:    Height as of 5/22/25: 1.76 m (5' 9.29\").    Weight as of this encounter: 97.5 kg (215 lb).   RESP: lungs clear to auscultation - no rales, rhonchi or wheezes   CV: regular rates and rhythm   ASA Class 2 - Mild systemic disease    Assessment: 53 y/o gentleman with a personal history of polyps    Plan: Colonoscopy with moderate sedation.  Risks of the procedure were discussed including, but not limited to, bleeding, perforation and missed " lesions.  Patient understands and is willing to proceed.    Ezequiel Younger MD ....................  6/3/2025   11:50 AM  Colon and Rectal Surgery Staff  [phone]

## 2025-06-05 ENCOUNTER — ANCILLARY PROCEDURE (OUTPATIENT)
Dept: GENERAL RADIOLOGY | Facility: CLINIC | Age: 55
End: 2025-06-05
Attending: FAMILY MEDICINE
Payer: COMMERCIAL

## 2025-06-05 ENCOUNTER — OFFICE VISIT (OUTPATIENT)
Dept: FAMILY MEDICINE | Facility: CLINIC | Age: 55
End: 2025-06-05
Payer: COMMERCIAL

## 2025-06-05 VITALS
BODY MASS INDEX: 30.66 KG/M2 | HEIGHT: 70 IN | WEIGHT: 214.19 LBS | OXYGEN SATURATION: 98 % | HEART RATE: 82 BPM | SYSTOLIC BLOOD PRESSURE: 122 MMHG | RESPIRATION RATE: 16 BRPM | DIASTOLIC BLOOD PRESSURE: 82 MMHG | TEMPERATURE: 98 F

## 2025-06-05 DIAGNOSIS — L40.50 PSORIASIS WITH ARTHROPATHY (H): ICD-10-CM

## 2025-06-05 DIAGNOSIS — E11.9 TYPE 2 DIABETES MELLITUS WITHOUT COMPLICATION, WITHOUT LONG-TERM CURRENT USE OF INSULIN (H): ICD-10-CM

## 2025-06-05 DIAGNOSIS — Z01.818 PREOP GENERAL PHYSICAL EXAM: ICD-10-CM

## 2025-06-05 DIAGNOSIS — Z01.818 PREOP GENERAL PHYSICAL EXAM: Primary | ICD-10-CM

## 2025-06-05 DIAGNOSIS — K63.89 COLONIC MASS: ICD-10-CM

## 2025-06-05 LAB
ATRIAL RATE - MUSE: 74 BPM
DIASTOLIC BLOOD PRESSURE - MUSE: NORMAL MMHG
INTERPRETATION ECG - MUSE: NORMAL
P AXIS - MUSE: 52 DEGREES
PR INTERVAL - MUSE: 144 MS
QRS DURATION - MUSE: 94 MS
QT - MUSE: 388 MS
QTC - MUSE: 430 MS
R AXIS - MUSE: 8 DEGREES
SYSTOLIC BLOOD PRESSURE - MUSE: NORMAL MMHG
T AXIS - MUSE: 23 DEGREES
VENTRICULAR RATE- MUSE: 74 BPM

## 2025-06-05 NOTE — PROGRESS NOTES
Preoperative Evaluation  [address]  [place] [address]  Phone: [phone]  Fax: [phone]  Primary Provider: Judy Dutton DO  Pre-op Performing Provider: Caitlin Mariscal MD  Jun 5, 2025 6/5/2025   Surgical Information   What procedure is being done? colectomy sigmoid   Facility or Hospital where procedure/surgery will be performed: [facility]   Who is doing the procedure / surgery? dr Jason Fajardo   Date of surgery / procedure: june 16 2025   Time of surgery / procedure: 730am   Where do you plan to recover after surgery? at home with family     Fax number for surgical facility: Note does not need to be faxed, will be available electronically in Epic.    Assessment & Plan     The proposed surgical procedure is considered INTERMEDIATE risk.    Preop general physical exam  Colonic mass: pathology pending- planning resection/colectomy. Per recommendations of colorectal surgery office, plan for EKG and CXR due to age over 50, although he does not have any other risk factors.  - EKG 12-lead, tracing only  - XR Chest 2 Views    Type 2 diabetes mellitus without complication, without long-term current use of insulin (H): Diet controlled- A1C at goal.     Psoriasis with arthropathy (H): Follows with specialist- uses prednisone 5 mg as needed for flares- refilled recently but has not used within the last 14 days.        Risks and Recommendations  The patient has the following additional risks and recommendations for perioperative complications:   - Recurrent use of steroids    Preoperative Medication Instructions  Antiplatelet or Anticoagulation Medication Instructions   - We reviewed the medication list and the patient is not on an antiplatelet or anticoagulation medications.    Additional Medication Instructions: Prednisone   - Take usual dose on day of surgery   - Intraoperative stress dose steroids may be indicated due to chronic steroid use in the  last 3 months (e.g. > 3 weeks of prednisone 20 mg or daily prednisone 5 mg).    Hold colchicine day of surgery, then resume when able to take oral medications    Continue taking all other medications on day of surgery with small sip of water.    Recommendation  Approval given to proceed with proposed procedure, without further diagnostic evaluation.      Kamila Franklin is a 54 year old, presenting for the following:  Pre-Op Exam          6/5/2025    12:39 PM   Additional Questions   Roomed by LADONNA Shukla   Accompanied by self     HPI: history of colon polyps - had diagnostic colonoscopy due to anemia and rectal bleeding and found to have sigmoid mass around colon- planning for surgical resection.        6/5/2025   Pre-Op Questionnaire   Have you ever had a heart attack or stroke? No   Have you ever had surgery on your heart or blood vessels, such as a stent placement, a coronary artery bypass, or surgery on an artery in your head, neck, heart, or legs? No   Do you have chest pain with activity? No   Do you have a history of heart failure? No   Do you currently have a cold, bronchitis or symptoms of other infection? No   Do you have a cough, shortness of breath, or wheezing? No   Do you or anyone in your family have previous history of blood clots? No   Do you or does anyone in your family have a serious bleeding problem such as prolonged bleeding following surgeries or cuts? No   Have you ever had problems with anemia or been told to take iron pills? (!) YES, on iron   Have you had any abnormal blood loss such as black, tarry or bloody stools? (!) YES, rectal bleeding   Have you ever had a blood transfusion? No   Are you willing to have a blood transfusion if it is medically needed before, during, or after your surgery? Yes   Have you or any of your relatives ever had problems with anesthesia? No   Do you have sleep apnea, excessive snoring or daytime drowsiness? No   Do you have any artifical heart valves  or other implanted medical devices like a pacemaker, defibrillator, or continuous glucose monitor? No   Do you have artificial joints? No   Are you allergic to latex? No     Advance Care Planning    Not on file    Preoperative Review of    reviewed - no record of controlled substances prescribed.          Patient Active Problem List    Diagnosis Date Noted    Gastric polyp 02/21/2025     Priority: Medium    Immunosuppressed status 02/21/2025     Priority: Medium    Pain, joint, multiple sites 02/21/2025     Priority: Medium    Patellofemoral syndrome, left 02/21/2025     Priority: Medium    Proctalgia 02/21/2025     Priority: Medium    Articular gout 08/23/2023     Priority: Medium    Chronic prostatitis 08/08/2023     Priority: Medium    Malignant neoplasm of bladder (H) 08/08/2023     Priority: Medium    Inflammatory polyarthritis (H) 01/24/2022     Priority: Medium    Psoriasis with arthropathy (H) 05/01/2020     Priority: Medium    Arthritis 01/01/2020     Priority: Medium     Previously treated as Plantar fasciitis, was found to be a form of Arthritis.    Have seen 3 specialists, now under the care of Dr. Garcia at the [facility].  He has recently deferred some of the care (Gout) to my primary Doctor.      Precordial pain 08/23/2019     Priority: Medium      Past Medical History:   Diagnosis Date    Arthritis     Gastroesophageal reflux disease with esophagitis     Gout      Past Surgical History:   Procedure Laterality Date    COLONOSCOPY N/A 6/3/2025    Procedure: Colonoscopy, Screening, High Risk;  Surgeon: Ezequiel Younger MD;  Location:  GI    COLONOSCOPY N/A 6/3/2025    Procedure: COLONOSCOPY, WITH POLYPECTOMY AND BIOPSY;  Surgeon: Ezequiel Younger MD;  Location:  GI     Current Outpatient Medications   Medication Sig Dispense Refill    allopurinol (ZYLOPRIM) 100 MG tablet Take 2 tablets (200 mg) by mouth daily. 180 tablet 1    blood glucose (NO BRAND SPECIFIED) test strip Use to test blood  "sugar 1 times daily or as directed. To accompany: Blood Glucose Monitor Brands: per insurance. 100 strip 6    blood glucose monitoring (NO BRAND SPECIFIED) meter device kit Use to test blood sugar 1 times daily or as directed. Preferred blood glucose meter OR supplies to accompany: Blood Glucose Monitor Brands: per insurance. 1 kit 0    Blood Glucose Monitoring Suppl (ACCU-CHEK GUIDE ME) w/Device KIT USE TO TEST BLOOD SUGAR 1 TIMES DAILY      Cholecalciferol (VITAMIN D3) 407822 UNIT/GM POWD Take 100 mcg by mouth      colchicine (COLCRYS) 0.6 MG tablet       COSENTYX SENSOREADY  MG/ML Sensoready pen 150 mg once every six weeks.      ferrous sulfate (FEROSUL) 325 (65 Fe) MG tablet Take 1 tablet (325 mg) by mouth daily (with breakfast). 90 tablet 0    Multiple Vitamin (MULTI VITAMIN) TABS Take 1 tablet by mouth daily      omeprazole (PRILOSEC) 40 MG capsule [OMEPRAZOLE (PRILOSEC) 40 MG CAPSULE] Take 40 mg by mouth 2 (two) times a day before meals.      predniSONE (DELTASONE) 5 MG tablet Take as directed. 50 tablet 0    thin (NO BRAND SPECIFIED) lancets Use with lanceting device. To accompany: Blood Glucose Monitor Brands: per insurance. 100 each 6    Turmeric 400 MG CAPS TURMERIC 1/day      vitamin C 250 MG tablet Take 250 mg by mouth.         Allergies   Allergen Reactions    Tamiflu [Oseltamivir] Rash        Social History     Tobacco Use    Smoking status: Some Days     Current packs/day: 0.10     Average packs/day: 0.1 packs/day for 21.4 years (2.1 ttl pk-yrs)     Types: Cigarettes     Start date: 1/1/2004     Passive exposure: Never    Smokeless tobacco: Never   Substance Use Topics    Alcohol use: Yes     Comment: 1 daily       History   Drug Use Unknown               Objective    /82 (BP Location: Left arm, Patient Position: Sitting, Cuff Size: Adult Regular)   Pulse 82   Temp 98  F (36.7  C) (Oral)   Resp 16   Ht 1.786 m (5' 10.32\")   Wt 97.2 kg (214 lb 3 oz)   SpO2 98%   BMI 30.46 kg/m   " "  Estimated body mass index is 30.46 kg/m  as calculated from the following:    Height as of this encounter: 1.786 m (5' 10.32\").    Weight as of this encounter: 97.2 kg (214 lb 3 oz).  Physical Exam  GENERAL: alert and no distress  EYES: Eyes grossly normal to inspection, PERRL and conjunctivae and sclerae normal  HENT: ear canals and TM's normal, nose and mouth without ulcers or lesions  NECK: no adenopathy, no asymmetry, masses, or scars  RESP: lungs clear to auscultation - no rales, rhonchi or wheezes  CV: regular rate and rhythm, normal S1 S2, no S3 or S4, no murmur, click or rub  NEURO: Normal strength and tone, mentation intact and speech normal  PSYCH: mentation appears normal, affect normal/bright    Recent Labs   Lab Test 06/03/25  1435 05/22/25  1542 05/13/25  1000 02/21/25  1706   HGB  --  12.5* 12.3* 12.5*   PLT  --  232  --  235     --   --  143   POTASSIUM 3.6  --   --  4.0   CR 0.82  --   --  0.90   A1C  --  5.9*  --  6.5*        Diagnostics  Recent Results (from the past 720 hours)   Hemoglobin with Reflex to Iron Studies    Collection Time: 05/13/25 10:00 AM   Result Value Ref Range    Hemoglobin 12.3 (L) 13.3 - 17.7 g/dL   Extra Green Top (Lithium Heparin) Tube    Collection Time: 05/13/25 10:00 AM   Result Value Ref Range    Hold Specimen [facility]    Iron & Iron Binding Capacity    Collection Time: 05/13/25 10:00 AM   Result Value Ref Range    Iron 36 (L) 61 - 157 ug/dL    Iron Binding Capacity 524 (H) 240 - 430 ug/dL    Iron Sat Index 7 (L) 15 - 46 %   Ferritin    Collection Time: 05/13/25 10:00 AM   Result Value Ref Range    Ferritin 16 (L) 31 - 409 ng/mL   Albumin Random Urine Quantitative with Creat Ratio    Collection Time: 05/13/25 10:07 AM   Result Value Ref Range    Creatinine Urine mg/dL 141.0 mg/dL    Albumin Urine mg/L <12.0 mg/L    Albumin Urine mg/g Cr     **Hemoglobin A1c FUTURE 3mo    Collection Time: 05/22/25  3:42 PM   Result Value Ref Range    Estimated Average Glucose 123 (H) " <117 mg/dL    Hemoglobin A1C 5.9 (H) 0.0 - 5.6 %   CBC with Platelets and Reflex to Iron Studies    Collection Time: 05/22/25  3:42 PM   Result Value Ref Range    WBC Count 7.8 4.0 - 11.0 10e3/uL    RBC Count 5.53 4.40 - 5.90 10e6/uL    Hemoglobin 12.5 (L) 13.3 - 17.7 g/dL    Hematocrit 39.7 (L) 40.0 - 53.0 %    MCV 72 (L) 78 - 100 fL    MCH 22.6 (L) 26.5 - 33.0 pg    MCHC 31.5 31.5 - 36.5 g/dL    RDW 16.6 (H) 10.0 - 15.0 %    Platelet Count 232 150 - 450 10e3/uL   Extra Green Top (Lithium Heparin) Tube    Collection Time: 05/22/25  3:42 PM   Result Value Ref Range    Hold Specimen [facility]    Iron & Iron Binding Capacity    Collection Time: 05/22/25  3:42 PM   Result Value Ref Range    Iron 352 (H) 61 - 157 ug/dL    Iron Binding Capacity 476 (H) 240 - 430 ug/dL    Iron Sat Index 74 (H) 15 - 46 %   Ferritin    Collection Time: 05/22/25  3:42 PM   Result Value Ref Range    Ferritin 31 31 - 409 ng/mL   COLONOSCOPY    Collection Time: 06/03/25 11:34 AM   Result Value Ref Range    COLONOSCOPY       [address] [address] [place]  [address]  _______________________________________________________________________________  Patient Name: Rigoberto Sisi              Procedure Date: 6/3/2025 11:34 AM  MRN: [id number]                       Account Number: [id number]  YOB: 1970              Admit Type: Outpatient  Age: 54                               Room: 1                          Note Status: Finalized                Attending MD: BRENNON GRIFFIN , ,   Total Sedation Time: 29 mins of continuous bedside 1:1 Instrument Name: 522   [id number] Adult Colon  _______________________________________________________________________________     Procedure:                Colonoscopy  Indications:              High risk colon cancer surveillance: Personal                             history of colonic polyps, Incidental - Rectal                             bleeding  Providers:                Carmel VALENTIN  Ifeoma, RN, BRENNON Ceja MD:             JUDY FRY  Medicines:                Fentanyl 100 micrograms IV, Midazolam 2 mg IV  Complications:            No immediate complications. Estimated blood loss:                             Minimal.  _______________________________________________________________________________  Procedure:                Pre-Anesthesia Assessment:                            - Prior to the procedure, a History and Physical                             was performed, and patient medications and                             allergies were reviewed. The patient is competent.                             The risks and benefits of the procedure and the                             sedation options and risks were discussed with the                             patient. All questions were answered and informed                             consent was obtained. Patient identification and                             proposed procedure were verified by the physician                             and the nurse in the [facility] room. Mental Status                             Examination: alert and oriented. Airway                             Examination: normal oropharyngeal airway and neck                             mobility and Mallampati Class II (the uvula but not                             tonsillar pillars visualized). Respiratory                             Examination: clear to auscultation. CV Examination:                             RRR, no murmurs, no S3 or S4. Prophylactic                             Antibiotics: The patient does not require                             prophylactic antibiotics. Prior Anticoagulants: The                             patient has taken no anticoagulant or antiplatelet                             agents. ASA Grade Assessment: II - A patient with                             mild systemic disease. After reviewing the risks                              and benefits, the patient was deemed in                             satisfactory condition to undergo the procedur e.                             The anesthesia plan was to use moderate sedation /                             analgesia (conscious sedation). Immediately prior                             to administration of medications, the patient was                             re-assessed for adequacy to receive sedatives. The                             heart rate, respiratory rate, oxygen saturations,                             blood pressure, adequacy of pulmonary ventilation,                             and response to care were monitored throughout the                             procedure. The physical status of the patient was                             re-assessed after the procedure.                            After obtaining informed consent, the colonoscope                             was passed under direct vision. Throughout the                             procedure, the patient's blood pressure, pulse, and                             oxygen saturations were monitored continuously.  The                             522 was introduced through the anus and advanced to                             the cecum, identified by appendiceal orifice and                             ileocecal valve. The ileocecal valve, the                             appendiceal orifice and the rectum were                             photographed. The colonoscopy was performed without                             difficulty. The patient tolerated the procedure                             well. The quality of the bowel preparation was                             evaluated using the BBPS (Mentor Bowel Preparation                             Scale) with scores of: Right Colon = 3 (entire                             mucosa seen well with no residual staining, small                             fragments of stool or opaque liquid),  Transverse                             Colon = 3 (entire mucosa seen well with no residual                             staining, small fragments of stool or opaque                              liquid) and Left Colon = 3 (entire mucosa seen well                             with no residual staining, small fragments of stool                             or opaque liquid). The total BBPS score equals 9.                             The quality of the bowel preparation was good.                             Scope withdrawal time was 21 minutes. The total                             duration of the procedure was 25 minutes.                                                                                   Findings:       The digital rectal exam was normal.       A 2 mm polyp was found in the cecum. The polyp was sessile. The polyp        was removed with a cold snare. Resection and retrieval were complete.        Verification of patient identification for the specimen was done by the        nurse. Estimated blood loss was minimal.       A 4 mm polyp was found in the transverse colon. The polyp was sessile.        The polyp was removed with a cold snare. Resection  and retrieval were        complete. Verification of patient identification for the specimen was        done by the nurse. Estimated blood loss was minimal.       An 8 mm polyp was found in the descending colon. The polyp was sessile.        The polyp was removed with a cold snare. Resection was complete, but the        polyp tissue was not retrieved. Estimated blood loss was minimal.       A fungating non-obstructing medium-sized mass was found in the mid        sigmoid colon. The mass was partially circumferential (involving        one-third of the lumen circumference). No bleeding was present. Biopsies        were taken with a cold forceps for histology. For location marking, one        hemostatic clip was successfully placed. Area was tattooed with an         injection of 0.5 mL of Phylicia ink in 3 quadrants just distal to the mass.        Estimated blood loss was minimal.       No additional abnormalities were found on retroflexion.                                                                                    Impression:               - One 2 mm polyp in the cecum, removed with a cold                             snare. Resected and retrieved.                            - One 4 mm polyp in the transverse colon, removed                             with a cold snare. Resected and retrieved.                            - One 8 mm polyp in the descending colon, removed                             with a cold snare. Complete resection. Polyp tissue                             not retrieved.                            - Likely malignant tumor in the mid sigmoid colon.                             Tattooed.  Recommendation:           - Discharge patient to home (ambulatory).                            - Await pathology results.                            - Refer to a colo-rectal surgeon at appointment to                             be scheduled. Our office will contact you to                             arrange follow up ([phone]).                                                                                    Procedure Code(s):       --- Professional ---       51765, Colonoscopy, flexible; with removal of tumor(s), polyp(s), or        other lesion(s) by snare technique       87499, Colonoscopy, flexible; with directed submucosal injection(s), any        substance  Diagnosis Code(s):       --- Professional ---       Z86.010, Personal history of colonic polyps       D12.0, Benign neoplasm of cecum       D12.3, Benign neoplasm of transverse colon (hepatic flexure or splenic        flexure)       D12.4, Benign neoplasm of descending colon       D49.0, Neoplasm of unspecified behavior of digestive system    CPT copyright 2022 [employer]. All rights  reserved.    The codes documented in this report are preliminary and upon  review may   be revised to meet current compliance requirements.    Electronic Signature by: Dr. Griffin  ________________  BRENNON GRIFFIN,   6/3/2025 12:36:28 PM  I was physically present for the entire viewing  portion of the exam.  BRENNON GRIFFIN  Number of Addenda: 0    Note Initiated On: 6/3/2025 11:34 AM  Scope Withdrawal Time: 0 hours 21 minutes 8 seconds   Total Procedure Duration: 0 hours 25 minutes 18 seconds   Scope In: 11:58:40 AM  Scope Out: 12:23:58 PM     CEA    Collection Time: 06/03/25  2:35 PM   Result Value Ref Range    CEA 4.3 ng/mL   Renal panel    Collection Time: 06/03/25  2:35 PM   Result Value Ref Range    Sodium 137 135 - 145 mmol/L    Potassium 3.6 3.4 - 5.3 mmol/L    Chloride 102 98 - 107 mmol/L    Carbon Dioxide (CO2) 25 22 - 29 mmol/L    Anion Gap 10 7 - 15 mmol/L    Glucose 126 (H) 70 - 99 mg/dL    Urea Nitrogen 10.7 6.0 - 20.0 mg/dL    Creatinine 0.82 0.67 - 1.17 mg/dL    GFR Estimate >90 >60 mL/min/1.73m2    Calcium 9.2 8.8 - 10.4 mg/dL    Albumin 4.3 3.5 - 5.2 g/dL    Phosphorus 2.9 2.5 - 4.5 mg/dL      EKG required due to age >50 per colorectal surgery recommendations on their preop notes    CXR required due to age >50 per colorectal surgery recommendations on their preop notes    Revised Cardiac Risk Index (RCRI)  The patient has the following serious cardiovascular risks for perioperative complications:   - No serious cardiac risks = 0 points     RCRI Interpretation: 0 points: Class I (very low risk - 0.4% complication rate)         Signed Electronically by: Caitlin Mariscal MD  A copy of this evaluation report is provided to the requesting physician.

## 2025-06-05 NOTE — PATIENT INSTRUCTIONS
How to Take Your Medication Before Surgery  Preoperative Medication Instructions   Antiplatelet or Anticoagulation Medication Instructions   - We reviewed the medication list and the patient is not on an antiplatelet or anticoagulation medications.    Additional Medication Instructions    Hold colchicine day of surgery, then resume when able to take oral medications    Continue taking all other medications on day of surgery with small sip of water.    Prednisone  - Do not take the day of surgery (patient takes this as needed- has not taken any recently- uses as needed for psoriatic arthritis flares)       Patient Education   Preparing for Your Surgery  For Adults  Getting started  In most cases, a nurse will call to review your health history and instructions. They will give you an arrival time based on your scheduled surgery time. Please be ready to share:  Your doctor's clinic name and phone number  Your medical, surgical, and anesthesia history  A list of allergies and sensitivities  A list of medicines, including herbal treatments and over-the-counter drugs  Whether the patient has a legal guardian (ask how to send us the papers in advance)  Note: You may not receive a call if you were seen at our PAC (Preoperative Assessment Center).  Please tell us if you're pregnant--or if there's any chance you might be pregnant. Some surgeries may injure a fetus (unborn baby), so they require a pregnancy test. Surgeries that are safe for a fetus don't always need a test, and you can choose whether to have one.   Preparing for surgery  Within 10 to 30 days of surgery: Have a pre-op exam (sometimes called an H&P, or History and Physical). This can be done at a clinic or pre-operative center.  If you're having a , you may not need this exam. Talk to your care team.  At your pre-op exam, talk to your care team about all medicines you take. (This includes CBD oil and any drugs, such as THC, marijuana, and other forms of  cannabis.) If you need to stop any medicine before surgery, ask when to start taking it again.  This is for your safety. Many medicines and drugs can make you bleed too much during surgery. Some change how well surgery (anesthesia) drugs work.  Call your insurance company to let them know you're having surgery. (If you don't have insurance, call [phone].)  Call your clinic if there's any change in your health. This includes a scrape or scratch near the surgery site, or any signs of a cold (sore throat, runny nose, cough, rash, fever).  Eating and drinking guidelines  For your safety: Unless your surgeon tells you otherwise, follow the guidelines below.  Eat and drink as normal until 8 hours before you arrive for surgery. After that, no food or milk. You can spit out gum when you arrive.  Drink clear liquids until 2 hours before you arrive. These are liquids you can see through, like water, Gatorade, and Propel Water. They also include plain black coffee and tea (no cream or milk).  No alcohol for 24 hours before you arrive. The night before surgery, stop any drinks that contain THC.  If your care team tells you to take medicine on the morning of surgery, it's okay to take it with a sip of water. No other medicines or drugs are allowed (including CBD oil)--follow your care team's instructions.  If you have questions the day of surgery, call your hospital or surgery center.   Preventing infection  Shower or bathe the night before and the morning of surgery. Follow the instructions your clinic gave you. (If no instructions, use regular soap.)  Don't shave or clip hair near your surgery site. We'll remove the hair if needed.  Don't smoke or vape the morning of surgery. No chewing tobacco for 6 hours before you arrive. A nicotine patch is okay. You may spit out nicotine gum when you arrive.  For some surgeries, the surgeon will tell you to fully quit smoking and nicotine.  We will make every effort to keep you safe  from infection. We will:  Clean our hands often with soap and water (or an alcohol-based hand rub).  Clean the skin at your surgery site with a special soap that kills germs.  Give you a special gown to keep you warm. (Cold raises the risk of infection.)  Wear hair covers, masks, gowns, and gloves during surgery.  Give antibiotic medicine, if prescribed. Not all surgeries need this medicine.  What to bring on the day of surgery  Photo ID and insurance card  Copy of your health care directive, if you have one  Glasses and hearing aids (bring cases)  You can't wear contacts during surgery  Inhaler and eye drops, if you use them (tell us about these when you arrive)  CPAP machine or breathing device, if you use them  A few personal items, if spending the night  If you have . . .  A pacemaker, ICD (cardiac defibrillator), or other implant: Bring the ID card.  An implanted stimulator: Bring the remote control.  A legal guardian: Bring a copy of the certified (court-stamped) guardianship papers.  Please remove any jewelry, including body piercings. Leave jewelry and other valuables at home.  If you're going home the day of surgery  You must have a support person drive you home. They should stay with you overnight, and they may need to help with your self-care.  If you don't have a support person, please tells us as soon as possible. We can help.  After surgery  If it's hard to control your pain or you need more pain medicine, please call your surgeon's office.  Questions?   If you have any questions for your care team, list them here:   ____________________________________________________________________________________________________________________________________________________________________________________________________________________________________________________________  For informational purposes only. Not to replace the advice of your health care provider. Copyright   2003, 2019 [facility].  All rights reserved. Clinically reviewed by Zay Thomas MD. siOPTICA [id number] - REV 02/25.

## 2025-06-06 ENCOUNTER — RESULTS FOLLOW-UP (OUTPATIENT)
Dept: FAMILY MEDICINE | Facility: CLINIC | Age: 55
End: 2025-06-06

## 2025-06-06 LAB
PATH REPORT.COMMENTS IMP SPEC: ABNORMAL
PATH REPORT.COMMENTS IMP SPEC: YES
PATH REPORT.FINAL DX SPEC: ABNORMAL
PATH REPORT.GROSS SPEC: ABNORMAL
PATH REPORT.MICROSCOPIC SPEC OTHER STN: ABNORMAL
PATH REPORT.RELEVANT HX SPEC: ABNORMAL
PATHOLOGY SYNOPTIC REPORT: ABNORMAL
PHOTO IMAGE: ABNORMAL

## 2025-06-10 PROBLEM — D12.6 ADENOMATOUS POLYP OF COLON: Status: ACTIVE | Noted: 2025-06-10

## 2025-06-13 ENCOUNTER — MYC MEDICAL ADVICE (OUTPATIENT)
Dept: FAMILY MEDICINE | Facility: CLINIC | Age: 55
End: 2025-06-13
Payer: COMMERCIAL

## 2025-06-27 ENCOUNTER — ANESTHESIA EVENT (OUTPATIENT)
Dept: SURGERY | Facility: HOSPITAL | Age: 55
End: 2025-06-27
Payer: COMMERCIAL

## 2025-06-27 RX ORDER — FERROUS SULFATE 325(65) MG
325 TABLET, DELAYED RELEASE (ENTERIC COATED) ORAL DAILY
Status: ON HOLD | COMMUNITY
End: 2025-06-30

## 2025-06-29 ENCOUNTER — OFFICE VISIT (OUTPATIENT)
Dept: URGENT CARE | Facility: URGENT CARE | Age: 55
End: 2025-06-29
Payer: COMMERCIAL

## 2025-06-29 VITALS
BODY MASS INDEX: 30.64 KG/M2 | HEIGHT: 70 IN | TEMPERATURE: 97.3 F | DIASTOLIC BLOOD PRESSURE: 82 MMHG | SYSTOLIC BLOOD PRESSURE: 154 MMHG | WEIGHT: 214 LBS | OXYGEN SATURATION: 97 % | RESPIRATION RATE: 16 BRPM | HEART RATE: 80 BPM

## 2025-06-29 DIAGNOSIS — R35.0 URINARY FREQUENCY: Primary | ICD-10-CM

## 2025-06-29 LAB
ALBUMIN UR-MCNC: NEGATIVE MG/DL
APPEARANCE UR: CLEAR
BILIRUB UR QL STRIP: NEGATIVE
COLOR UR AUTO: YELLOW
GLUCOSE UR STRIP-MCNC: NEGATIVE MG/DL
HGB UR QL STRIP: NEGATIVE
KETONES UR STRIP-MCNC: ABNORMAL MG/DL
LEUKOCYTE ESTERASE UR QL STRIP: NEGATIVE
NITRATE UR QL: NEGATIVE
PH UR STRIP: 5.5 [PH] (ref 5–8)
SP GR UR STRIP: 1.02 (ref 1–1.03)
UROBILINOGEN UR STRIP-ACNC: 0.2 E.U./DL

## 2025-06-29 PROCEDURE — 81003 URINALYSIS AUTO W/O SCOPE: CPT | Performed by: PHYSICIAN ASSISTANT

## 2025-06-29 PROCEDURE — 99213 OFFICE O/P EST LOW 20 MIN: CPT | Performed by: PHYSICIAN ASSISTANT

## 2025-06-29 NOTE — H&P (VIEW-ONLY)
Assessment & Plan     Urinary frequency  Patient was seen for 2 day history of urinary frequency and some pressure .  He has no abdominal pain fevers nausea vomiting or significant back pain.  UA is not suggestive of UTI.  No glucosuria is noted.  Reassurance provided to patient that he does not have a UTI.  His symptoms do not suggest prostatitis as he has no restrictive voiding symptoms.  I do recommend he monitor his blood sugars at home.  He does have trace ketones in his urine encouraged high fluid intake.  Nothing on today's exam or history suggests that his surgery would need to be delayed however if anything changes between today and tomorrow he should discuss with his anesthesiologist and general surgeon.  - UA Macroscopic with reflex to Microscopic and Culture - Lab Collect        Crystal Molina PA-C  [facility] URGENT CARE [facility]     Rigoberto is a 55 year old male who presents to clinic today for the following health issues:  Chief Complaint   Patient presents with    Urgent Care     UTI x 2 days        HPI  Pt is a 55 year old male who presents with concern re: possible uti.    Hx of bladder cancer, diabetes, colon mass with pending surgery tomorrow, immune supression on cosentyx.  Not taking prednisone.    Frequency: pressure in the lower abdomen. Urinating a few extra times per day, no dysuria. No urgency. No difficulty starting stream. No hematuria.     No scrotal masses or pain.      No nausea, vomiting.  Some ache in the lower abdomen and back.    Surgery tomorrow for colon mass with planned colectomy.    BS: does not monitor at home.    Bladder: no recurrance.        Review of Systems  Constitutional, HEENT, cardiovascular, pulmonary, gi and gu systems are negative, except as otherwise noted.      Patient Active Problem List   Diagnosis    Precordial pain    Articular gout    Chronic prostatitis    Gastric polyp    Immunosuppressed status    Arthritis    Inflammatory  "polyarthritis (H)    Malignant neoplasm of bladder (H)    Pain, joint, multiple sites    Patellofemoral syndrome, left    Proctalgia    Psoriasis with arthropathy (H)    Adenomatous polyp of colon     Current Outpatient Medications   Medication Sig Dispense Refill    allopurinol (ZYLOPRIM) 100 MG tablet Take 2 tablets (200 mg) by mouth daily. 180 tablet 1    blood glucose (NO BRAND SPECIFIED) test strip Use to test blood sugar 1 times daily or as directed. To accompany: Blood Glucose Monitor Brands: per insurance. 100 strip 6    blood glucose monitoring (NO BRAND SPECIFIED) meter device kit Use to test blood sugar 1 times daily or as directed. Preferred blood glucose meter OR supplies to accompany: Blood Glucose Monitor Brands: per insurance. 1 kit 0    Blood Glucose Monitoring Suppl (ACCU-CHEK GUIDE ME) w/Device KIT USE TO TEST BLOOD SUGAR 1 TIMES DAILY      Cholecalciferol (VITAMIN D3) 988612 UNIT/GM POWD Take 100 mcg by mouth      colchicine (COLCRYS) 0.6 MG tablet       COSENTYX SENSOREADY  MG/ML Sensoready pen 150 mg once every six weeks.      ferrous sulfate (FE TABS) 325 (65 Fe) MG EC tablet Take 325 mg by mouth daily.      Multiple Vitamin (MULTI VITAMIN) TABS Take 1 tablet by mouth daily      omeprazole (PRILOSEC) 40 MG capsule [OMEPRAZOLE (PRILOSEC) 40 MG CAPSULE] Take 40 mg by mouth 2 (two) times a day before meals.      predniSONE (DELTASONE) 5 MG tablet Take as directed. 50 tablet 0    thin (NO BRAND SPECIFIED) lancets Use with lanceting device. To accompany: Blood Glucose Monitor Brands: per insurance. 100 each 6    Turmeric 400 MG CAPS TURMERIC 1/day      vitamin C 250 MG tablet Take 250 mg by mouth.       No current facility-administered medications for this visit.       Objective    BP (!) 154/82 (BP Location: Right arm, Patient Position: Sitting, Cuff Size: Adult Regular)   Pulse 80   Temp 97.3  F (36.3  C) (Tympanic)   Resp 16   Ht 1.778 m (5' 10\")   Wt 97.1 kg (214 lb)   SpO2 97%   " BMI 30.71 kg/m    Physical Exam   Patient is in no acute distress and appears well.  Lungs CTA  Heart RRR  No costovertebral angle tenderness is present.  Abdomen is soft nontender to light or deep palpation no masses or hepatosplenomegaly present.  Results for orders placed or performed in visit on 06/29/25   UA Macroscopic with reflex to Microscopic and Culture - Lab Collect     Status: Abnormal    Specimen: Urine, Midstream   Result Value Ref Range    Color Urine Yellow Colorless, Straw, Light Yellow, Yellow    Appearance Urine Clear Clear    Glucose Urine Negative Negative mg/dL    Bilirubin Urine Negative Negative    Ketones Urine Trace (A) Negative mg/dL    Specific Gravity Urine 1.025 1.005 - 1.030    Blood Urine Negative Negative    pH Urine 5.5 5.0 - 8.0    Protein Albumin Urine Negative Negative mg/dL    Urobilinogen Urine 0.2 0.2, 1.0 E.U./dL    Nitrite Urine Negative Negative    Leukocyte Esterase Urine Negative Negative    Narrative    Microscopic not indicated

## 2025-06-29 NOTE — PROGRESS NOTES
Assessment & Plan     Urinary frequency  Patient was seen for 2 day history of urinary frequency and some pressure .  He has no abdominal pain fevers nausea vomiting or significant back pain.  UA is not suggestive of UTI.  No glucosuria is noted.  Reassurance provided to patient that he does not have a UTI.  His symptoms do not suggest prostatitis as he has no restrictive voiding symptoms.  I do recommend he monitor his blood sugars at home.  He does have trace ketones in his urine encouraged high fluid intake.  Nothing on today's exam or history suggests that his surgery would need to be delayed however if anything changes between today and tomorrow he should discuss with his anesthesiologist and general surgeon.  - UA Macroscopic with reflex to Microscopic and Culture - Lab Collect        Crystal Molina PA-C  [facility] URGENT CARE [facility]     Rigoberto is a 55 year old male who presents to clinic today for the following health issues:  Chief Complaint   Patient presents with    Urgent Care     UTI x 2 days        HPI  Pt is a 55 year old male who presents with concern re: possible uti.    Hx of bladder cancer, diabetes, colon mass with pending surgery tomorrow, immune supression on cosentyx.  Not taking prednisone.    Frequency: pressure in the lower abdomen. Urinating a few extra times per day, no dysuria. No urgency. No difficulty starting stream. No hematuria.     No scrotal masses or pain.      No nausea, vomiting.  Some ache in the lower abdomen and back.    Surgery tomorrow for colon mass with planned colectomy.    BS: does not monitor at home.    Bladder: no recurrance.        Review of Systems  Constitutional, HEENT, cardiovascular, pulmonary, gi and gu systems are negative, except as otherwise noted.      Patient Active Problem List   Diagnosis    Precordial pain    Articular gout    Chronic prostatitis    Gastric polyp    Immunosuppressed status    Arthritis    Inflammatory  "polyarthritis (H)    Malignant neoplasm of bladder (H)    Pain, joint, multiple sites    Patellofemoral syndrome, left    Proctalgia    Psoriasis with arthropathy (H)    Adenomatous polyp of colon     Current Outpatient Medications   Medication Sig Dispense Refill    allopurinol (ZYLOPRIM) 100 MG tablet Take 2 tablets (200 mg) by mouth daily. 180 tablet 1    blood glucose (NO BRAND SPECIFIED) test strip Use to test blood sugar 1 times daily or as directed. To accompany: Blood Glucose Monitor Brands: per insurance. 100 strip 6    blood glucose monitoring (NO BRAND SPECIFIED) meter device kit Use to test blood sugar 1 times daily or as directed. Preferred blood glucose meter OR supplies to accompany: Blood Glucose Monitor Brands: per insurance. 1 kit 0    Blood Glucose Monitoring Suppl (ACCU-CHEK GUIDE ME) w/Device KIT USE TO TEST BLOOD SUGAR 1 TIMES DAILY      Cholecalciferol (VITAMIN D3) 713807 UNIT/GM POWD Take 100 mcg by mouth      colchicine (COLCRYS) 0.6 MG tablet       COSENTYX SENSOREADY  MG/ML Sensoready pen 150 mg once every six weeks.      ferrous sulfate (FE TABS) 325 (65 Fe) MG EC tablet Take 325 mg by mouth daily.      Multiple Vitamin (MULTI VITAMIN) TABS Take 1 tablet by mouth daily      omeprazole (PRILOSEC) 40 MG capsule [OMEPRAZOLE (PRILOSEC) 40 MG CAPSULE] Take 40 mg by mouth 2 (two) times a day before meals.      predniSONE (DELTASONE) 5 MG tablet Take as directed. 50 tablet 0    thin (NO BRAND SPECIFIED) lancets Use with lanceting device. To accompany: Blood Glucose Monitor Brands: per insurance. 100 each 6    Turmeric 400 MG CAPS TURMERIC 1/day      vitamin C 250 MG tablet Take 250 mg by mouth.       No current facility-administered medications for this visit.       Objective    BP (!) 154/82 (BP Location: Right arm, Patient Position: Sitting, Cuff Size: Adult Regular)   Pulse 80   Temp 97.3  F (36.3  C) (Tympanic)   Resp 16   Ht 1.778 m (5' 10\")   Wt 97.1 kg (214 lb)   SpO2 97%   " BMI 30.71 kg/m    Physical Exam   Patient is in no acute distress and appears well.  Lungs CTA  Heart RRR  No costovertebral angle tenderness is present.  Abdomen is soft nontender to light or deep palpation no masses or hepatosplenomegaly present.  Results for orders placed or performed in visit on 06/29/25   UA Macroscopic with reflex to Microscopic and Culture - Lab Collect     Status: Abnormal    Specimen: Urine, Midstream   Result Value Ref Range    Color Urine Yellow Colorless, Straw, Light Yellow, Yellow    Appearance Urine Clear Clear    Glucose Urine Negative Negative mg/dL    Bilirubin Urine Negative Negative    Ketones Urine Trace (A) Negative mg/dL    Specific Gravity Urine 1.025 1.005 - 1.030    Blood Urine Negative Negative    pH Urine 5.5 5.0 - 8.0    Protein Albumin Urine Negative Negative mg/dL    Urobilinogen Urine 0.2 0.2, 1.0 E.U./dL    Nitrite Urine Negative Negative    Leukocyte Esterase Urine Negative Negative    Narrative    Microscopic not indicated

## 2025-06-29 NOTE — PATIENT INSTRUCTIONS
Push fluids and monitor blood sugars.    Follow up for any persistent or worsening symptoms.

## 2025-06-30 ENCOUNTER — HOSPITAL ENCOUNTER (INPATIENT)
Facility: HOSPITAL | Age: 55
LOS: 3 days | Discharge: HOME | End: 2025-07-03
Attending: COLON & RECTAL SURGERY | Admitting: COLON & RECTAL SURGERY
Payer: COMMERCIAL

## 2025-06-30 ENCOUNTER — ANESTHESIA (OUTPATIENT)
Dept: SURGERY | Facility: HOSPITAL | Age: 55
End: 2025-06-30
Payer: COMMERCIAL

## 2025-06-30 DIAGNOSIS — C18.7 MALIGNANT NEOPLASM OF SIGMOID COLON (H): Primary | ICD-10-CM

## 2025-06-30 PROBLEM — C18.9 COLON CANCER (H): Status: ACTIVE | Noted: 2025-06-30

## 2025-06-30 LAB
ABO + RH BLD: NORMAL
BLD GP AB SCN SERPL QL: NEGATIVE
GLUCOSE BLDC GLUCOMTR-MCNC: 137 MG/DL (ref 70–99)
GLUCOSE BLDC GLUCOMTR-MCNC: 149 MG/DL (ref 70–99)
GLUCOSE BLDC GLUCOMTR-MCNC: 184 MG/DL (ref 70–99)
GLUCOSE BLDC GLUCOMTR-MCNC: 188 MG/DL (ref 70–99)
GLUCOSE BLDC GLUCOMTR-MCNC: 195 MG/DL (ref 70–99)
HGB BLD-MCNC: 13.4 G/DL (ref 13.3–17.7)
HOLD SPECIMEN: NORMAL
MCV RBC AUTO: 74 FL (ref 78–100)
SPECIMEN EXP DATE BLD: NORMAL

## 2025-06-30 PROCEDURE — 250N000012 HC RX MED GY IP 250 OP 636 PS 637: Performed by: STUDENT IN AN ORGANIZED HEALTH CARE EDUCATION/TRAINING PROGRAM

## 2025-06-30 PROCEDURE — 88309 TISSUE EXAM BY PATHOLOGIST: CPT | Mod: TC | Performed by: COLON & RECTAL SURGERY

## 2025-06-30 PROCEDURE — 88304 TISSUE EXAM BY PATHOLOGIST: CPT | Mod: TC | Performed by: COLON & RECTAL SURGERY

## 2025-06-30 PROCEDURE — 272N000001 HC OR GENERAL SUPPLY STERILE: Performed by: COLON & RECTAL SURGERY

## 2025-06-30 PROCEDURE — 258N000003 HC RX IP 258 OP 636: Performed by: COLON & RECTAL SURGERY

## 2025-06-30 PROCEDURE — 85018 HEMOGLOBIN: CPT

## 2025-06-30 PROCEDURE — 360N000080 HC SURGERY LEVEL 7, PER MIN: Performed by: COLON & RECTAL SURGERY

## 2025-06-30 PROCEDURE — 88309 TISSUE EXAM BY PATHOLOGIST: CPT | Mod: 26 | Performed by: PATHOLOGY

## 2025-06-30 PROCEDURE — 250N000009 HC RX 250: Performed by: ANESTHESIOLOGY

## 2025-06-30 PROCEDURE — 8E0W4CZ ROBOTIC ASSISTED PROCEDURE OF TRUNK REGION, PERCUTANEOUS ENDOSCOPIC APPROACH: ICD-10-PCS | Performed by: COLON & RECTAL SURGERY

## 2025-06-30 PROCEDURE — 0DTN4ZZ RESECTION OF SIGMOID COLON, PERCUTANEOUS ENDOSCOPIC APPROACH: ICD-10-PCS | Performed by: COLON & RECTAL SURGERY

## 2025-06-30 PROCEDURE — 250N000011 HC RX IP 250 OP 636

## 2025-06-30 PROCEDURE — 250N000011 HC RX IP 250 OP 636: Performed by: ANESTHESIOLOGY

## 2025-06-30 PROCEDURE — 258N000003 HC RX IP 258 OP 636: Performed by: STUDENT IN AN ORGANIZED HEALTH CARE EDUCATION/TRAINING PROGRAM

## 2025-06-30 PROCEDURE — 250N000013 HC RX MED GY IP 250 OP 250 PS 637

## 2025-06-30 PROCEDURE — 36415 COLL VENOUS BLD VENIPUNCTURE: CPT

## 2025-06-30 PROCEDURE — 250N000011 HC RX IP 250 OP 636: Performed by: COLON & RECTAL SURGERY

## 2025-06-30 PROCEDURE — 250N000013 HC RX MED GY IP 250 OP 250 PS 637: Performed by: COLON & RECTAL SURGERY

## 2025-06-30 PROCEDURE — 250N000009 HC RX 250: Performed by: COLON & RECTAL SURGERY

## 2025-06-30 PROCEDURE — 250N000011 HC RX IP 250 OP 636: Mod: JZ | Performed by: STUDENT IN AN ORGANIZED HEALTH CARE EDUCATION/TRAINING PROGRAM

## 2025-06-30 PROCEDURE — 86850 RBC ANTIBODY SCREEN: CPT

## 2025-06-30 PROCEDURE — 250N000025 HC SEVOFLURANE, PER MIN: Performed by: COLON & RECTAL SURGERY

## 2025-06-30 PROCEDURE — 0DJD8ZZ INSPECTION OF LOWER INTESTINAL TRACT, VIA NATURAL OR ARTIFICIAL OPENING ENDOSCOPIC: ICD-10-PCS | Performed by: COLON & RECTAL SURGERY

## 2025-06-30 PROCEDURE — 250N000013 HC RX MED GY IP 250 OP 250 PS 637: Performed by: STUDENT IN AN ORGANIZED HEALTH CARE EDUCATION/TRAINING PROGRAM

## 2025-06-30 PROCEDURE — 258N000003 HC RX IP 258 OP 636: Performed by: ANESTHESIOLOGY

## 2025-06-30 PROCEDURE — 999N000141 HC STATISTIC PRE-PROCEDURE NURSING ASSESSMENT: Performed by: COLON & RECTAL SURGERY

## 2025-06-30 PROCEDURE — 370N000017 HC ANESTHESIA TECHNICAL FEE, PER MIN: Performed by: COLON & RECTAL SURGERY

## 2025-06-30 PROCEDURE — 86900 BLOOD TYPING SEROLOGIC ABO: CPT

## 2025-06-30 PROCEDURE — 710N000009 HC RECOVERY PHASE 1, LEVEL 1, PER MIN: Performed by: COLON & RECTAL SURGERY

## 2025-06-30 PROCEDURE — 120N000001 HC R&B MED SURG/OB

## 2025-06-30 PROCEDURE — 88304 TISSUE EXAM BY PATHOLOGIST: CPT | Mod: 26 | Performed by: PATHOLOGY

## 2025-06-30 PROCEDURE — 250N000011 HC RX IP 250 OP 636: Mod: JZ | Performed by: ANESTHESIOLOGY

## 2025-06-30 RX ORDER — LABETALOL HYDROCHLORIDE 5 MG/ML
INJECTION, SOLUTION INTRAVENOUS PRN
Status: DISCONTINUED | OUTPATIENT
Start: 2025-06-30 | End: 2025-06-30

## 2025-06-30 RX ORDER — HYDROMORPHONE HCL IN WATER/PF 6 MG/30 ML
0.4 PATIENT CONTROLLED ANALGESIA SYRINGE INTRAVENOUS
Status: DISCONTINUED | OUTPATIENT
Start: 2025-06-30 | End: 2025-07-03 | Stop reason: HOSPADM

## 2025-06-30 RX ORDER — ONDANSETRON 2 MG/ML
INJECTION INTRAMUSCULAR; INTRAVENOUS PRN
Status: DISCONTINUED | OUTPATIENT
Start: 2025-06-30 | End: 2025-06-30

## 2025-06-30 RX ORDER — NALOXONE HYDROCHLORIDE 0.4 MG/ML
0.1 INJECTION, SOLUTION INTRAMUSCULAR; INTRAVENOUS; SUBCUTANEOUS
Status: DISCONTINUED | OUTPATIENT
Start: 2025-06-30 | End: 2025-06-30 | Stop reason: HOSPADM

## 2025-06-30 RX ORDER — CEFAZOLIN SODIUM/WATER 2 G/20 ML
2 SYRINGE (ML) INTRAVENOUS
Status: COMPLETED | OUTPATIENT
Start: 2025-06-30 | End: 2025-06-30

## 2025-06-30 RX ORDER — INDOCYANINE GREEN AND WATER 25 MG
KIT INJECTION PRN
Status: DISCONTINUED | OUTPATIENT
Start: 2025-06-30 | End: 2025-06-30

## 2025-06-30 RX ORDER — HEPARIN SODIUM 1000 [USP'U]/ML
INJECTION, SOLUTION INTRAVENOUS; SUBCUTANEOUS PRN
Status: DISCONTINUED | OUTPATIENT
Start: 2025-06-30 | End: 2025-06-30

## 2025-06-30 RX ORDER — CHLORAL HYDRATE 500 MG
1 CAPSULE ORAL DAILY
COMMUNITY

## 2025-06-30 RX ORDER — BUPIVACAINE HYDROCHLORIDE 2.5 MG/ML
INJECTION, SOLUTION EPIDURAL; INFILTRATION; INTRACAUDAL; PERINEURAL
Status: DISCONTINUED | OUTPATIENT
Start: 2025-06-30 | End: 2025-06-30

## 2025-06-30 RX ORDER — SIMETHICONE 80 MG
80 TABLET,CHEWABLE ORAL 4 TIMES DAILY PRN
Status: DISCONTINUED | OUTPATIENT
Start: 2025-06-30 | End: 2025-07-03 | Stop reason: HOSPADM

## 2025-06-30 RX ORDER — FENTANYL CITRATE 50 UG/ML
50 INJECTION, SOLUTION INTRAMUSCULAR; INTRAVENOUS EVERY 5 MIN PRN
Status: DISCONTINUED | OUTPATIENT
Start: 2025-06-30 | End: 2025-06-30 | Stop reason: HOSPADM

## 2025-06-30 RX ORDER — NALOXONE HYDROCHLORIDE 0.4 MG/ML
0.4 INJECTION, SOLUTION INTRAMUSCULAR; INTRAVENOUS; SUBCUTANEOUS
Status: DISCONTINUED | OUTPATIENT
Start: 2025-06-30 | End: 2025-07-03 | Stop reason: HOSPADM

## 2025-06-30 RX ORDER — HYDROMORPHONE HCL IN WATER/PF 6 MG/30 ML
0.2 PATIENT CONTROLLED ANALGESIA SYRINGE INTRAVENOUS
Status: DISCONTINUED | OUTPATIENT
Start: 2025-06-30 | End: 2025-07-03 | Stop reason: HOSPADM

## 2025-06-30 RX ORDER — DEXAMETHASONE SODIUM PHOSPHATE 10 MG/ML
INJECTION, SOLUTION INTRAMUSCULAR; INTRAVENOUS PRN
Status: DISCONTINUED | OUTPATIENT
Start: 2025-06-30 | End: 2025-06-30

## 2025-06-30 RX ORDER — OMEPRAZOLE 20 MG/1
40 CAPSULE, DELAYED RELEASE ORAL DAILY
Status: DISCONTINUED | OUTPATIENT
Start: 2025-06-30 | End: 2025-06-30

## 2025-06-30 RX ORDER — HYDRALAZINE HYDROCHLORIDE 20 MG/ML
5 INJECTION INTRAMUSCULAR; INTRAVENOUS 2 TIMES DAILY PRN
Status: COMPLETED | OUTPATIENT
Start: 2025-06-30 | End: 2025-06-30

## 2025-06-30 RX ORDER — CALCIUM CARBONATE 500 MG/1
1000 TABLET, CHEWABLE ORAL 4 TIMES DAILY PRN
Status: DISCONTINUED | OUTPATIENT
Start: 2025-06-30 | End: 2025-07-03 | Stop reason: HOSPADM

## 2025-06-30 RX ORDER — FENTANYL CITRATE 50 UG/ML
INJECTION, SOLUTION INTRAMUSCULAR; INTRAVENOUS PRN
Status: DISCONTINUED | OUTPATIENT
Start: 2025-06-30 | End: 2025-06-30

## 2025-06-30 RX ORDER — ACETAMINOPHEN 500 MG
1000 TABLET ORAL EVERY 6 HOURS
Status: DISCONTINUED | OUTPATIENT
Start: 2025-06-30 | End: 2025-07-03 | Stop reason: HOSPADM

## 2025-06-30 RX ORDER — PROPOFOL 10 MG/ML
INJECTION, EMULSION INTRAVENOUS PRN
Status: DISCONTINUED | OUTPATIENT
Start: 2025-06-30 | End: 2025-06-30

## 2025-06-30 RX ORDER — ONDANSETRON 2 MG/ML
4 INJECTION INTRAMUSCULAR; INTRAVENOUS EVERY 6 HOURS PRN
Status: DISCONTINUED | OUTPATIENT
Start: 2025-06-30 | End: 2025-07-03 | Stop reason: HOSPADM

## 2025-06-30 RX ORDER — LIDOCAINE 40 MG/G
CREAM TOPICAL
Status: DISCONTINUED | OUTPATIENT
Start: 2025-06-30 | End: 2025-06-30 | Stop reason: HOSPADM

## 2025-06-30 RX ORDER — BUPIVACAINE HYDROCHLORIDE 2.5 MG/ML
INJECTION, SOLUTION INFILTRATION; PERINEURAL PRN
Status: DISCONTINUED | OUTPATIENT
Start: 2025-06-30 | End: 2025-06-30 | Stop reason: HOSPADM

## 2025-06-30 RX ORDER — PROCHLORPERAZINE MALEATE 10 MG
10 TABLET ORAL EVERY 6 HOURS PRN
Status: DISCONTINUED | OUTPATIENT
Start: 2025-06-30 | End: 2025-07-03 | Stop reason: HOSPADM

## 2025-06-30 RX ORDER — CHOLECALCIFEROL (VITAMIN D3) 50 MCG
1 TABLET ORAL DAILY
COMMUNITY

## 2025-06-30 RX ORDER — NICOTINE POLACRILEX 4 MG
15-30 LOZENGE BUCCAL
Status: DISCONTINUED | OUTPATIENT
Start: 2025-06-30 | End: 2025-07-03 | Stop reason: HOSPADM

## 2025-06-30 RX ORDER — NALOXONE HYDROCHLORIDE 0.4 MG/ML
0.2 INJECTION, SOLUTION INTRAMUSCULAR; INTRAVENOUS; SUBCUTANEOUS
Status: DISCONTINUED | OUTPATIENT
Start: 2025-06-30 | End: 2025-07-03 | Stop reason: HOSPADM

## 2025-06-30 RX ORDER — ENOXAPARIN SODIUM 100 MG/ML
40 INJECTION SUBCUTANEOUS EVERY 24 HOURS
Status: DISCONTINUED | OUTPATIENT
Start: 2025-07-01 | End: 2025-07-03 | Stop reason: HOSPADM

## 2025-06-30 RX ORDER — ONDANSETRON 2 MG/ML
4 INJECTION INTRAMUSCULAR; INTRAVENOUS EVERY 30 MIN PRN
Status: DISCONTINUED | OUTPATIENT
Start: 2025-06-30 | End: 2025-06-30 | Stop reason: HOSPADM

## 2025-06-30 RX ORDER — GABAPENTIN 100 MG/1
100 CAPSULE ORAL 3 TIMES DAILY
Status: DISCONTINUED | OUTPATIENT
Start: 2025-06-30 | End: 2025-07-03 | Stop reason: HOSPADM

## 2025-06-30 RX ORDER — SODIUM CHLORIDE, SODIUM LACTATE, POTASSIUM CHLORIDE, AND CALCIUM CHLORIDE .6; .31; .03; .02 G/100ML; G/100ML; G/100ML; G/100ML
IRRIGANT IRRIGATION PRN
Status: DISCONTINUED | OUTPATIENT
Start: 2025-06-30 | End: 2025-06-30 | Stop reason: HOSPADM

## 2025-06-30 RX ORDER — ACETAMINOPHEN 325 MG/1
975 TABLET ORAL ONCE
Status: COMPLETED | OUTPATIENT
Start: 2025-06-30 | End: 2025-06-30

## 2025-06-30 RX ORDER — HEPARIN SODIUM 5000 [USP'U]/.5ML
5000 INJECTION, SOLUTION INTRAVENOUS; SUBCUTANEOUS ONCE
Status: COMPLETED | OUTPATIENT
Start: 2025-06-30 | End: 2025-06-30

## 2025-06-30 RX ORDER — METRONIDAZOLE 500 MG/100ML
500 INJECTION, SOLUTION INTRAVENOUS
Status: COMPLETED | OUTPATIENT
Start: 2025-06-30 | End: 2025-06-30

## 2025-06-30 RX ORDER — PANTOPRAZOLE SODIUM 40 MG/1
40 TABLET, DELAYED RELEASE ORAL
Status: DISCONTINUED | OUTPATIENT
Start: 2025-06-30 | End: 2025-07-03 | Stop reason: HOSPADM

## 2025-06-30 RX ORDER — MAGNESIUM HYDROXIDE 1200 MG/15ML
LIQUID ORAL PRN
Status: DISCONTINUED | OUTPATIENT
Start: 2025-06-30 | End: 2025-06-30 | Stop reason: HOSPADM

## 2025-06-30 RX ORDER — SODIUM CHLORIDE, SODIUM LACTATE, POTASSIUM CHLORIDE, CALCIUM CHLORIDE 600; 310; 30; 20 MG/100ML; MG/100ML; MG/100ML; MG/100ML
INJECTION, SOLUTION INTRAVENOUS CONTINUOUS
Status: DISCONTINUED | OUTPATIENT
Start: 2025-06-30 | End: 2025-06-30 | Stop reason: HOSPADM

## 2025-06-30 RX ORDER — HEPARIN SODIUM 5000 [USP'U]/.5ML
5000 INJECTION, SOLUTION INTRAVENOUS; SUBCUTANEOUS
Status: DISCONTINUED | OUTPATIENT
Start: 2025-06-30 | End: 2025-06-30 | Stop reason: HOSPADM

## 2025-06-30 RX ORDER — LIDOCAINE HYDROCHLORIDE 10 MG/ML
INJECTION, SOLUTION INFILTRATION; PERINEURAL PRN
Status: DISCONTINUED | OUTPATIENT
Start: 2025-06-30 | End: 2025-06-30

## 2025-06-30 RX ORDER — SODIUM CHLORIDE, SODIUM LACTATE, POTASSIUM CHLORIDE, CALCIUM CHLORIDE 600; 310; 30; 20 MG/100ML; MG/100ML; MG/100ML; MG/100ML
INJECTION, SOLUTION INTRAVENOUS CONTINUOUS
Status: DISCONTINUED | OUTPATIENT
Start: 2025-06-30 | End: 2025-07-01

## 2025-06-30 RX ORDER — FENTANYL CITRATE 50 UG/ML
25 INJECTION, SOLUTION INTRAMUSCULAR; INTRAVENOUS EVERY 5 MIN PRN
Status: DISCONTINUED | OUTPATIENT
Start: 2025-06-30 | End: 2025-06-30 | Stop reason: HOSPADM

## 2025-06-30 RX ORDER — ONDANSETRON 4 MG/1
4 TABLET, ORALLY DISINTEGRATING ORAL EVERY 6 HOURS PRN
Status: DISCONTINUED | OUTPATIENT
Start: 2025-06-30 | End: 2025-07-03 | Stop reason: HOSPADM

## 2025-06-30 RX ORDER — CEFAZOLIN SODIUM/WATER 2 G/20 ML
2 SYRINGE (ML) INTRAVENOUS SEE ADMIN INSTRUCTIONS
Status: DISCONTINUED | OUTPATIENT
Start: 2025-06-30 | End: 2025-06-30 | Stop reason: HOSPADM

## 2025-06-30 RX ORDER — ONDANSETRON 4 MG/1
4 TABLET, ORALLY DISINTEGRATING ORAL EVERY 30 MIN PRN
Status: DISCONTINUED | OUTPATIENT
Start: 2025-06-30 | End: 2025-06-30 | Stop reason: HOSPADM

## 2025-06-30 RX ORDER — HYDROMORPHONE HCL IN WATER/PF 6 MG/30 ML
0.4 PATIENT CONTROLLED ANALGESIA SYRINGE INTRAVENOUS EVERY 5 MIN PRN
Status: DISCONTINUED | OUTPATIENT
Start: 2025-06-30 | End: 2025-06-30 | Stop reason: HOSPADM

## 2025-06-30 RX ORDER — DEXAMETHASONE SODIUM PHOSPHATE 4 MG/ML
4 INJECTION, SOLUTION INTRA-ARTICULAR; INTRALESIONAL; INTRAMUSCULAR; INTRAVENOUS; SOFT TISSUE
Status: DISCONTINUED | OUTPATIENT
Start: 2025-06-30 | End: 2025-06-30 | Stop reason: HOSPADM

## 2025-06-30 RX ORDER — DEXTROSE MONOHYDRATE 25 G/50ML
25-50 INJECTION, SOLUTION INTRAVENOUS
Status: DISCONTINUED | OUTPATIENT
Start: 2025-06-30 | End: 2025-07-03 | Stop reason: HOSPADM

## 2025-06-30 RX ORDER — HYDROMORPHONE HCL IN WATER/PF 6 MG/30 ML
0.2 PATIENT CONTROLLED ANALGESIA SYRINGE INTRAVENOUS EVERY 5 MIN PRN
Status: DISCONTINUED | OUTPATIENT
Start: 2025-06-30 | End: 2025-06-30 | Stop reason: HOSPADM

## 2025-06-30 RX ORDER — ONDANSETRON 2 MG/ML
4 INJECTION INTRAMUSCULAR; INTRAVENOUS ONCE
Status: COMPLETED | OUTPATIENT
Start: 2025-06-30 | End: 2025-06-30

## 2025-06-30 RX ORDER — LIDOCAINE 40 MG/G
CREAM TOPICAL
Status: DISCONTINUED | OUTPATIENT
Start: 2025-06-30 | End: 2025-07-03 | Stop reason: HOSPADM

## 2025-06-30 RX ADMIN — HYDROMORPHONE HYDROCHLORIDE 0.2 MG: 0.2 INJECTION, SOLUTION INTRAMUSCULAR; INTRAVENOUS; SUBCUTANEOUS at 23:02

## 2025-06-30 RX ADMIN — Medication 2 G: at 07:30

## 2025-06-30 RX ADMIN — ROCURONIUM 10 MG: 50 INJECTION, SOLUTION INTRAVENOUS at 10:24

## 2025-06-30 RX ADMIN — LABETALOL HYDROCHLORIDE 5 MG: 5 INJECTION, SOLUTION INTRAVENOUS at 09:38

## 2025-06-30 RX ADMIN — ROCURONIUM 70 MG: 50 INJECTION, SOLUTION INTRAVENOUS at 07:36

## 2025-06-30 RX ADMIN — SUGAMMADEX 200 MG: 100 INJECTION, SOLUTION INTRAVENOUS at 12:16

## 2025-06-30 RX ADMIN — PANTOPRAZOLE SODIUM 40 MG: 40 TABLET, DELAYED RELEASE ORAL at 16:06

## 2025-06-30 RX ADMIN — HYDROMORPHONE HYDROCHLORIDE 0.4 MG: 0.2 INJECTION, SOLUTION INTRAMUSCULAR; INTRAVENOUS; SUBCUTANEOUS at 19:49

## 2025-06-30 RX ADMIN — MIDAZOLAM HYDROCHLORIDE 2 MG: 1 INJECTION, SOLUTION INTRAMUSCULAR; INTRAVENOUS at 07:30

## 2025-06-30 RX ADMIN — Medication 2 G: at 11:05

## 2025-06-30 RX ADMIN — HEPARIN SODIUM 5000 UNITS: 5000 INJECTION, SOLUTION INTRAVENOUS; SUBCUTANEOUS at 07:28

## 2025-06-30 RX ADMIN — HYDROMORPHONE HYDROCHLORIDE 1 MG: 1 INJECTION, SOLUTION INTRAMUSCULAR; INTRAVENOUS; SUBCUTANEOUS at 08:34

## 2025-06-30 RX ADMIN — ACETAMINOPHEN 1000 MG: 500 TABLET ORAL at 21:11

## 2025-06-30 RX ADMIN — BUPIVACAINE 15 ML: 13.3 INJECTION, SUSPENSION, LIPOSOMAL INFILTRATION at 12:29

## 2025-06-30 RX ADMIN — FENTANYL CITRATE 100 MCG: 50 INJECTION, SOLUTION INTRAMUSCULAR; INTRAVENOUS at 07:36

## 2025-06-30 RX ADMIN — ROCURONIUM 30 MG: 50 INJECTION, SOLUTION INTRAVENOUS at 11:00

## 2025-06-30 RX ADMIN — ONDANSETRON 4 MG: 2 INJECTION INTRAMUSCULAR; INTRAVENOUS at 11:31

## 2025-06-30 RX ADMIN — PROPOFOL 170 MG: 10 INJECTION, EMULSION INTRAVENOUS at 07:36

## 2025-06-30 RX ADMIN — ROCURONIUM 30 MG: 50 INJECTION, SOLUTION INTRAVENOUS at 08:40

## 2025-06-30 RX ADMIN — METRONIDAZOLE 500 MG: 500 INJECTION, SOLUTION INTRAVENOUS at 06:34

## 2025-06-30 RX ADMIN — INDOCYANINE GREEN AND WATER 12.5 MG: KIT at 10:29

## 2025-06-30 RX ADMIN — ROCURONIUM 10 MG: 50 INJECTION, SOLUTION INTRAVENOUS at 10:11

## 2025-06-30 RX ADMIN — SODIUM CHLORIDE 8 MCG: 9 INJECTION, SOLUTION INTRAVENOUS at 11:24

## 2025-06-30 RX ADMIN — BUPIVACAINE HYDROCHLORIDE 10 ML: 2.5 INJECTION, SOLUTION EPIDURAL; INFILTRATION; INTRACAUDAL; PERINEURAL at 12:34

## 2025-06-30 RX ADMIN — LABETALOL HYDROCHLORIDE 10 MG: 5 INJECTION, SOLUTION INTRAVENOUS at 12:25

## 2025-06-30 RX ADMIN — INSULIN ASPART 2 UNITS: 100 INJECTION, SOLUTION INTRAVENOUS; SUBCUTANEOUS at 17:54

## 2025-06-30 RX ADMIN — SODIUM CHLORIDE, SODIUM LACTATE, POTASSIUM CHLORIDE, AND CALCIUM CHLORIDE: .6; .31; .03; .02 INJECTION, SOLUTION INTRAVENOUS at 06:34

## 2025-06-30 RX ADMIN — GABAPENTIN 100 MG: 100 CAPSULE ORAL at 17:26

## 2025-06-30 RX ADMIN — ONDANSETRON 4 MG: 2 INJECTION, SOLUTION INTRAMUSCULAR; INTRAVENOUS at 06:34

## 2025-06-30 RX ADMIN — SODIUM CHLORIDE 12 MCG: 9 INJECTION, SOLUTION INTRAVENOUS at 11:30

## 2025-06-30 RX ADMIN — LIDOCAINE HYDROCHLORIDE 5 ML: 10 INJECTION, SOLUTION INFILTRATION; PERINEURAL at 07:36

## 2025-06-30 RX ADMIN — ONDANSETRON 4 MG: 2 INJECTION, SOLUTION INTRAMUSCULAR; INTRAVENOUS at 15:42

## 2025-06-30 RX ADMIN — ACETAMINOPHEN 975 MG: 325 TABLET ORAL at 06:34

## 2025-06-30 RX ADMIN — HYDRALAZINE HYDROCHLORIDE 5 MG: 20 INJECTION INTRAMUSCULAR; INTRAVENOUS at 14:10

## 2025-06-30 RX ADMIN — BUPIVACAINE HYDROCHLORIDE 19 ML: 2.5 INJECTION, SOLUTION EPIDURAL; INFILTRATION; INTRACAUDAL; PERINEURAL at 02:27

## 2025-06-30 RX ADMIN — DEXAMETHASONE SODIUM PHOSPHATE 4 MG: 10 INJECTION, SOLUTION INTRAMUSCULAR; INTRAVENOUS at 07:44

## 2025-06-30 RX ADMIN — GABAPENTIN 100 MG: 100 CAPSULE ORAL at 21:11

## 2025-06-30 RX ADMIN — HYDRALAZINE HYDROCHLORIDE 5 MG: 20 INJECTION INTRAMUSCULAR; INTRAVENOUS at 13:48

## 2025-06-30 RX ADMIN — ACETAMINOPHEN 1000 MG: 500 TABLET ORAL at 16:06

## 2025-06-30 RX ADMIN — BUPIVACAINE 15 ML: 13.3 INJECTION, SUSPENSION, LIPOSOMAL INFILTRATION at 12:32

## 2025-06-30 RX ADMIN — HYDROMORPHONE HYDROCHLORIDE 0.4 MG: 0.2 INJECTION, SOLUTION INTRAMUSCULAR; INTRAVENOUS; SUBCUTANEOUS at 16:05

## 2025-06-30 ASSESSMENT — ACTIVITIES OF DAILY LIVING (ADL)
ADLS_ACUITY_SCORE: 18
ADLS_ACUITY_SCORE: 21
ADLS_ACUITY_SCORE: 21
ADLS_ACUITY_SCORE: 32
ADLS_ACUITY_SCORE: 21
ADLS_ACUITY_SCORE: 23
ADLS_ACUITY_SCORE: 21
ADLS_ACUITY_SCORE: 18
ADLS_ACUITY_SCORE: 18
ADLS_ACUITY_SCORE: 21
ADLS_ACUITY_SCORE: 23
ADLS_ACUITY_SCORE: 21
ADLS_ACUITY_SCORE: 18

## 2025-06-30 NOTE — ANESTHESIA PROCEDURE NOTES
Airway       Patient location during procedure: OR       Procedure Start/Stop Times: 6/30/2025 7:39 AM  Staff -        CRNA: Elia Baltazar APRN CRNA       Performed By: CRNAIndications and Patient Condition       Indications for airway management: elizabeth-procedural       Induction type:intravenous       Mask difficulty assessment: 2 - vent by mask + OA or adjuvant +/- NMBA    Final Airway Details       Final airway type: endotracheal airway       Successful airway: ETT - single  Endotracheal Airway Details        ETT size (mm): 8.0       Cuffed: yes       Successful intubation technique: direct laryngoscopy       DL Blade Type: MAC 4       Grade View of Cords: 1       Adjucts: stylet       Position: Right       Measured from: gums/teeth       Secured at (cm): 23       Bite block used: None    Post intubation assessment        Placement verified by: capnometry, equal breath sounds and chest rise        Number of attempts at approach: 1       Number of other approaches attempted: 0       Secured with: tape       Ease of procedure: easy       Dentition: Intact and Unchanged       Dental guard used and removed. Dental Guard Type: Standard White.    Medication(s) Administered   Medication Administration Time: 6/30/2025 7:39 AM

## 2025-06-30 NOTE — INTERVAL H&P NOTE
"I have reviewed the surgical (or preoperative) H&P that is linked to this encounter, and examined the patient. There are no significant changes    Clinical Conditions Present on Arrival:  Clinically Significant Risk Factors Present on Admission                       # Obesity: Estimated body mass index is 30.33 kg/m  as calculated from the following:    Height as of this encounter: 1.778 m (5' 10\").    Weight as of this encounter: 95.9 kg (211 lb 6.4 oz).     55M w/ colon mass. Plan for robotic sigmoid colectomy.    Jason Fajardo MD, CARRIE  [facility]  Office: [phone]  6/30/2025 7:13 AM     "

## 2025-06-30 NOTE — ANESTHESIA POSTPROCEDURE EVALUATION
Patient: Rigoberto Laird    Procedure: Procedure(s):  ROBOTIC COLECTOMY SIGMOID       Anesthesia Type:  General    Note:  Disposition: Inpatient   Postop Pain Control:             Sign Out: Well controlled pain   PONV: No   Neuro/Psych: Uneventful            Sign Out: Acceptable/Baseline neuro status   Airway/Respiratory: Uneventful            Sign Out: Acceptable/Baseline resp. status   CV/Hemodynamics: Uneventful            Sign Out: Acceptable CV status; No obvious hypovolemia; No obvious fluid overload   Other NRE: NONE   DID A NON-ROUTINE EVENT OCCUR? No           Last vitals:  Vitals Value Taken Time   /83 06/30/25 12:50   Temp 36.7  C (98  F) 06/30/25 12:45   Pulse 83 06/30/25 12:53   Resp 13 06/30/25 12:53   SpO2 99 % 06/30/25 12:53   Vitals shown include unfiled device data.    Electronically Signed By: Ladi Callahan MD  June 30, 2025  12:54 PM

## 2025-06-30 NOTE — PROVIDER NOTIFICATION
Dr. Callahan notified of patient's initial blood sugar being in 190s. Patient does not take medication at home or use insulin baseline. Per Dr. Callahan, check one more blood sugar upon report to P2, to make sure blood sugar is trending down. Dr. Callahan also notified of patient's blood pressure continuing to be in 170s. PRN Hydralazine given x2 for SBP greater than 170. Report given to P2 nurse with interventions noted.

## 2025-06-30 NOTE — OP NOTE
COLORECTAL SURGERY OPERATIVE NOTE    Date of Service: 6/30/2025    Pre-Operative Diagnosis:   Sigmoid colon cancer (adenocarcinoma)  Type II DM  Current nicotine/tobacco use  Class I obesity (BMI >30)  History of bladder cancer (low grade urothelial cancer)  Chronic prostatitis  Psoriatic arthritis on biologic therapy  Pelvic floor dysfunction    Post-Operative Diagnosis:   Sigmoid colon cancer (adenocarcinoma)  Type II DM  Current nicotine/tobacco use  Class I obesity (BMI >30)  History of bladder cancer (low grade urothelial cancer)  Chronic prostatitis  Psoriatic arthritis on biologic therapy  Pelvic floor dysfunction    Procedure: Robotic sigmoid colectomy (29 EEA)    Surgeon: Jason Fajardo MD    Assistant: Georgia Boland PA-C    Anesthesia: General     Estimated Blood Loss: 25 mL    Bowel Prep: Mechanical and Antibiotic    Indication: The patient is a 55 year old male with a history of sigmoid colon cancer.  We discussed the the procedure of robotic assisted sigmoid colectomy (possible open). Risks outlined include bleeding, infection (anastomotic leak <5%), and damage to surrounding structures including the small bowel and ureter. Please see my office note for the details of our discussion.      Findings: No evidence of metastatic disease in the liver, peritoneal surfaces, or pelvis.  Tattoo seen in the mid sigmoid colon.  Standard sigmoid colectomy performed.  Leak test and negative.    Colon Resection  Operation performed with curative intent Yes   Tumor Location Sigmoid colon   Extent of colon and vascular resection Sigmoid resection - inferior mesenteric      Specimen:   A: Sigmoid colon   B: Anastomotic rings    Operative Details: After informed consent was obtained, the patient was brought to the operating room and placed in supine position on the operating room table. An intrathecal block was done in SACU. Sequential compression devices were placed on bilateral lower extremities prior to induction.   General anesthesia was induced without difficulty. 5000 units of subcutaneous heparin was given. The patient was placed in well-padded dorsal lithotomy position on a pink pad and IV antibiotics were administered per the ERAS protocol. A rectal washout with betadine was performed by myself. A peralta catheter was placed by the operating room nurse without difficulty. The patient's abdomen was sterilely prepped and draped in usual fashion. A timeout was performed per protocol.     We began with a Veress needle entry in the left upper quadrant. This needle was passed on the first attempt. The entry pressure was 3mmHg. The peritoneal cavity was insufflated to 15mmHg. An 8mm trochar was placed in the left mid-abdomen. The 8mm 30 degree robotic camera was placed.  There was no evidence of any intraperitoneal injury from the Veress needle, however there was some bleeding from the abdominal wall so I placed a 0 Vicryl figure-of-eight suture and this was hemostatic.    Two additional 8mm trochars were placed under direct visualization in a line from the right to the left mid-abdomen spacing approximately 8cm between trochars.  A 12 mm trocar was placed in the right lower quadrant and two 0 Vicryl stay sutures were placed in the fascia.  An 5mm AirSeal assistant port was placed in the RUQ. The patient was placed in 25 degrees Trendelenburg position with the left side down. Robotic trochars were placed in the following fashion: Arm 1: Tip up grasper, Arm 2: Fenestrated bipolar, Arm 3: Camera, Arm 4: Scissors.  The robot was then docked without difficulty.    The peritoneal cavity was inspected.  There were no unexpected findings.  Laparoscopic graspers were used to elevate the transverse colon and greater omentum can get the small bowel out of the pelvis.  Somewhat surprisingly, the small bowel was quite adherent to the underside of both the transverse colon as well as 2 other loops of small bowel.  Initially, I attempted to  start in the plane of the right sided rectosigmoid fossa to enter the plane between the mesentery and retroperitoneum.  However, the small bowel here was again tethered densely to the mesentery of the sigmoid colon as well as to the right pelvic sidewall.  All of these adhesions were taken down sharply.  I was then able to elevate the rectosigmoid area. Putting this on tension, we were able to enter the plane between the mesentery and retroperitoneum and continued this down to the TME plane.  Care was taken to avoid the hypogastric nerves as well as the ureters on the right side.  We continued our posterior dissection until the level of the peritoneal reflection.  We then completed transection of the lateral attachments to the top of the rectum.  We then began to work our way back along our previously dissected plane underneath the superior hemorrhoidal artery.  We traced this back to its origin at the inferior mesenteric artery.  The left ureter was identified and swept down out of harm's way.  A window was created around the ERNST.  This was then divided using the robotic energy device sealing twice on the patient's side, twice on the specimen side, and twice in between.  The stump of the ERNST was hemostatic. The was secured using an endoloop. We then continued up the descending colon in a medial to lateral fashion sweeping the retroperitoneum down.  We now turned our attention to the lateral dissection.    Again, we encountered some unusual adhesions from a more redundant portion of the sigmoid colon which was wrapping over the top of the more proximal colon and attached to the left abdominal pelvic sidewall.  This was all taken down sharply as well. We entered our previously dissected plane.  We continued this up towards the splenic flexure and then down into the pelvis.  Using our previous dissection in the posterior plane as a guide, we continued down the lateral side until the full dissection down to the very  top of the rectum was completed.  A window was then created in the mesorectum at our planned point of transection where the tinea had coalesced and there were no more epiploica.  The rectum was then divided using a single firing of the robotic 60 mm green load sure form stapler.  I could see that we had more than enough redundancy to do this anastomosis without mobilization of the splenic flexure.    A 6 cm Pfannenstiel incision was created.  This was taken down through the skin and subcutaneous tissues to the level of the fascia.  Fascial flaps were created.  The peritoneal cavity was entered.  This was opened over a gloved finger.  The bladder was swept down out of harm's way.  A medium Vitor wound protector was placed.  The colon and rectum were exteriorized.  A window was created around the soft and supple area of descending colon.  The marginal artery was identified and flashed between clamps.  There was brisk pulsatile blood flow.  This was then tied off using a 0 Vicryl tie as was the remainder of the mesentery.  Sterile blue towels were then placed down.  The colon was divided between a Kocher clamp and Sam clamp.  The mucosa appeared pink and healthy.  The anvil of the 29 mm EEA stapler was secured using a 2-0 Prolene pursestring.  The fat was cleared off.  The conduit was then dropped back into the peritoneal cavity and the abdomen was reinsufflated. Next, the EEA stapler was brought up through the same trajectory and fired out just anterior to the staple line.  The 2 ends of the stapler were mated with an audible and tactile click.  I ensured that the conduit was not twisted.  The stapler was then closed and fired without any intervening tissue.  The stapler was removed.  The anastomotic rings were confirmed to be intact on the back table.  The conduit was then occluded and a rigid proctoscopy was performed revealing air into the colorectal anastomosis.  This was held under warm saline to check for  bubbles.  The leak test was negative.  With our anastomosis concluded, we turned our attention to closure.  Sterile towels and the wound protector were removed.    We changed our gown and gloves in preparation for the clean closure tray.  All trochars were removed.  The peritoneal cavity was copiously irrigated until the effluent ran clean.  The 12mm port site was closed with the 2 interrupted 0 Vicryl sutures. The peritoneum was closed using 2 0 Vicryl figure-of-eight sutures.  The fascia was closed using 1 PDS running sutures.  All of the skin sites were closed with 4-0 Monocryl and all wounds were sealed with Dermabond.     At this point our planned procedure was completed. All sponge and instrument counts were correct x 2.    Complications: None    Disposition: Stable, extubated, to PACU    Jason Fajardo MD, CARRIE  [facility]  Office: [phone]  6/30/2025 7:50 AM

## 2025-06-30 NOTE — ANESTHESIA CARE TRANSFER NOTE
Patient: Rigoberto Laird    Procedure: Procedure(s):  ROBOTIC COLECTOMY SIGMOID       Diagnosis: Mass of colon [K63.89]  Diagnosis Additional Information: No value filed.    Anesthesia Type:   General     Note:    Oropharynx: oropharynx clear of all foreign objects and spontaneously breathing  Level of Consciousness: awake  Oxygen Supplementation: face mask  Level of Supplemental Oxygen (L/min / FiO2): 6  Independent Airway: airway patency satisfactory and stable  Dentition: dentition unchanged  Vital Signs Stable: post-procedure vital signs reviewed and stable  Report to RN Given: handoff report given  Patient transferred to: PACU    Handoff Report: Identifed the Patient, Identified the Reponsible Provider, Reviewed the pertinent medical history, Discussed the surgical course, Reviewed Intra-OP anesthesia mangement and issues during anesthesia, Set expectations for post-procedure period and Allowed opportunity for questions and acknowledgement of understanding      Vitals:  Vitals Value Taken Time   /85 06/30/25 12:22   Temp 36.6  C (97.8  F) 06/30/25 12:22   Pulse 85 06/30/25 12:30   Resp 17 06/30/25 12:30   SpO2 99 % 06/30/25 12:30   Vitals shown include unfiled device data.    Electronically Signed By: GILMA Jean CRNA  June 30, 2025  12:31 PM

## 2025-06-30 NOTE — ANESTHESIA PROCEDURE NOTES
TAP Procedure Note    Pre-Procedure   Staff -        Anesthesiologist:  Ladi Callahan MD       Performed By: anesthesiologist       Location: OR       Procedure Start/Stop Times: 6/30/2025 12:28 AM and 6/30/2025 12:35 AM       Pre-Anesthestic Checklist: patient identified, IV checked, site marked, risks and benefits discussed, informed consent, monitors and equipment checked, pre-op evaluation, at physician/surgeon's request and post-op pain management  Timeout:       Correct Patient: Yes        Correct Procedure: Yes        Correct Site: Yes        Correct Position: Yes        Correct Laterality: Yes        Site Marked: Yes  Procedure Documentation  Procedure: TAP         Laterality: bilateral       Patient Position: supine       Skin prep: Chloraprep       Needle Gauge: 21.        Needle Length (Inches): 6        Ultrasound guided       1. Ultrasound was used to identify targeted nerve, plexus, vascular marker, or fascial plane and place a needle adjacent to it in real-time.       2. Ultrasound was used to visualize the spread of anesthetic in close proximity to the above referenced structure.       3. A permanent image is entered into the patient's record.       4. The visualized anatomic structures appeared normal.       5. There were no apparent abnormal pathologic findings.    Assessment/Narrative         The placement was negative for: blood aspirated, painful injection and site bleeding       Paresthesias: No.       Bolus given via needle..        Secured via.     Medication(s) Administered   Bupivacaine 0.25% PF (Infiltration) - Infiltration   19 mL - 6/30/2025 2:27:00 AM   10 mL - 6/30/2025 12:34:00 PM  Bupivacaine liposome (Exparel) 1.3% LA inj susp (Infiltration) - Infiltration   15 mL - 6/30/2025 12:29:00 PM   15 mL - 6/30/2025 12:32:00 PM  Medication Administration Time: 6/30/2025 12:28 AM      FOR [facility] ONLY:   Pain Team Contact information: please page the Pain Team Via [employer].  "Search \"Pain\". During daytime hours, please page the attending first. At night please page the resident first.      "

## 2025-06-30 NOTE — BRIEF OP NOTE
[facility]    Brief Operative Note    Pre-operative diagnosis: Mass of colon [K63.89]  Post-operative diagnosis Same as pre-operative diagnosis    Procedure: ROBOTIC COLECTOMY SIGMOID, N/A - Abdomen    Surgeon: Surgeons and Role:     * Jason Fajardo MD - Primary     * Noemi Boland PA-C - Assisting  Anesthesia: General with Block   Estimated Blood Loss: 25 mL    Drains: None  Specimens:   ID Type Source Tests Collected by Time Destination   1 : Sigmoid Colon - for open and return Tissue Large Intestine, Colon, Sigmoid SURGICAL PATHOLOGY EXAM Jason Fajardo MD 6/30/2025 10:58 AM    2 : Anastomosis Rings Tissue Large Intestine, Colon SURGICAL PATHOLOGY EXAM Jason Fajardo MD 6/30/2025 11:18 AM      Findings:   Adhesions of the sigmoid and descending colon noted up to the splenic flexure. Tattoo in the sigmoid colon with palpable mass. Intact anastomotic rings and negative leak test.   Complications: None.  Implants: * No implants in log *    NOEMI BOLAND PA-C      ADDENDUM:    PATIENT DATA  Indicate Y or N:  Home O2 No  Hemodialysis No  Transplant patient No  Cirrhosis No  Steroids in last 30 days No  Immunomodulators in last 30 days No  Anticoagulation at time of surgery No   List medication   Prior abdominal surgery No  Pelvic irradiation No    Albumin within 30 days if known   Hgb within 30 days if known 13.4  Cr within 30 days if known 0.82  Body mass index is 30.33 kg/m .    OR DATA  Emergent No   <24 hours YES. No   <1 week No  Bowel Prep (Y or N) Yes  Antibiotics (Y or N) Yes  DVT prophylaxis    Heparin Yes   SCD Yes   None No  Drain No  ASA (1,2,3,4,5 if unknown) 2  OR time (min) 228  Stents No  Transfuse >/= 2U No  Anastomosis   Stapled Yes   Handsewn No  Leak Test (pos, neg, not done) Negative    FOR CANCER ALSO COMPLETE:  Preoperative treatment (Y or N)   Chemo No   Radiation No   Diversion No  Preoperative Stage   U/S No   MRI No   Clinical Yes   Unknown Yes   T  stage (1,2,3,4,5 if unknown) 5   N stage (0,1,2,3 if unknown) 3   M stage (0,1) 0  CEA 4.3  Metastatic disease at time of operation (Y or N) No

## 2025-06-30 NOTE — PHARMACY-ADMISSION MEDICATION HISTORY
Pharmacist Admission Medication History    Admission medication history is complete. The information provided in this note is only as accurate as the sources available at the time of the update.    Information Source(s): Patient, Clinic records, and CareEverywhere/SureScripts via in-person    Pertinent Information: none    Changes made to PTA medication list:  Added: None  Deleted: ferrous sulfate  Changed: None    Allergies reviewed with patient and updates made in EHR: yes    Medication History Completed By: Marco Nelson [facility] 6/30/2025 7:02 AM    PTA Med List   Medication Sig Last Dose/Taking    allopurinol (ZYLOPRIM) 100 MG tablet Take 2 tablets (200 mg) by mouth daily. 6/29/2025    colchicine (COLCRYS) 0.6 MG tablet Take 0.6 mg by mouth daily. 6/29/2025    COSENTYX SENSOREADY  MG/ML Sensoready pen 150 mg once every six weeks. 6/3/2025    fish oil-omega-3 fatty acids 1000 MG capsule Take 1 g by mouth daily. Past Month    Multiple Vitamin (MULTI VITAMIN) TABS Take 1 tablet by mouth daily Past Month    omeprazole (PRILOSEC) 40 MG capsule Take 40 mg by mouth daily. 6/29/2025 Morning    predniSONE (DELTASONE) 5 MG tablet Take as directed. (Patient taking differently: Take by mouth as needed. Take as directed.  30mg x 3 days, 20 mg x 3 days, 10 mg x 3) Past Month    Turmeric 400 MG CAPS Take 1 capsule by mouth daily. Past Month    vitamin C 250 MG tablet Take 500 mg by mouth daily. Past Month    vitamin D3 (CHOLECALCIFEROL) 50 mcg (2000 units) tablet Take 1 tablet by mouth daily. Past Month

## 2025-06-30 NOTE — ANESTHESIA PREPROCEDURE EVALUATION
Anesthesia Pre-Procedure Evaluation    Patient: Rigoberto Laird   MRN: [id number] : 1970          Procedure : Procedure(s):  ROBOTIC COLECTOMY SIGMOID         Past Medical History:   Diagnosis Date    Anemia     Arthritis     Colonic mass     Diabetes (H)     Gastroesophageal reflux disease with esophagitis     Gout     Personal history of malignant neoplasm of bladder     Psoriasis with arthropathy (H)     Rectal bleeding       Past Surgical History:   Procedure Laterality Date    COLONOSCOPY N/A 6/3/2025    Procedure: Colonoscopy, Screening, High Risk;  Surgeon: Ezequiel Younger MD;  Location:  GI    COLONOSCOPY N/A 6/3/2025    Procedure: COLONOSCOPY, WITH POLYPECTOMY AND BIOPSY;  Surgeon: Ezequiel Younger MD;  Location:  GI      Allergies   Allergen Reactions    Tamiflu [Oseltamivir] Rash      Social History     Tobacco Use    Smoking status: Some Days     Current packs/day: 0.10     Average packs/day: 0.1 packs/day for 21.5 years (2.1 ttl pk-yrs)     Types: Cigarettes     Start date: 2004     Passive exposure: Never    Smokeless tobacco: Never   Substance Use Topics    Alcohol use: Yes     Comment: 3 times a week      Wt Readings from Last 1 Encounters:   25 95.9 kg (211 lb 6.4 oz)        Anesthesia Evaluation            ROS/MED HX  ENT/Pulmonary:  - neg pulmonary ROS     Neurologic:  - neg neurologic ROS     Cardiovascular:  - neg cardiovascular ROS     METS/Exercise Tolerance:     Hematologic:  - neg hematologic  ROS     Musculoskeletal:       GI/Hepatic:  - neg GI/hepatic ROS     Renal/Genitourinary:       Endo:     (+) type I DM,                     Psychiatric/Substance Use:  - neg psychiatric ROS     Infectious Disease:       Malignancy:       Other:  - neg other ROS            Physical Exam  Airway  Mallampati: II  TM distance: >3 FB    Cardiovascular - normal exam   Dental   (+) Minor Abnormalities - some fillings, tiny chips      Pulmonary - normal exam      Neurological   Other  "Findings Chipped Incisors      OUTSIDE LABS:  CBC:   Lab Results   Component Value Date    WBC 7.8 05/22/2025    WBC 8.3 02/21/2025    HGB 13.4 06/30/2025    HGB 12.5 (L) 05/22/2025    HCT 39.7 (L) 05/22/2025    HCT 41.0 02/21/2025     05/22/2025     02/21/2025     BMP:   Lab Results   Component Value Date     06/03/2025     02/21/2025    POTASSIUM 3.6 06/03/2025    POTASSIUM 4.0 02/21/2025    CHLORIDE 102 06/03/2025    CHLORIDE 105 02/21/2025    CO2 25 06/03/2025    CO2 23 02/21/2025    BUN 10.7 06/03/2025    BUN 14.4 02/21/2025    CR 0.82 06/03/2025    CR 0.90 02/21/2025     (H) 06/30/2025     (H) 06/03/2025     COAGS: No results found for: \"PTT\", \"INR\", \"FIBR\"  POC: No results found for: \"BGM\", \"HCG\", \"HCGS\"  HEPATIC:   Lab Results   Component Value Date    ALBUMIN 4.3 06/03/2025    PROTTOTAL 6.7 02/21/2025    ALT 98 (H) 02/21/2025    AST 68 (H) 02/21/2025    ALKPHOS 92 02/21/2025    BILITOTAL 0.2 02/21/2025     OTHER:   Lab Results   Component Value Date    LACT 1.3 05/14/2023    A1C 5.9 (H) 05/22/2025    KARSTEN 9.2 06/03/2025    PHOS 2.9 06/03/2025    LIPASE 38 11/02/2020    TSH 2.04 08/16/2019    CRP 0.1 09/09/2021    SED 18 05/14/2023       Anesthesia Plan    ASA Status:  2       Anesthesia Type: General.  Airway: oral.  Induction: intravenous.  Maintenance: Inhalation.        Consents            Postoperative Care         Comments:                   Ladi Callahan MD    I have reviewed the pertinent notes and labs in the chart from the past 30 days and (re)examined the patient.  Any updates or changes from those notes are reflected in this note.    Clinically Significant Risk Factors Present on Admission                             # Obesity: Estimated body mass index is 30.33 kg/m  as calculated from the following:    Height as of this encounter: 1.778 m (5' 10\").    Weight as of this encounter: 95.9 kg (211 lb 6.4 oz).                    "

## 2025-07-01 LAB
ANION GAP SERPL CALCULATED.3IONS-SCNC: 9 MMOL/L (ref 7–15)
BUN SERPL-MCNC: 9.6 MG/DL (ref 6–20)
CALCIUM SERPL-MCNC: 8.9 MG/DL (ref 8.8–10.4)
CHLORIDE SERPL-SCNC: 104 MMOL/L (ref 98–107)
CREAT SERPL-MCNC: 0.74 MG/DL (ref 0.67–1.17)
EGFRCR SERPLBLD CKD-EPI 2021: >90 ML/MIN/1.73M2
ERYTHROCYTE [DISTWIDTH] IN BLOOD BY AUTOMATED COUNT: 18.4 % (ref 10–15)
GLUCOSE BLDC GLUCOMTR-MCNC: 113 MG/DL (ref 70–99)
GLUCOSE BLDC GLUCOMTR-MCNC: 123 MG/DL (ref 70–99)
GLUCOSE BLDC GLUCOMTR-MCNC: 126 MG/DL (ref 70–99)
GLUCOSE BLDC GLUCOMTR-MCNC: 134 MG/DL (ref 70–99)
GLUCOSE BLDC GLUCOMTR-MCNC: 136 MG/DL (ref 70–99)
GLUCOSE SERPL-MCNC: 129 MG/DL (ref 70–99)
HCO3 SERPL-SCNC: 27 MMOL/L (ref 22–29)
HCT VFR BLD AUTO: 41.2 % (ref 40–53)
HGB BLD-MCNC: 12.4 G/DL (ref 13.3–17.7)
MAGNESIUM SERPL-MCNC: 1.6 MG/DL (ref 1.7–2.3)
MCH RBC QN AUTO: 22.2 PG (ref 26.5–33)
MCHC RBC AUTO-ENTMCNC: 30.1 G/DL (ref 31.5–36.5)
MCV RBC AUTO: 74 FL (ref 78–100)
PLATELET # BLD AUTO: 238 10E3/UL (ref 150–450)
POTASSIUM SERPL-SCNC: 3.7 MMOL/L (ref 3.4–5.3)
RBC # BLD AUTO: 5.59 10E6/UL (ref 4.4–5.9)
SODIUM SERPL-SCNC: 140 MMOL/L (ref 135–145)
WBC # BLD AUTO: 11.1 10E3/UL (ref 4–11)

## 2025-07-01 PROCEDURE — 250N000013 HC RX MED GY IP 250 OP 250 PS 637: Performed by: COLON & RECTAL SURGERY

## 2025-07-01 PROCEDURE — 120N000001 HC R&B MED SURG/OB

## 2025-07-01 PROCEDURE — 83735 ASSAY OF MAGNESIUM: CPT | Performed by: STUDENT IN AN ORGANIZED HEALTH CARE EDUCATION/TRAINING PROGRAM

## 2025-07-01 PROCEDURE — 85027 COMPLETE CBC AUTOMATED: CPT | Performed by: STUDENT IN AN ORGANIZED HEALTH CARE EDUCATION/TRAINING PROGRAM

## 2025-07-01 PROCEDURE — 258N000003 HC RX IP 258 OP 636: Performed by: STUDENT IN AN ORGANIZED HEALTH CARE EDUCATION/TRAINING PROGRAM

## 2025-07-01 PROCEDURE — 250N000011 HC RX IP 250 OP 636: Performed by: STUDENT IN AN ORGANIZED HEALTH CARE EDUCATION/TRAINING PROGRAM

## 2025-07-01 PROCEDURE — 250N000013 HC RX MED GY IP 250 OP 250 PS 637: Performed by: STUDENT IN AN ORGANIZED HEALTH CARE EDUCATION/TRAINING PROGRAM

## 2025-07-01 PROCEDURE — 36415 COLL VENOUS BLD VENIPUNCTURE: CPT | Performed by: STUDENT IN AN ORGANIZED HEALTH CARE EDUCATION/TRAINING PROGRAM

## 2025-07-01 PROCEDURE — 80048 BASIC METABOLIC PNL TOTAL CA: CPT | Performed by: STUDENT IN AN ORGANIZED HEALTH CARE EDUCATION/TRAINING PROGRAM

## 2025-07-01 RX ORDER — HYDRALAZINE HYDROCHLORIDE 20 MG/ML
10 INJECTION INTRAMUSCULAR; INTRAVENOUS EVERY 6 HOURS PRN
Status: DISCONTINUED | OUTPATIENT
Start: 2025-07-01 | End: 2025-07-03 | Stop reason: HOSPADM

## 2025-07-01 RX ADMIN — ACETAMINOPHEN 1000 MG: 500 TABLET ORAL at 21:19

## 2025-07-01 RX ADMIN — ACETAMINOPHEN 1000 MG: 500 TABLET ORAL at 14:32

## 2025-07-01 RX ADMIN — SODIUM CHLORIDE, SODIUM LACTATE, POTASSIUM CHLORIDE, AND CALCIUM CHLORIDE: .6; .31; .03; .02 INJECTION, SOLUTION INTRAVENOUS at 07:58

## 2025-07-01 RX ADMIN — GABAPENTIN 100 MG: 100 CAPSULE ORAL at 09:05

## 2025-07-01 RX ADMIN — HYDROMORPHONE HYDROCHLORIDE 0.2 MG: 0.2 INJECTION, SOLUTION INTRAMUSCULAR; INTRAVENOUS; SUBCUTANEOUS at 03:48

## 2025-07-01 RX ADMIN — PANTOPRAZOLE SODIUM 40 MG: 40 TABLET, DELAYED RELEASE ORAL at 17:51

## 2025-07-01 RX ADMIN — ENOXAPARIN SODIUM 40 MG: 40 INJECTION SUBCUTANEOUS at 11:21

## 2025-07-01 RX ADMIN — HYDROMORPHONE HYDROCHLORIDE 0.4 MG: 0.2 INJECTION, SOLUTION INTRAMUSCULAR; INTRAVENOUS; SUBCUTANEOUS at 07:58

## 2025-07-01 RX ADMIN — ACETAMINOPHEN 1000 MG: 500 TABLET ORAL at 09:04

## 2025-07-01 RX ADMIN — HYDROMORPHONE HYDROCHLORIDE 0.2 MG: 0.2 INJECTION, SOLUTION INTRAMUSCULAR; INTRAVENOUS; SUBCUTANEOUS at 14:33

## 2025-07-01 RX ADMIN — GABAPENTIN 100 MG: 100 CAPSULE ORAL at 14:32

## 2025-07-01 RX ADMIN — GABAPENTIN 100 MG: 100 CAPSULE ORAL at 21:19

## 2025-07-01 RX ADMIN — HYDROMORPHONE HYDROCHLORIDE 0.4 MG: 0.2 INJECTION, SOLUTION INTRAMUSCULAR; INTRAVENOUS; SUBCUTANEOUS at 20:08

## 2025-07-01 RX ADMIN — PANTOPRAZOLE SODIUM 40 MG: 40 TABLET, DELAYED RELEASE ORAL at 06:33

## 2025-07-01 RX ADMIN — ACETAMINOPHEN 1000 MG: 500 TABLET ORAL at 03:22

## 2025-07-01 ASSESSMENT — ACTIVITIES OF DAILY LIVING (ADL)
ADLS_ACUITY_SCORE: 23
ADLS_ACUITY_SCORE: 23
ADLS_ACUITY_SCORE: 27
ADLS_ACUITY_SCORE: 27
ADLS_ACUITY_SCORE: 22
ADLS_ACUITY_SCORE: 27
ADLS_ACUITY_SCORE: 27
ADLS_ACUITY_SCORE: 23
ADLS_ACUITY_SCORE: 27
ADLS_ACUITY_SCORE: 22
ADLS_ACUITY_SCORE: 27
ADLS_ACUITY_SCORE: 22
ADLS_ACUITY_SCORE: 27
ADLS_ACUITY_SCORE: 27
ADLS_ACUITY_SCORE: 23
ADLS_ACUITY_SCORE: 23
ADLS_ACUITY_SCORE: 27
ADLS_ACUITY_SCORE: 23
ADLS_ACUITY_SCORE: 23
ADLS_ACUITY_SCORE: 22
ADLS_ACUITY_SCORE: 23

## 2025-07-01 NOTE — PROGRESS NOTES
[facility]  Daily Progress Note    Subjective  Patient reports feeling OK this morning. Pain was 7/10 this morning but medications helped bring it down to 3/10. He went for a walk overnight. Tolerating clear liquids without nausea or vomiting. Not passing any gas or stool yet. AVSS but a bit hypertensive at 159/57.    Objective  Intake/Output last 24 hrs:    Intake/Output Summary (Last 24 hours) at 7/1/2025 0934  Last data filed at 7/1/2025 0758  Gross per 24 hour   Intake 2260 ml   Output 3250 ml   Net -990 ml     Temp:  [97.8  F (36.6  C)-98.9  F (37.2  C)] 98.4  F (36.9  C)  Pulse:  [] 71  Resp:  [14-24] 24  BP: (144-180)/(57-97) 159/57  SpO2:  [95 %-100 %] 97 %    Physical Exam:  General: awake, alert, sitting at edge of bed, in no acute distress  Respiratory: non-labored breathing, clear to auscultation bilaterally  Cardiac: regular rate and rhythm, no murmur  Abdomen: soft, appropriately tender, non-distended              Incisions: clean, dry and intact  : peralta catheter in place draining clear yellow urine  Psychological: alert and oriented, answers questions appropriately    Pertinent Labs  Lab Results: personally reviewed.  Recent Labs   Lab Test 07/01/25  0546 06/03/25  1435 02/21/25  1706   CR 0.74 0.82 0.90     Lab Results   Component Value Date    WBC 11.1 07/01/2025    WBC 7.8 05/22/2025    WBC 8.3 02/21/2025    HGB 12.4 07/01/2025    HGB 13.4 06/30/2025    HGB 12.5 05/22/2025    HCT 41.2 07/01/2025    HCT 39.7 05/22/2025    HCT 41.0 02/21/2025    MCV 74 07/01/2025    MCV 74 06/30/2025    MCV 72 05/22/2025     07/01/2025     05/22/2025     02/21/2025       Assessment/Plan: This is a 55 year old male POD #1 s/p robotic sigmoid colectomy for colon cancer    Hgb 12.4 from 13.4  WBC 11.1  Cr 0.74    - Continue clears until having some bowel function  - May remove peralta later today vs tomorrow  - Lovenox DVT ppx  - Sliding scale insulin  - Encourage IS  -  OOB/Ambulate  - Monitor I&O    Will discuss with Dr. Fajardo and addend with any updates    Georgai Boland PA-C  [facility]  [phone]..............................main    Colorectal Surgery Staff:  I have seen and examined the patient. I agree with the above documentation and plan of the fellow/PA above with the following additions/chages:    Just had a BM. Has been on 3 walks today. Pain control adequate.     Vitals reviewed, a little hypertensive, good urine output.     Abdomen soft, slightly distended, incisions look good.     Labs reviewed.    Plan as above. Romo out now and stop fluids. We will plan to start full liquids in a.m.     Jason Fajardo MD MBA  [facility]  Office: [phone]  7/1/2025 7:29 PM

## 2025-07-01 NOTE — PLAN OF CARE
Problem: Adult Inpatient Plan of Care  Goal: Optimal Comfort and Wellbeing  7/1/2025 0627 by Andrey Hernandez RN  Outcome: Progressing  7/1/2025 0627 by Andrey Hernandez RN  Outcome: Progressing     Problem: Comorbidity Management  Goal: Blood Pressure in Desired Range  Outcome: Progressing  Intervention: Maintain Blood Pressure Management  Recent Flowsheet Documentation  Taken 7/1/2025 0020 by Andrey Hernandez, RN  Medication Review/Management: medications reviewed  Taken 6/30/2025 2105 by Andrey Hernandez RN  Medication Review/Management: medications reviewed     Problem: Pain Acute  Goal: Optimal Pain Control and Function  7/1/2025 0627 by Andrey Hernandez RN  Outcome: Progressing  7/1/2025 0627 by Andrey Hernandez RN  Outcome: Progressing  Intervention: Prevent or Manage Pain  Recent Flowsheet Documentation  Taken 7/1/2025 0020 by Andrey Hernandez RN  Sensory Stimulation Regulation: lighting decreased  Medication Review/Management: medications reviewed  Taken 6/30/2025 2105 by Andrey Hernandez RN  Sensory Stimulation Regulation: lighting decreased  Medication Review/Management: medications reviewed     Problem: Surgery Nonspecified  Goal: Blood Glucose Level Within Targeted Range  Outcome: Progressing  Goal: Optimal Pain Control and Function  Outcome: Progressing  Goal: Effective Urinary Elimination  Outcome: Progressing  Intervention: Monitor and Manage Urinary Retention  Recent Flowsheet Documentation  Taken 7/1/2025 0020 by Andrey Hernandez RN  Urinary Elimination Promotion: catheter patency maintained  Taken 6/30/2025 2105 by Andrey Hernandez, RN  Urinary Elimination Promotion: catheter patency maintained     Goal Outcome Evaluation:    A/Ox4. VSS on RA.    Reporting moderate abdominal pain, given scheduled tylenol and 0.2 mg IV dilaudid x3 with relief.     Incisions C/D/I. No BM and not passing flatus. Having hiccups.     IS in use, tolerating well. Tolerating clear liquids well.      Ambulated in fraire x1. Resting between cares.      Andrey Hernandez RN

## 2025-07-01 NOTE — PLAN OF CARE
Problem: Adult Inpatient Plan of Care  Goal: Optimal Comfort and Wellbeing  Outcome: Progressing  Intervention: Monitor Pain and Promote Comfort  Recent Flowsheet Documentation  Taken 7/1/2025 1241 by Radha Kenny RN  Pain Management Interventions: cold applied  Taken 7/1/2025 0758 by Radha Kenny, RN  Pain Management Interventions:   medication (see MAR)   cold applied     Problem: Adult Inpatient Plan of Care  Goal: Optimal Comfort and Wellbeing  Intervention: Monitor Pain and Promote Comfort  Recent Flowsheet Documentation  Taken 7/1/2025 1241 by Radha Kenny, RN  Pain Management Interventions: cold applied  Taken 7/1/2025 0758 by Radha Kenny RN  Pain Management Interventions:   medication (see MAR)   cold applied   Goal Outcome Evaluation:  Patient tolerating diet. No flatus yet. Dilaudid  IV x 2 today. Up independently for fraire walks. 2200 IS. Incisions CDI. Patient has been applying ice packs and has belly binder. Spouse here for visit.

## 2025-07-01 NOTE — PLAN OF CARE
"Shift from 1500 to 1930-      Problem: Pain Acute  Goal: Optimal Pain Control and Function  Outcome: Progressing  Intervention: Develop Pain Management Plan  Recent Flowsheet Documentation  Taken 6/30/2025 1949 by Susanne Keene RN  Pain Management Interventions:   medication (see MAR)   emotional support  Taken 6/30/2025 1630 by Susanne Keene RN  Pain Management Interventions: emotional support  Taken 6/30/2025 1605 by Susanne Keene RN  Pain Management Interventions:   medication (see MAR)   emotional support  Intervention: Prevent or Manage Pain  Recent Flowsheet Documentation  Taken 6/30/2025 1602 by Susanne Keene RN  Sensory Stimulation Regulation: lighting decreased  Medication Review/Management: medications reviewed     Problem: Surgery Nonspecified  Goal: Nausea and Vomiting Relief  Outcome: Progressing  Intervention: Prevent or Manage Nausea and Vomiting  Recent Flowsheet Documentation  Taken 6/30/2025 1602 by Susanne Keene RN  Nausea/Vomiting Interventions: antiemetic     Goal Outcome Evaluation:  Pt at beginning of shift having bilateral flank pain, burning at penis and worsening lower abd pain. Updated surgery. Pt nauseated and given zofran-nausea relieved. Given IV dilaudid twice and pt stated \"he felt much better.\"   Taking sips of clears. No flatus. IVF infusing. Pt is going to walk after visitors leave.                           "

## 2025-07-02 LAB
ANION GAP SERPL CALCULATED.3IONS-SCNC: 13 MMOL/L (ref 7–15)
BUN SERPL-MCNC: 9.6 MG/DL (ref 6–20)
CALCIUM SERPL-MCNC: 8.8 MG/DL (ref 8.8–10.4)
CHLORIDE SERPL-SCNC: 102 MMOL/L (ref 98–107)
CREAT SERPL-MCNC: 0.78 MG/DL (ref 0.67–1.17)
EGFRCR SERPLBLD CKD-EPI 2021: >90 ML/MIN/1.73M2
ERYTHROCYTE [DISTWIDTH] IN BLOOD BY AUTOMATED COUNT: 18.8 % (ref 10–15)
GLUCOSE BLDC GLUCOMTR-MCNC: 102 MG/DL (ref 70–99)
GLUCOSE BLDC GLUCOMTR-MCNC: 107 MG/DL (ref 70–99)
GLUCOSE BLDC GLUCOMTR-MCNC: 110 MG/DL (ref 70–99)
GLUCOSE BLDC GLUCOMTR-MCNC: 120 MG/DL (ref 70–99)
GLUCOSE BLDC GLUCOMTR-MCNC: 99 MG/DL (ref 70–99)
GLUCOSE SERPL-MCNC: 106 MG/DL (ref 70–99)
HCO3 SERPL-SCNC: 25 MMOL/L (ref 22–29)
HCT VFR BLD AUTO: 42.5 % (ref 40–53)
HGB BLD-MCNC: 12.6 G/DL (ref 13.3–17.7)
MAGNESIUM SERPL-MCNC: 1.8 MG/DL (ref 1.7–2.3)
MCH RBC QN AUTO: 22.3 PG (ref 26.5–33)
MCHC RBC AUTO-ENTMCNC: 29.6 G/DL (ref 31.5–36.5)
MCV RBC AUTO: 75 FL (ref 78–100)
PATH REPORT.COMMENTS IMP SPEC: ABNORMAL
PATH REPORT.COMMENTS IMP SPEC: ABNORMAL
PATH REPORT.COMMENTS IMP SPEC: YES
PATH REPORT.FINAL DX SPEC: ABNORMAL
PATH REPORT.GROSS SPEC: ABNORMAL
PATH REPORT.MICROSCOPIC SPEC OTHER STN: ABNORMAL
PATH REPORT.RELEVANT HX SPEC: ABNORMAL
PATHOLOGY SYNOPTIC REPORT: ABNORMAL
PHOTO IMAGE: ABNORMAL
PLATELET # BLD AUTO: 230 10E3/UL (ref 150–450)
POTASSIUM SERPL-SCNC: 3.8 MMOL/L (ref 3.4–5.3)
RBC # BLD AUTO: 5.65 10E6/UL (ref 4.4–5.9)
SODIUM SERPL-SCNC: 140 MMOL/L (ref 135–145)
WBC # BLD AUTO: 9.7 10E3/UL (ref 4–11)

## 2025-07-02 PROCEDURE — 120N000001 HC R&B MED SURG/OB

## 2025-07-02 PROCEDURE — 83735 ASSAY OF MAGNESIUM: CPT | Performed by: COLON & RECTAL SURGERY

## 2025-07-02 PROCEDURE — 85018 HEMOGLOBIN: CPT | Performed by: STUDENT IN AN ORGANIZED HEALTH CARE EDUCATION/TRAINING PROGRAM

## 2025-07-02 PROCEDURE — 250N000013 HC RX MED GY IP 250 OP 250 PS 637: Performed by: COLON & RECTAL SURGERY

## 2025-07-02 PROCEDURE — 85014 HEMATOCRIT: CPT | Performed by: STUDENT IN AN ORGANIZED HEALTH CARE EDUCATION/TRAINING PROGRAM

## 2025-07-02 PROCEDURE — 80048 BASIC METABOLIC PNL TOTAL CA: CPT | Performed by: STUDENT IN AN ORGANIZED HEALTH CARE EDUCATION/TRAINING PROGRAM

## 2025-07-02 PROCEDURE — 250N000011 HC RX IP 250 OP 636: Performed by: STUDENT IN AN ORGANIZED HEALTH CARE EDUCATION/TRAINING PROGRAM

## 2025-07-02 PROCEDURE — 250N000013 HC RX MED GY IP 250 OP 250 PS 637: Performed by: STUDENT IN AN ORGANIZED HEALTH CARE EDUCATION/TRAINING PROGRAM

## 2025-07-02 PROCEDURE — 36415 COLL VENOUS BLD VENIPUNCTURE: CPT | Performed by: STUDENT IN AN ORGANIZED HEALTH CARE EDUCATION/TRAINING PROGRAM

## 2025-07-02 RX ADMIN — ACETAMINOPHEN 1000 MG: 500 TABLET ORAL at 03:42

## 2025-07-02 RX ADMIN — ACETAMINOPHEN 1000 MG: 500 TABLET ORAL at 08:13

## 2025-07-02 RX ADMIN — PANTOPRAZOLE SODIUM 40 MG: 40 TABLET, DELAYED RELEASE ORAL at 06:51

## 2025-07-02 RX ADMIN — GABAPENTIN 100 MG: 100 CAPSULE ORAL at 08:14

## 2025-07-02 RX ADMIN — GABAPENTIN 100 MG: 100 CAPSULE ORAL at 14:23

## 2025-07-02 RX ADMIN — SIMETHICONE 80 MG: 80 TABLET, CHEWABLE ORAL at 08:14

## 2025-07-02 RX ADMIN — ACETAMINOPHEN 1000 MG: 500 TABLET ORAL at 14:23

## 2025-07-02 RX ADMIN — PANTOPRAZOLE SODIUM 40 MG: 40 TABLET, DELAYED RELEASE ORAL at 17:43

## 2025-07-02 RX ADMIN — GABAPENTIN 100 MG: 100 CAPSULE ORAL at 20:08

## 2025-07-02 RX ADMIN — ENOXAPARIN SODIUM 40 MG: 40 INJECTION SUBCUTANEOUS at 11:45

## 2025-07-02 RX ADMIN — ACETAMINOPHEN 1000 MG: 500 TABLET ORAL at 20:07

## 2025-07-02 ASSESSMENT — ACTIVITIES OF DAILY LIVING (ADL)
ADLS_ACUITY_SCORE: 29
ADLS_ACUITY_SCORE: 35
ADLS_ACUITY_SCORE: 29
ADLS_ACUITY_SCORE: 29
ADLS_ACUITY_SCORE: 35
ADLS_ACUITY_SCORE: 35
ADLS_ACUITY_SCORE: 29
ADLS_ACUITY_SCORE: 35
ADLS_ACUITY_SCORE: 27
ADLS_ACUITY_SCORE: 35
ADLS_ACUITY_SCORE: 27
ADLS_ACUITY_SCORE: 29
ADLS_ACUITY_SCORE: 29
ADLS_ACUITY_SCORE: 35
ADLS_ACUITY_SCORE: 29
ADLS_ACUITY_SCORE: 35
ADLS_ACUITY_SCORE: 29

## 2025-07-02 NOTE — PROGRESS NOTES
Colon and Rectal Surgery  Daily Progress Note    Subjective  Feeling well. Pain well controlled, has minimally been needing medication. Tolerating clears, trying full liquids for breakfast. Denies any nausea, having some mild gas pain. Continues to pass stool, had a formed BM this AM. Urinating well. Slightly hypertensive, 151/80, otherwise vitals stable.     Objective  Intake/Output last 24 hrs:    Intake/Output Summary (Last 24 hours) at 7/1/2025 0934  Last data filed at 7/1/2025 0758  Gross per 24 hour   Intake 2260 ml   Output 3250 ml   Net -990 ml     Temp:  [97.9  F (36.6  C)-98.7  F (37.1  C)] 98.7  F (37.1  C)  Pulse:  [] 75  Resp:  [20-24] 20  BP: (142-163)/(57-90) 151/80  SpO2:  [93 %-97 %] 95 %    Physical Exam:  General: awake, alert, sitting at edge of bed, in no acute distress  Respiratory: non-labored breathing, clear to auscultation bilaterally  Cardiac: regular rate and rhythm, no murmur  Abdomen: soft, appropriately tender, non-distended              Incisions: clean, dry and intact  Psychological: alert and oriented, answers questions appropriately    Pertinent Labs  Lab Results: personally reviewed.  Recent Labs   Lab Test 07/02/25  0623 07/01/25  0546 06/03/25  1435   CR 0.78 0.74 0.82     Lab Results   Component Value Date    WBC 11.1 07/01/2025    WBC 7.8 05/22/2025    WBC 8.3 02/21/2025    HGB 12.4 07/01/2025    HGB 13.4 06/30/2025    HGB 12.5 05/22/2025    HCT 41.2 07/01/2025    HCT 39.7 05/22/2025    HCT 41.0 02/21/2025    MCV 74 07/01/2025    MCV 74 06/30/2025    MCV 72 05/22/2025     07/01/2025     05/22/2025     02/21/2025       Assessment/Plan: This is a 55 year old male POD #2 s/p robotic sigmoid colectomy for colon cancer    Hgb 12.6  WBC 9.7  Cr 0.78    - Full liquids today, possibly advance to LFD later if doing well  - Lovenox DVT ppx  - Sliding scale insulin  - Encourage IS  - OOB/Ambulate  - Monitor I&O    Will discuss with Dr. Soriano/Dr. Fajardo and  addend with any updates    Bryce Tijerina PA-C  [facility]  [phone]..............................main    Colorectal Surgery Staff:  I have seen and examined the patient. I agree with the above documentation and plan of the fellow/PA above with the following additions/chages:    Looks good, abdominal exam much better, passing gas and stool.  Pain controlled.  Abdomen soft and much less distended.  Labs reviewed.  We will plan to send him home on a low fiber diet tomorrow.  He will go home on extended VTE chemoprophylaxis.  I told him specifically no gout medications including allopurinol, colchicine, or prednisone without expressively talking to me or one of my partners first.  He also understands that per my partners will be covering me over the holiday weekend.    Jason Fajardo MD MBA  [facility]  Office: [phone]  7/2/2025 1:01 PM

## 2025-07-02 NOTE — PLAN OF CARE
Problem: Pain Acute  Goal: Optimal Pain Control and Function  Outcome: Progressing     Problem: Surgery Nonspecified  Goal: Effective Urinary Elimination  Outcome: Progressing   Goal Outcome Evaluation:       Good oral intake. Discontinue IVF per Dr. Fajardo. Romo removed at 1945 per order. Void pending. Ambulated independently twice this shift. Flatus noted and small amt of liquid stool.

## 2025-07-02 NOTE — PLAN OF CARE
Problem: Pain Acute  Goal: Optimal Pain Control and Function  Outcome: Progressing  Intervention: Prevent or Manage Pain  Recent Flowsheet Documentation  Taken 7/2/2025 0045 by Andrey Hernandez RN  Medication Review/Management: medications reviewed     Problem: Surgery Nonspecified  Goal: Effective Bowel Elimination  Outcome: Progressing  Goal: Optimal Pain Control and Function  Outcome: Progressing  Goal: Effective Urinary Elimination  Outcome: Progressing   Goal Outcome Evaluation:    A/Ox4. VSS.    Reported mild to moderate abdominal pain, only utilizing ice and scheduled pain meds for pain control at pt request.     Pt has had several loose brown Bms and is passing gas. Romo catheter removed yesterday afternoon, voiding well. Still endorses intermittent bloating sensation.     Tolerating clear liquids well and will attempt full liquids with breakfast this morning.     Ambulating independently in room; pt states he has taken multiple short walks around room.        Andrey Hernandez, RN

## 2025-07-02 NOTE — PLAN OF CARE
Problem: Adult Inpatient Plan of Care  Goal: Plan of Care Review  Description: The Plan of Care Review/Shift note should be completed every shift.  The Outcome Evaluation is a brief statement about your assessment that the patient is improving, declining, or no change.  This information will be displayed automatically on your shift  note.  Outcome: Progressing  Flowsheets (Taken 7/2/2025 9731)  Plan of Care Reviewed With: patient   Goal Outcome Evaluation:      Plan of Care Reviewed With: patient        Patient reporting abdominal surgical pain mild 3/10.  Bowel function slowly improving.  Patient passing flatus and stool.  Bowel sounds positive.  Some bloating reported.  Simethicone given and patient encouraged to walk frequently to help motility.      Patient tolerating full liquids well.  Lovenox education completed and patient does injections at home so if patient needs to do lovenox at home he is okay from this standpoint.  Family supportive at bedside.

## 2025-07-03 VITALS
TEMPERATURE: 98.6 F | SYSTOLIC BLOOD PRESSURE: 126 MMHG | DIASTOLIC BLOOD PRESSURE: 76 MMHG | RESPIRATION RATE: 18 BRPM | HEIGHT: 70 IN | OXYGEN SATURATION: 95 % | HEART RATE: 80 BPM | BODY MASS INDEX: 30.26 KG/M2 | WEIGHT: 211.4 LBS

## 2025-07-03 LAB
CREAT SERPL-MCNC: 0.8 MG/DL (ref 0.67–1.17)
EGFRCR SERPLBLD CKD-EPI 2021: >90 ML/MIN/1.73M2
GLUCOSE BLDC GLUCOMTR-MCNC: 91 MG/DL (ref 70–99)
GLUCOSE BLDC GLUCOMTR-MCNC: 92 MG/DL (ref 70–99)
GLUCOSE BLDC GLUCOMTR-MCNC: 95 MG/DL (ref 70–99)
MAGNESIUM SERPL-MCNC: 1.7 MG/DL (ref 1.7–2.3)
MCV RBC AUTO: 75 FL (ref 78–100)
PLATELET # BLD AUTO: 202 10E3/UL (ref 150–450)
POTASSIUM SERPL-SCNC: 3.7 MMOL/L (ref 3.4–5.3)

## 2025-07-03 PROCEDURE — 250N000013 HC RX MED GY IP 250 OP 250 PS 637: Performed by: COLON & RECTAL SURGERY

## 2025-07-03 PROCEDURE — 83735 ASSAY OF MAGNESIUM: CPT | Performed by: COLON & RECTAL SURGERY

## 2025-07-03 PROCEDURE — 84132 ASSAY OF SERUM POTASSIUM: CPT | Performed by: COLON & RECTAL SURGERY

## 2025-07-03 PROCEDURE — 250N000011 HC RX IP 250 OP 636: Performed by: STUDENT IN AN ORGANIZED HEALTH CARE EDUCATION/TRAINING PROGRAM

## 2025-07-03 PROCEDURE — 36415 COLL VENOUS BLD VENIPUNCTURE: CPT | Performed by: COLON & RECTAL SURGERY

## 2025-07-03 PROCEDURE — 85049 AUTOMATED PLATELET COUNT: CPT | Performed by: COLON & RECTAL SURGERY

## 2025-07-03 PROCEDURE — 82565 ASSAY OF CREATININE: CPT | Performed by: STUDENT IN AN ORGANIZED HEALTH CARE EDUCATION/TRAINING PROGRAM

## 2025-07-03 PROCEDURE — 250N000013 HC RX MED GY IP 250 OP 250 PS 637: Performed by: STUDENT IN AN ORGANIZED HEALTH CARE EDUCATION/TRAINING PROGRAM

## 2025-07-03 RX ORDER — ENOXAPARIN SODIUM 100 MG/ML
40 INJECTION SUBCUTANEOUS DAILY
Qty: 10 ML | Refills: 0 | Status: SHIPPED | OUTPATIENT
Start: 2025-07-03 | End: 2025-07-28

## 2025-07-03 RX ORDER — GABAPENTIN 100 MG/1
100 CAPSULE ORAL 3 TIMES DAILY PRN
Qty: 21 CAPSULE | Refills: 0 | Status: SHIPPED | OUTPATIENT
Start: 2025-07-03 | End: 2025-07-10

## 2025-07-03 RX ADMIN — SIMETHICONE 80 MG: 80 TABLET, CHEWABLE ORAL at 08:55

## 2025-07-03 RX ADMIN — PANTOPRAZOLE SODIUM 40 MG: 40 TABLET, DELAYED RELEASE ORAL at 06:32

## 2025-07-03 RX ADMIN — GABAPENTIN 100 MG: 100 CAPSULE ORAL at 13:54

## 2025-07-03 RX ADMIN — ACETAMINOPHEN 1000 MG: 500 TABLET ORAL at 08:52

## 2025-07-03 RX ADMIN — GABAPENTIN 100 MG: 100 CAPSULE ORAL at 08:52

## 2025-07-03 RX ADMIN — ACETAMINOPHEN 1000 MG: 500 TABLET ORAL at 02:51

## 2025-07-03 RX ADMIN — ENOXAPARIN SODIUM 40 MG: 40 INJECTION SUBCUTANEOUS at 13:53

## 2025-07-03 RX ADMIN — ACETAMINOPHEN 1000 MG: 500 TABLET ORAL at 13:53

## 2025-07-03 ASSESSMENT — ACTIVITIES OF DAILY LIVING (ADL)
ADLS_ACUITY_SCORE: 35

## 2025-07-03 NOTE — DISCHARGE SUMMARY
[facility] Discharge Summary      Rigoberto Laird MRN# [id number]   Age: 55 year old YOB: 1970     Date of Admission:  6/30/2025  Date of Discharge::  7/3/2025  Admitting Physician:  Jason Fajardo MD  Discharge Physician:  Jason Fajardo MD     PCP:  Judy Dutton    Disposition: Patient discharged from [facility] to home in stable condition.        Primary Diagnosis:   Colon cancer         Discharge Medications:     Current Discharge Medication List        START taking these medications    Details   enoxaparin ANTICOAGULANT (LOVENOX) 40 MG/0.4ML syringe Inject 0.4 mLs (40 mg) subcutaneously daily for 25 days.  Qty: 10 mL, Refills: 0    Associated Diagnoses: Malignant neoplasm of sigmoid colon (H)      gabapentin (NEURONTIN) 100 MG capsule Take 1 capsule (100 mg) by mouth 3 times daily as needed for other (Postoperative pain).  Qty: 21 capsule, Refills: 0    Associated Diagnoses: Malignant neoplasm of sigmoid colon (H)           CONTINUE these medications which have NOT CHANGED    Details   fish oil-omega-3 fatty acids 1000 MG capsule Take 1 g by mouth daily.      Multiple Vitamin (MULTI VITAMIN) TABS Take 1 tablet by mouth daily      omeprazole (PRILOSEC) 40 MG capsule Take 40 mg by mouth daily.      Turmeric 400 MG CAPS Take 1 capsule by mouth daily.      vitamin C 250 MG tablet Take 500 mg by mouth daily.      vitamin D3 (CHOLECALCIFEROL) 50 mcg (2000 units) tablet Take 1 tablet by mouth daily.      blood glucose (NO BRAND SPECIFIED) test strip Use to test blood sugar 1 times daily or as directed. To accompany: Blood Glucose Monitor Brands: per insurance.  Qty: 100 strip, Refills: 6    Associated Diagnoses: Type 2 diabetes mellitus without complication, without long-term current use of insulin (H)      blood glucose monitoring (NO BRAND SPECIFIED) meter device kit Use to test blood sugar 1 times daily or as directed. Preferred blood glucose meter OR supplies to accompany:  Blood Glucose Monitor Brands: per insurance.  Qty: 1 kit, Refills: 0    Associated Diagnoses: Type 2 diabetes mellitus without complication, without long-term current use of insulin (H)      Blood Glucose Monitoring Suppl (ACCU-CHEK GUIDE ME) w/Device KIT USE TO TEST BLOOD SUGAR 1 TIMES DAILY      thin (NO BRAND SPECIFIED) lancets Use with lanceting device. To accompany: Blood Glucose Monitor Brands: per insurance.  Qty: 100 each, Refills: 6    Associated Diagnoses: Type 2 diabetes mellitus without complication, without long-term current use of insulin (H)           STOP taking these medications       allopurinol (ZYLOPRIM) 100 MG tablet Comments:   Reason for Stopping:         colchicine (COLCRYS) 0.6 MG tablet Comments:   Reason for Stopping:         COSENTYX SENSOREADY  MG/ML Sensoready pen Comments:   Reason for Stopping:         predniSONE (DELTASONE) 5 MG tablet Comments:   Reason for Stopping:                      Follow Up, Special Instructions:     Discharge diet: Low fiber diet until you follow up in clinic   Discharge activity: No lifting anything heavier than 10 lbs for 6 weeks.   Discharge follow-up: Follow up with your primary care provider within 1 week.  Follow up with Dr. Fajardo (your surgeon) in 3-4 weeks.   Wound care: Your incisions are covered with a skin glue that will fall off on its own over 1-2 weeks. You may shower but do not submerge your incisions under water (swimming) for 4 weeks.            Procedures:     Procedure(s): Robotic sigmoid colectomy               Consultations:   None          Brief Hospital Summary:     Patient is a 55 year old patient who underwent the above procedure on 6/30/25 by Dr. Fajardo.   There were no immediate complications during this procedure.    Please refer to the full operative summary for details.  The patient's hospital course was unremarkable.  Pain was controlled on oral pain regimen.  He was tolerating a low fiber diet.  Bowel function had  returned prior to discharge.  He recovered as anticipated and experienced no post-operative complications.         Attestation:  I have reviewed today's vital signs, notes, medications, labs and imaging. I have personally seen and examined the patient. Abdomen soft, non-tender, non-distended. Incisions c/d/I. Vitals stable, tolerating diet, and having bowel movements.    Dae Che MD, MS  Fellow in Colon & Rectal Surgery  [facility]  [address] [address]  T: [phone]  F: [phone]            ADDENDUM:  Length of stay: 3 days  Indicate Y or N for the following:  UTI  No  C diff  No  PNA  No  SSI No  DVT No  PE  No  CVA No  MI No  Enterocutaneous fistula  No  Peripheral nerve injury  No  Abscess (not adjacent to anastomosis)  No  Leak No   Death within 30 days No  Reintubation  No  Reoperation  No    FOR CANCER CASES:  T stage: 3  N stage: 1b   Total number of nodes: 28   Total positive: 3  M stage: 0  R: 0  TME grade, if known (1,2,3):   MSI (pos, neg): neg (intact expression)

## 2025-07-03 NOTE — PLAN OF CARE
RN discussed discharge instructions with patient.  AVS provided.  Patient verbalized understanding and voiced no concerns at this time.  Low fiber diet discussed/ lovenox education completed.   2 prescriptions filled through our pharmacy and given to patient.  Patient discharging home with spouse.  No significant changes noted.

## 2025-07-03 NOTE — PROGRESS NOTES
SPIRITUAL HEALTH SERVICES Note     Saw pt Rigoberto Laird and administered [employer] sacrament of anointing for the healing of the sick.    Fr. Jung Scruggs

## 2025-07-03 NOTE — PLAN OF CARE
Problem: Surgery Nonspecified  Goal: Optimal Pain Control and Function  Outcome: Progressing     Problem: Surgery Nonspecified  Goal: Absence of Infection Signs and Symptoms  Outcome: Progressing     Problem: Surgery Nonspecified  Goal: Effective Bowel Elimination  Outcome: Progressing   Goal Outcome Evaluation:      Plan of Care Reviewed With: patient    Overall Patient Progress: no changeOverall Patient Progress: no change     Pt A and Ox4. Independent in room. VSS on RA. Denies pain. Surgical sites SPENCER and WDL. Blood sugar 102 at 2100 and 91 at 0200. Voiding well. No BM overnight. Mag and K being redrawn in the AM. Will continue to monitor.     Ciara Villareal RN   [phone]

## 2025-07-07 ENCOUNTER — PATIENT OUTREACH (OUTPATIENT)
Dept: CARE COORDINATION | Facility: CLINIC | Age: 55
End: 2025-07-07
Payer: COMMERCIAL

## 2025-07-07 NOTE — PROGRESS NOTES
[facility]: Transitions of Care Outreach  Chief Complaint   Patient presents with    Clinic Care Coordination - Post Hospital       Most Recent Admission Date: 6/30/2025   Most Recent Admission Diagnosis: Mass of colon - K63.89  Colon cancer (H) - C18.9     Most Recent Discharge Date: 7/3/2025   Most Recent Discharge Diagnosis: Malignant neoplasm of sigmoid colon (H) - C18.7     Transitions of Care Assessment    Discharge Assessment  How are you doing now that you are home?: I am doing pretty good. My only concern is I have not had a bowel movement in 24 hours.  How are your symptoms? (Red Flag symptoms escalate to triage hotline per guidelines): Improved  Do you know how to contact your clinic care team if you have future questions or changes to your health status? : Yes  Does the patient have their discharge instructions? : Yes  Does the patient have questions regarding their discharge instructions? : No  Were you started on any new medications or were there changes to any of your previous medications? : Yes  Does the patient have all of their medications?: Yes  Do you have questions regarding any of your medications? : No    Post-op ([facility] Only)  If the patient had a surgery or procedure, do they have any questions for a nurse?: No         [facility] Explained and offered Care Coordination support to eligible patients: Yes    Patient accepted? No    Follow up Plan     Discharge Follow-Up  Discharge follow up appointment scheduled in alignment with recommended follow up timeframe or Transitions of Risk Category? (Low = within 30 days; Moderate= within 14 days; High= within 7 days): Yes  Discharge Follow Up Appointment Date: 07/15/25  Discharge Follow Up Appointment Scheduled with?: Specialty Care Provider    Future Appointments   Date Time Provider Department Center   8/12/2025  1:45 PM Tate Miles APRN CNP [facility]   9/29/2025  2:30 PM Judy Dutton DO [facility]        Outpatient Plan as outlined on AVS reviewed with patient.    For any urgent concerns, please contact our 24 hour nurse triage line: [phone] ([phone])       GIANNI Bravo  [phone]  [facility]

## 2025-07-14 ENCOUNTER — TRANSFERRED RECORDS (OUTPATIENT)
Dept: HEALTH INFORMATION MANAGEMENT | Facility: CLINIC | Age: 55
End: 2025-07-14
Payer: COMMERCIAL

## 2025-08-26 ENCOUNTER — TRANSFERRED RECORDS (OUTPATIENT)
Dept: HEALTH INFORMATION MANAGEMENT | Facility: CLINIC | Age: 55
End: 2025-08-26
Payer: COMMERCIAL

## 2025-08-31 ENCOUNTER — HEALTH MAINTENANCE LETTER (OUTPATIENT)
Age: 55
End: 2025-08-31

## (undated) DEVICE — NEEDLE HYPO 18X1-1/2 SAFETY 305918

## (undated) DEVICE — GOWN XLG DISP 9545

## (undated) DEVICE — DRAPE POUCH INSTRUMENT 3 POCKET 1018L

## (undated) DEVICE — SPONGE LAP 18X18" X8435

## (undated) DEVICE — CATH TRAY FOLEY SURESTEP 16FR DRAIN BAG STATOCK A899916

## (undated) DEVICE — SYR 10ML LL W/O NDL 302995

## (undated) DEVICE — DAVINCI XI DRAPE ARM 470015

## (undated) DEVICE — SYR 50ML SLIP TIP W/O NDL 309654

## (undated) DEVICE — JELLY LUBRICATING SURGILUBE 2OZ TUBE

## (undated) DEVICE — SUTURE PDS 1 CTB-1 ZB347

## (undated) DEVICE — SUTURE VICRYL 0 TIES 18IN UNDYED J912G

## (undated) DEVICE — SUCTION TIP POOLE STERILE 35040

## (undated) DEVICE — DAVINCI XI REDUCER 8-12MM 470381

## (undated) DEVICE — SU PROLENE 2-0 V-7 36" 8977H

## (undated) DEVICE — LUBRICANT INST ELECTROLUBE EL101

## (undated) DEVICE — GLOVE BIOGEL INDICATOR 7.5 LF 41675

## (undated) DEVICE — BAG URINARY DRAIN 2000ML LF 154002

## (undated) DEVICE — DAVINCI XI OBTURATOR BLADELESS 8MM 470359

## (undated) DEVICE — TUBING SUCTION MEDI-VAC 1/4"X20' N620A

## (undated) DEVICE — WIPE PREP 2% CHLORHEXIDINE GLUCONATE CLOTH 7.5 X7.5IN 9717

## (undated) DEVICE — DRAPE LEGGINGS 8421

## (undated) DEVICE — SUTURE ENDO SURGITIE 21 POLYSORB EL23LN

## (undated) DEVICE — DRAPE SHEET TABLE COVER KC 42301*

## (undated) DEVICE — STPL CIRCULAR 29MM CVD CDH29P

## (undated) DEVICE — DAVINCI XI DRAPE COLUMN 470341

## (undated) DEVICE — CUSTOM PACK LAP CHOLE SBA5BLCHEA

## (undated) DEVICE — SUCTION STRYKERFLOW II 250-070-500

## (undated) DEVICE — DAVINCI HOT SHEARS TIP COVER  400180

## (undated) DEVICE — KIT SIGMOIDOSCOPE ENDOSCOPIC LIGHTED 18FR KI613/10

## (undated) DEVICE — PREP CHLORAPREP 26ML TINTED HI-LITE ORANGE 930815

## (undated) DEVICE — GLOVE UNDER INDICATOR PI SZ 7.0 LF 41670

## (undated) DEVICE — KIT POSITIONER NUBLUE XL TRND CHST PD BD STRAP TP3000E-S-NB

## (undated) DEVICE — TUBING FILTER TRI-LUMEN AIRSEAL ASC-EVAC1

## (undated) DEVICE — SU MONOCRYL+ 4-0 18IN PS2 UND MCP496G

## (undated) DEVICE — SU DERMABOND ADVANCED .7ML DNX12

## (undated) DEVICE — STPL DAVINCI SUREFORM 60MM RELOAD GREEN 48360G

## (undated) DEVICE — SOL WATER IRRIG 1000ML BOTTLE 2F7114

## (undated) DEVICE — CUSTOM PACK COLON CLOSING SBA5BCCHEA

## (undated) DEVICE — NDL INSUFFLATION 13GA 120MM C2201

## (undated) DEVICE — SUCTION MANIFOLD NEPTUNE 2 SYS 1 PORT 702-025-000

## (undated) DEVICE — Device

## (undated) DEVICE — SUCTION TIP YANKAUER W/O VENT K86

## (undated) DEVICE — ESU PENCIL SMOKE EVAC W/ROCKER SWITCH 0703-047-000

## (undated) DEVICE — CATH MALECOT 28FR LATEX 86028

## (undated) DEVICE — TROCAR PORT BLADELESS 5X100MM IAS5-100LP

## (undated) DEVICE — SU VICRYL+ 2-0 TIES 18 UND VCP111G

## (undated) DEVICE — MAT FLOOR WATERPROOF BACKSHEET FMBP30

## (undated) DEVICE — PREP POVIDONE-IODINE 10% SOLUTION 4OZ BOTTLE MDS093944

## (undated) DEVICE — SU VICRYL+ 3-0 27IN SH UND VCP416H

## (undated) DEVICE — DAVINCI XI HANDPIECE ESU VESSEL SEALER 8MM EXT 480422

## (undated) DEVICE — SYR 50ML CATH TIP W/O NDL 309620

## (undated) DEVICE — DAVINCI XI SEAL UNIVERSAL 5-12MM 470500

## (undated) DEVICE — ENDO GELPORT 100/120MM C8XX2

## (undated) DEVICE — DRAPE IOBAN INCISE 23X17" 6650EZ

## (undated) DEVICE — ANTIFOG SOLUTION SEE SHARP 150M TROCAR SWABS 30978 (COI)

## (undated) DEVICE — BLADE KNIFE SURG 11 371111

## (undated) DEVICE — STPL DAVINCI SUREFORM 60MM STR 480460

## (undated) DEVICE — PITCHER STERILE 1000ML  SSK9004A

## (undated) DEVICE — DRAPE U SPLIT 74X120" 29440

## (undated) DEVICE — PRTC STD XTRMT TRND ARM HKLP CLSR LF DISP TAP100

## (undated) RX ORDER — BUPIVACAINE HYDROCHLORIDE 2.5 MG/ML
INJECTION, SOLUTION INFILTRATION; PERINEURAL
Status: DISPENSED
Start: 2025-06-30

## (undated) RX ORDER — FENTANYL CITRATE 50 UG/ML
INJECTION, SOLUTION INTRAMUSCULAR; INTRAVENOUS
Status: DISPENSED
Start: 2025-06-30

## (undated) RX ORDER — LIDOCAINE HYDROCHLORIDE 10 MG/ML
INJECTION, SOLUTION EPIDURAL; INFILTRATION; INTRACAUDAL; PERINEURAL
Status: DISPENSED
Start: 2025-06-30

## (undated) RX ORDER — DEXAMETHASONE SODIUM PHOSPHATE 10 MG/ML
INJECTION, SOLUTION INTRAMUSCULAR; INTRAVENOUS
Status: DISPENSED
Start: 2025-06-30

## (undated) RX ORDER — CEFAZOLIN SODIUM 1 G/3ML
INJECTION, POWDER, FOR SOLUTION INTRAMUSCULAR; INTRAVENOUS
Status: DISPENSED
Start: 2025-06-30

## (undated) RX ORDER — INDOCYANINE GREEN AND WATER 25 MG
KIT INJECTION
Status: DISPENSED
Start: 2025-06-30

## (undated) RX ORDER — LABETALOL HYDROCHLORIDE 5 MG/ML
INJECTION, SOLUTION INTRAVENOUS
Status: DISPENSED
Start: 2025-06-30

## (undated) RX ORDER — PROPOFOL 10 MG/ML
INJECTION, EMULSION INTRAVENOUS
Status: DISPENSED
Start: 2025-06-30

## (undated) RX ORDER — FENTANYL CITRATE 50 UG/ML
INJECTION, SOLUTION INTRAMUSCULAR; INTRAVENOUS
Status: DISPENSED
Start: 2025-06-03

## (undated) RX ORDER — ONDANSETRON 2 MG/ML
INJECTION INTRAMUSCULAR; INTRAVENOUS
Status: DISPENSED
Start: 2025-06-30